# Patient Record
Sex: FEMALE | Race: WHITE | Employment: OTHER | URBAN - METROPOLITAN AREA
[De-identification: names, ages, dates, MRNs, and addresses within clinical notes are randomized per-mention and may not be internally consistent; named-entity substitution may affect disease eponyms.]

---

## 2018-09-16 ENCOUNTER — HOSPITAL ENCOUNTER (OUTPATIENT)
Age: 73
Setting detail: OBSERVATION
Discharge: HOME | End: 2018-09-19
Attending: EMERGENCY MEDICINE | Admitting: GENERAL PRACTICE
Payer: COMMERCIAL

## 2018-09-16 ENCOUNTER — APPOINTMENT (OUTPATIENT)
Dept: GENERAL RADIOLOGY | Age: 73
End: 2018-09-16
Payer: COMMERCIAL

## 2018-09-16 DIAGNOSIS — J18.9 COMMUNITY ACQUIRED PNEUMONIA, UNSPECIFIED LATERALITY: Primary | ICD-10-CM

## 2018-09-16 DIAGNOSIS — R07.9 CHEST PAIN, UNSPECIFIED TYPE: ICD-10-CM

## 2018-09-16 DIAGNOSIS — L03.811 CELLULITIS OF SCALP: ICD-10-CM

## 2018-09-16 LAB
ALBUMIN SERPL-MCNC: 3.6 G/DL (ref 3.5–5.2)
ALP BLD-CCNC: 79 U/L (ref 35–104)
ALT SERPL-CCNC: 14 U/L (ref 0–32)
ANION GAP SERPL CALCULATED.3IONS-SCNC: 12 MMOL/L (ref 7–16)
AST SERPL-CCNC: 25 U/L (ref 0–31)
BASOPHILS ABSOLUTE: 0.1 E9/L (ref 0–0.2)
BASOPHILS RELATIVE PERCENT: 1.1 % (ref 0–2)
BILIRUB SERPL-MCNC: 0.4 MG/DL (ref 0–1.2)
BUN BLDV-MCNC: 10 MG/DL (ref 8–23)
CALCIUM SERPL-MCNC: 8.9 MG/DL (ref 8.6–10.2)
CHLORIDE BLD-SCNC: 105 MMOL/L (ref 98–107)
CO2: 26 MMOL/L (ref 22–29)
CREAT SERPL-MCNC: 0.7 MG/DL (ref 0.5–1)
EKG ATRIAL RATE: 58 BPM
EKG P AXIS: 46 DEGREES
EKG P-R INTERVAL: 146 MS
EKG Q-T INTERVAL: 392 MS
EKG QRS DURATION: 80 MS
EKG QTC CALCULATION (BAZETT): 384 MS
EKG R AXIS: 41 DEGREES
EKG T AXIS: 69 DEGREES
EKG VENTRICULAR RATE: 58 BPM
EOSINOPHILS ABSOLUTE: 0.19 E9/L (ref 0.05–0.5)
EOSINOPHILS RELATIVE PERCENT: 2.2 % (ref 0–6)
GFR AFRICAN AMERICAN: >60
GFR NON-AFRICAN AMERICAN: >60 ML/MIN/1.73
GLUCOSE BLD-MCNC: 97 MG/DL (ref 74–109)
HCT VFR BLD CALC: 45.4 % (ref 34–48)
HEMOGLOBIN: 13.9 G/DL (ref 11.5–15.5)
IMMATURE GRANULOCYTES #: 0.03 E9/L
IMMATURE GRANULOCYTES %: 0.3 % (ref 0–5)
LYMPHOCYTES ABSOLUTE: 1.53 E9/L (ref 1.5–4)
LYMPHOCYTES RELATIVE PERCENT: 17.4 % (ref 20–42)
MCH RBC QN AUTO: 29.8 PG (ref 26–35)
MCHC RBC AUTO-ENTMCNC: 30.6 % (ref 32–34.5)
MCV RBC AUTO: 97.4 FL (ref 80–99.9)
MONOCYTES ABSOLUTE: 0.53 E9/L (ref 0.1–0.95)
MONOCYTES RELATIVE PERCENT: 6 % (ref 2–12)
NEUTROPHILS ABSOLUTE: 6.39 E9/L (ref 1.8–7.3)
NEUTROPHILS RELATIVE PERCENT: 73 % (ref 43–80)
PDW BLD-RTO: 13.1 FL (ref 11.5–15)
PLATELET # BLD: 300 E9/L (ref 130–450)
PMV BLD AUTO: 11.4 FL (ref 7–12)
POTASSIUM SERPL-SCNC: 5.1 MMOL/L (ref 3.5–5)
PRO-BNP: 120 PG/ML (ref 0–125)
RBC # BLD: 4.66 E12/L (ref 3.5–5.5)
SODIUM BLD-SCNC: 143 MMOL/L (ref 132–146)
TOTAL PROTEIN: 6.8 G/DL (ref 6.4–8.3)
TROPONIN: <0.01 NG/ML (ref 0–0.03)
WBC # BLD: 8.8 E9/L (ref 4.5–11.5)

## 2018-09-16 PROCEDURE — 6370000000 HC RX 637 (ALT 250 FOR IP): Performed by: EMERGENCY MEDICINE

## 2018-09-16 PROCEDURE — G0378 HOSPITAL OBSERVATION PER HR: HCPCS

## 2018-09-16 PROCEDURE — 96375 TX/PRO/DX INJ NEW DRUG ADDON: CPT

## 2018-09-16 PROCEDURE — 6360000002 HC RX W HCPCS: Performed by: EMERGENCY MEDICINE

## 2018-09-16 PROCEDURE — 99285 EMERGENCY DEPT VISIT HI MDM: CPT

## 2018-09-16 PROCEDURE — 85025 COMPLETE CBC W/AUTO DIFF WBC: CPT

## 2018-09-16 PROCEDURE — 93005 ELECTROCARDIOGRAM TRACING: CPT | Performed by: EMERGENCY MEDICINE

## 2018-09-16 PROCEDURE — 80053 COMPREHEN METABOLIC PANEL: CPT

## 2018-09-16 PROCEDURE — 2580000003 HC RX 258: Performed by: EMERGENCY MEDICINE

## 2018-09-16 PROCEDURE — 83880 ASSAY OF NATRIURETIC PEPTIDE: CPT

## 2018-09-16 PROCEDURE — 87040 BLOOD CULTURE FOR BACTERIA: CPT

## 2018-09-16 PROCEDURE — 96365 THER/PROPH/DIAG IV INF INIT: CPT

## 2018-09-16 PROCEDURE — 71045 X-RAY EXAM CHEST 1 VIEW: CPT

## 2018-09-16 PROCEDURE — 84484 ASSAY OF TROPONIN QUANT: CPT

## 2018-09-16 PROCEDURE — 36415 COLL VENOUS BLD VENIPUNCTURE: CPT

## 2018-09-16 RX ORDER — DOXYCYCLINE HYCLATE 100 MG/1
100 CAPSULE ORAL ONCE
Status: COMPLETED | OUTPATIENT
Start: 2018-09-16 | End: 2018-09-16

## 2018-09-16 RX ORDER — MORPHINE SULFATE 4 MG/ML
4 INJECTION, SOLUTION INTRAMUSCULAR; INTRAVENOUS ONCE
Status: COMPLETED | OUTPATIENT
Start: 2018-09-16 | End: 2018-09-16

## 2018-09-16 RX ORDER — LISINOPRIL 20 MG/1
20 TABLET ORAL DAILY
Status: DISCONTINUED | OUTPATIENT
Start: 2018-09-17 | End: 2018-09-19 | Stop reason: HOSPADM

## 2018-09-16 RX ORDER — ALBUTEROL SULFATE 90 UG/1
2 AEROSOL, METERED RESPIRATORY (INHALATION) EVERY 4 HOURS PRN
Status: DISCONTINUED | OUTPATIENT
Start: 2018-09-16 | End: 2018-09-19 | Stop reason: HOSPADM

## 2018-09-16 RX ORDER — ASPIRIN 81 MG/1
324 TABLET, CHEWABLE ORAL ONCE
Status: COMPLETED | OUTPATIENT
Start: 2018-09-16 | End: 2018-09-16

## 2018-09-16 RX ORDER — FORMOTEROL FUMARATE 20 UG/2ML
20 SOLUTION RESPIRATORY (INHALATION) 2 TIMES DAILY
Status: DISCONTINUED | OUTPATIENT
Start: 2018-09-16 | End: 2018-09-19 | Stop reason: HOSPADM

## 2018-09-16 RX ORDER — TRAMADOL HYDROCHLORIDE 50 MG/1
50 TABLET ORAL EVERY 8 HOURS PRN
Status: DISCONTINUED | OUTPATIENT
Start: 2018-09-16 | End: 2018-09-19 | Stop reason: HOSPADM

## 2018-09-16 RX ORDER — DOXEPIN HYDROCHLORIDE 50 MG/1
100 CAPSULE ORAL NIGHTLY
Status: DISCONTINUED | OUTPATIENT
Start: 2018-09-16 | End: 2018-09-19 | Stop reason: HOSPADM

## 2018-09-16 RX ORDER — ONDANSETRON 2 MG/ML
4 INJECTION INTRAMUSCULAR; INTRAVENOUS ONCE
Status: COMPLETED | OUTPATIENT
Start: 2018-09-16 | End: 2018-09-16

## 2018-09-16 RX ORDER — METOPROLOL TARTRATE 50 MG/1
50 TABLET, FILM COATED ORAL 2 TIMES DAILY
Status: DISCONTINUED | OUTPATIENT
Start: 2018-09-16 | End: 2018-09-17

## 2018-09-16 RX ORDER — FUROSEMIDE 20 MG/1
20 TABLET ORAL DAILY
Status: DISCONTINUED | OUTPATIENT
Start: 2018-09-17 | End: 2018-09-19 | Stop reason: HOSPADM

## 2018-09-16 RX ORDER — PAROXETINE 10 MG/1
10 TABLET, FILM COATED ORAL EVERY EVENING
Status: DISCONTINUED | OUTPATIENT
Start: 2018-09-16 | End: 2018-09-19 | Stop reason: HOSPADM

## 2018-09-16 RX ORDER — PANTOPRAZOLE SODIUM 40 MG/1
40 TABLET, DELAYED RELEASE ORAL
Status: DISCONTINUED | OUTPATIENT
Start: 2018-09-17 | End: 2018-09-19 | Stop reason: HOSPADM

## 2018-09-16 RX ORDER — BUDESONIDE 0.5 MG/2ML
1000 INHALANT ORAL 2 TIMES DAILY
Status: DISCONTINUED | OUTPATIENT
Start: 2018-09-16 | End: 2018-09-19 | Stop reason: HOSPADM

## 2018-09-16 RX ORDER — CLONAZEPAM 0.5 MG/1
1 TABLET ORAL 2 TIMES DAILY
Status: DISCONTINUED | OUTPATIENT
Start: 2018-09-17 | End: 2018-09-19 | Stop reason: HOSPADM

## 2018-09-16 RX ORDER — SIMVASTATIN 40 MG
40 TABLET ORAL NIGHTLY
Status: DISCONTINUED | OUTPATIENT
Start: 2018-09-16 | End: 2018-09-19 | Stop reason: HOSPADM

## 2018-09-16 RX ORDER — ASPIRIN 81 MG/1
81 TABLET, CHEWABLE ORAL DAILY
Status: DISCONTINUED | OUTPATIENT
Start: 2018-09-17 | End: 2018-09-19 | Stop reason: HOSPADM

## 2018-09-16 RX ORDER — FENTANYL CITRATE 50 UG/ML
50 INJECTION, SOLUTION INTRAMUSCULAR; INTRAVENOUS ONCE
Status: COMPLETED | OUTPATIENT
Start: 2018-09-16 | End: 2018-09-16

## 2018-09-16 RX ADMIN — MORPHINE SULFATE 4 MG: 4 INJECTION INTRAVENOUS at 19:33

## 2018-09-16 RX ADMIN — ONDANSETRON 4 MG: 2 SOLUTION INTRAMUSCULAR; INTRAVENOUS at 19:34

## 2018-09-16 RX ADMIN — ASPIRIN 81 MG 324 MG: 81 TABLET ORAL at 15:17

## 2018-09-16 RX ADMIN — CEFTRIAXONE SODIUM 1 G: 1 INJECTION, POWDER, FOR SOLUTION INTRAMUSCULAR; INTRAVENOUS at 19:39

## 2018-09-16 RX ADMIN — DOXYCYCLINE HYCLATE 100 MG: 100 CAPSULE ORAL at 19:32

## 2018-09-16 RX ADMIN — FENTANYL CITRATE 50 MCG: 50 INJECTION, SOLUTION INTRAMUSCULAR; INTRAVENOUS at 16:14

## 2018-09-16 ASSESSMENT — PAIN DESCRIPTION - PROGRESSION
CLINICAL_PROGRESSION: GRADUALLY IMPROVING
CLINICAL_PROGRESSION: NOT CHANGED
CLINICAL_PROGRESSION: GRADUALLY WORSENING

## 2018-09-16 ASSESSMENT — PAIN SCALES - GENERAL
PAINLEVEL_OUTOF10: 6
PAINLEVEL_OUTOF10: 3
PAINLEVEL_OUTOF10: 3
PAINLEVEL_OUTOF10: 8
PAINLEVEL_OUTOF10: 3
PAINLEVEL_OUTOF10: 7
PAINLEVEL_OUTOF10: 8

## 2018-09-16 ASSESSMENT — ENCOUNTER SYMPTOMS
RHINORRHEA: 0
ABDOMINAL PAIN: 0
DIARRHEA: 0
WHEEZING: 0
STRIDOR: 0
BACK PAIN: 0
NAUSEA: 0
SORE THROAT: 0
COUGH: 1
SHORTNESS OF BREATH: 1
VOMITING: 0

## 2018-09-16 ASSESSMENT — PAIN DESCRIPTION - PAIN TYPE
TYPE: ACUTE PAIN

## 2018-09-16 ASSESSMENT — PAIN DESCRIPTION - LOCATION
LOCATION: CHEST;RIB CAGE
LOCATION: CHEST
LOCATION: CHEST;RIB CAGE;STERNUM

## 2018-09-16 ASSESSMENT — PAIN DESCRIPTION - ORIENTATION
ORIENTATION: MID;LEFT

## 2018-09-16 ASSESSMENT — PAIN DESCRIPTION - DESCRIPTORS
DESCRIPTORS: ACHING;DISCOMFORT
DESCRIPTORS: BURNING;SORE;SHARP

## 2018-09-16 ASSESSMENT — PAIN DESCRIPTION - FREQUENCY
FREQUENCY: INTERMITTENT
FREQUENCY: INTERMITTENT
FREQUENCY: CONTINUOUS

## 2018-09-16 ASSESSMENT — PAIN DESCRIPTION - ONSET: ONSET: ON-GOING

## 2018-09-17 LAB
ANION GAP SERPL CALCULATED.3IONS-SCNC: 14 MMOL/L (ref 7–16)
BUN BLDV-MCNC: 10 MG/DL (ref 8–23)
CALCIUM SERPL-MCNC: 8.3 MG/DL (ref 8.6–10.2)
CHLORIDE BLD-SCNC: 108 MMOL/L (ref 98–107)
CHOLESTEROL, TOTAL: 145 MG/DL (ref 0–199)
CO2: 23 MMOL/L (ref 22–29)
CREAT SERPL-MCNC: 0.8 MG/DL (ref 0.5–1)
FILM ARRAY ADENOVIRUS: NORMAL
FILM ARRAY BORDETELLA PERTUSSIS: NORMAL
FILM ARRAY CHLAMYDOPHILIA PNEUMONIAE: NORMAL
FILM ARRAY CORONAVIRUS 229E: NORMAL
FILM ARRAY CORONAVIRUS HKU1: NORMAL
FILM ARRAY CORONAVIRUS NL63: NORMAL
FILM ARRAY CORONAVIRUS OC43: NORMAL
FILM ARRAY INFLUENZA A VIRUS 09H1: NORMAL
FILM ARRAY INFLUENZA A VIRUS H1: NORMAL
FILM ARRAY INFLUENZA A VIRUS H3: NORMAL
FILM ARRAY INFLUENZA A VIRUS: NORMAL
FILM ARRAY INFLUENZA B: NORMAL
FILM ARRAY METAPNEUMOVIRUS: NORMAL
FILM ARRAY MYCOPLASMA PNEUMONIAE: NORMAL
FILM ARRAY PARAINFLUENZA VIRUS 1: NORMAL
FILM ARRAY PARAINFLUENZA VIRUS 2: NORMAL
FILM ARRAY PARAINFLUENZA VIRUS 3: NORMAL
FILM ARRAY PARAINFLUENZA VIRUS 4: NORMAL
FILM ARRAY RESPIRATORY SYNCITIAL VIRUS: NORMAL
FILM ARRAY RHINOVIRUS/ENTEROVIRUS: NORMAL
GFR AFRICAN AMERICAN: >60
GFR NON-AFRICAN AMERICAN: >60 ML/MIN/1.73
GLUCOSE BLD-MCNC: 112 MG/DL (ref 74–109)
HDLC SERPL-MCNC: 32 MG/DL
LDL CHOLESTEROL CALCULATED: 79 MG/DL (ref 0–99)
POTASSIUM SERPL-SCNC: 3.9 MMOL/L (ref 3.5–5)
PROCALCITONIN: 0.03 NG/ML (ref 0–0.08)
SODIUM BLD-SCNC: 145 MMOL/L (ref 132–146)
TRIGL SERPL-MCNC: 172 MG/DL (ref 0–149)
VLDLC SERPL CALC-MCNC: 34 MG/DL

## 2018-09-17 PROCEDURE — 99214 OFFICE O/P EST MOD 30 MIN: CPT | Performed by: INTERNAL MEDICINE

## 2018-09-17 PROCEDURE — 87581 M.PNEUMON DNA AMP PROBE: CPT

## 2018-09-17 PROCEDURE — 6360000002 HC RX W HCPCS: Performed by: INTERNAL MEDICINE

## 2018-09-17 PROCEDURE — 6360000002 HC RX W HCPCS: Performed by: GENERAL PRACTICE

## 2018-09-17 PROCEDURE — 84145 PROCALCITONIN (PCT): CPT

## 2018-09-17 PROCEDURE — 80048 BASIC METABOLIC PNL TOTAL CA: CPT

## 2018-09-17 PROCEDURE — 87486 CHLMYD PNEUM DNA AMP PROBE: CPT

## 2018-09-17 PROCEDURE — 96376 TX/PRO/DX INJ SAME DRUG ADON: CPT

## 2018-09-17 PROCEDURE — 96372 THER/PROPH/DIAG INJ SC/IM: CPT

## 2018-09-17 PROCEDURE — 36415 COLL VENOUS BLD VENIPUNCTURE: CPT

## 2018-09-17 PROCEDURE — 87633 RESP VIRUS 12-25 TARGETS: CPT

## 2018-09-17 PROCEDURE — 94640 AIRWAY INHALATION TREATMENT: CPT

## 2018-09-17 PROCEDURE — G0378 HOSPITAL OBSERVATION PER HR: HCPCS

## 2018-09-17 PROCEDURE — 80061 LIPID PANEL: CPT

## 2018-09-17 PROCEDURE — 96375 TX/PRO/DX INJ NEW DRUG ADDON: CPT

## 2018-09-17 PROCEDURE — 87798 DETECT AGENT NOS DNA AMP: CPT

## 2018-09-17 PROCEDURE — 6370000000 HC RX 637 (ALT 250 FOR IP): Performed by: GENERAL PRACTICE

## 2018-09-17 RX ORDER — LEVOFLOXACIN 500 MG/1
250 TABLET, FILM COATED ORAL DAILY
Status: DISCONTINUED | OUTPATIENT
Start: 2018-09-17 | End: 2018-09-19 | Stop reason: HOSPADM

## 2018-09-17 RX ORDER — METHYLPREDNISOLONE SODIUM SUCCINATE 40 MG/ML
40 INJECTION, POWDER, LYOPHILIZED, FOR SOLUTION INTRAMUSCULAR; INTRAVENOUS EVERY 8 HOURS
Status: COMPLETED | OUTPATIENT
Start: 2018-09-17 | End: 2018-09-17

## 2018-09-17 RX ADMIN — LEVOFLOXACIN 250 MG: 500 TABLET, FILM COATED ORAL at 13:55

## 2018-09-17 RX ADMIN — SIMVASTATIN 40 MG: 40 TABLET, FILM COATED ORAL at 00:38

## 2018-09-17 RX ADMIN — FORMOTEROL FUMARATE DIHYDRATE 20 MCG: 20 SOLUTION RESPIRATORY (INHALATION) at 09:00

## 2018-09-17 RX ADMIN — ASPIRIN 81 MG 81 MG: 81 TABLET ORAL at 08:28

## 2018-09-17 RX ADMIN — FORMOTEROL FUMARATE DIHYDRATE 20 MCG: 20 SOLUTION RESPIRATORY (INHALATION) at 19:53

## 2018-09-17 RX ADMIN — METOPROLOL TARTRATE 50 MG: 50 TABLET ORAL at 00:38

## 2018-09-17 RX ADMIN — PAROXETINE HYDROCHLORIDE HEMIHYDRATE 10 MG: 10 TABLET, FILM COATED ORAL at 00:38

## 2018-09-17 RX ADMIN — ENOXAPARIN SODIUM 80 MG: 80 INJECTION SUBCUTANEOUS at 08:31

## 2018-09-17 RX ADMIN — PANTOPRAZOLE SODIUM 40 MG: 40 TABLET, DELAYED RELEASE ORAL at 20:33

## 2018-09-17 RX ADMIN — FUROSEMIDE 20 MG: 20 TABLET ORAL at 08:28

## 2018-09-17 RX ADMIN — METOPROLOL TARTRATE 25 MG: 25 TABLET, FILM COATED ORAL at 20:33

## 2018-09-17 RX ADMIN — METHYLPREDNISOLONE SODIUM SUCCINATE 40 MG: 40 INJECTION, POWDER, FOR SOLUTION INTRAMUSCULAR; INTRAVENOUS at 20:33

## 2018-09-17 RX ADMIN — DOXEPIN HYDROCHLORIDE 100 MG: 50 CAPSULE ORAL at 20:33

## 2018-09-17 RX ADMIN — CLONAZEPAM 1 MG: 0.5 TABLET ORAL at 08:28

## 2018-09-17 RX ADMIN — PAROXETINE HYDROCHLORIDE HEMIHYDRATE 10 MG: 10 TABLET, FILM COATED ORAL at 18:26

## 2018-09-17 RX ADMIN — METHYLPREDNISOLONE SODIUM SUCCINATE 40 MG: 40 INJECTION, POWDER, FOR SOLUTION INTRAMUSCULAR; INTRAVENOUS at 13:55

## 2018-09-17 RX ADMIN — DOXEPIN HYDROCHLORIDE 100 MG: 50 CAPSULE ORAL at 00:39

## 2018-09-17 RX ADMIN — TRAMADOL HYDROCHLORIDE 50 MG: 50 TABLET, FILM COATED ORAL at 23:34

## 2018-09-17 RX ADMIN — SIMVASTATIN 40 MG: 40 TABLET, FILM COATED ORAL at 20:33

## 2018-09-17 RX ADMIN — BUDESONIDE 1000 MCG: 0.5 SUSPENSION RESPIRATORY (INHALATION) at 09:03

## 2018-09-17 RX ADMIN — CLONAZEPAM 1 MG: 0.5 TABLET ORAL at 16:36

## 2018-09-17 RX ADMIN — BUDESONIDE 1000 MCG: 0.5 SUSPENSION RESPIRATORY (INHALATION) at 19:52

## 2018-09-17 ASSESSMENT — PAIN DESCRIPTION - LOCATION: LOCATION: CHEST;RIB CAGE;BACK

## 2018-09-17 ASSESSMENT — PAIN DESCRIPTION - DESCRIPTORS: DESCRIPTORS: ACHING;DISCOMFORT;SORE

## 2018-09-17 ASSESSMENT — PAIN DESCRIPTION - PAIN TYPE: TYPE: ACUTE PAIN

## 2018-09-17 ASSESSMENT — PAIN DESCRIPTION - ORIENTATION: ORIENTATION: MID;LEFT

## 2018-09-17 ASSESSMENT — PAIN SCALES - GENERAL
PAINLEVEL_OUTOF10: 0
PAINLEVEL_OUTOF10: 2
PAINLEVEL_OUTOF10: 5

## 2018-09-17 ASSESSMENT — PAIN DESCRIPTION - FREQUENCY: FREQUENCY: INTERMITTENT

## 2018-09-18 ENCOUNTER — APPOINTMENT (OUTPATIENT)
Dept: GENERAL RADIOLOGY | Age: 73
End: 2018-09-18
Payer: COMMERCIAL

## 2018-09-18 PROCEDURE — G0378 HOSPITAL OBSERVATION PER HR: HCPCS

## 2018-09-18 PROCEDURE — 96376 TX/PRO/DX INJ SAME DRUG ADON: CPT

## 2018-09-18 PROCEDURE — 6370000000 HC RX 637 (ALT 250 FOR IP): Performed by: GENERAL PRACTICE

## 2018-09-18 PROCEDURE — 6360000002 HC RX W HCPCS: Performed by: GENERAL PRACTICE

## 2018-09-18 PROCEDURE — 96372 THER/PROPH/DIAG INJ SC/IM: CPT

## 2018-09-18 PROCEDURE — 94640 AIRWAY INHALATION TREATMENT: CPT

## 2018-09-18 PROCEDURE — 96375 TX/PRO/DX INJ NEW DRUG ADDON: CPT

## 2018-09-18 PROCEDURE — 71045 X-RAY EXAM CHEST 1 VIEW: CPT

## 2018-09-18 PROCEDURE — 6360000002 HC RX W HCPCS: Performed by: INTERNAL MEDICINE

## 2018-09-18 RX ORDER — METHYLPREDNISOLONE SODIUM SUCCINATE 40 MG/ML
40 INJECTION, POWDER, LYOPHILIZED, FOR SOLUTION INTRAMUSCULAR; INTRAVENOUS ONCE
Status: COMPLETED | OUTPATIENT
Start: 2018-09-18 | End: 2018-09-18

## 2018-09-18 RX ORDER — MECLIZINE HCL 12.5 MG/1
25 TABLET ORAL ONCE
Status: COMPLETED | OUTPATIENT
Start: 2018-09-18 | End: 2018-09-18

## 2018-09-18 RX ORDER — PREDNISONE 10 MG/1
30 TABLET ORAL DAILY
Status: DISCONTINUED | OUTPATIENT
Start: 2018-09-19 | End: 2018-09-19 | Stop reason: HOSPADM

## 2018-09-18 RX ADMIN — ASPIRIN 81 MG 81 MG: 81 TABLET ORAL at 08:52

## 2018-09-18 RX ADMIN — CLONAZEPAM 1 MG: 0.5 TABLET ORAL at 08:52

## 2018-09-18 RX ADMIN — LISINOPRIL 20 MG: 20 TABLET ORAL at 08:52

## 2018-09-18 RX ADMIN — SIMVASTATIN 40 MG: 40 TABLET, FILM COATED ORAL at 21:47

## 2018-09-18 RX ADMIN — ENOXAPARIN SODIUM 80 MG: 80 INJECTION SUBCUTANEOUS at 08:52

## 2018-09-18 RX ADMIN — DOXEPIN HYDROCHLORIDE 100 MG: 50 CAPSULE ORAL at 21:47

## 2018-09-18 RX ADMIN — METOPROLOL TARTRATE 25 MG: 25 TABLET, FILM COATED ORAL at 21:47

## 2018-09-18 RX ADMIN — PAROXETINE HYDROCHLORIDE HEMIHYDRATE 10 MG: 10 TABLET, FILM COATED ORAL at 19:09

## 2018-09-18 RX ADMIN — BUDESONIDE 1000 MCG: 0.5 SUSPENSION RESPIRATORY (INHALATION) at 08:23

## 2018-09-18 RX ADMIN — FORMOTEROL FUMARATE DIHYDRATE 20 MCG: 20 SOLUTION RESPIRATORY (INHALATION) at 08:23

## 2018-09-18 RX ADMIN — FUROSEMIDE 20 MG: 20 TABLET ORAL at 08:52

## 2018-09-18 RX ADMIN — PANTOPRAZOLE SODIUM 40 MG: 40 TABLET, DELAYED RELEASE ORAL at 06:33

## 2018-09-18 RX ADMIN — MECLIZINE 25 MG: 12.5 TABLET ORAL at 14:16

## 2018-09-18 RX ADMIN — METOPROLOL TARTRATE 25 MG: 25 TABLET, FILM COATED ORAL at 08:51

## 2018-09-18 RX ADMIN — METHYLPREDNISOLONE SODIUM SUCCINATE 40 MG: 40 INJECTION, POWDER, FOR SOLUTION INTRAMUSCULAR; INTRAVENOUS at 19:09

## 2018-09-18 RX ADMIN — FORMOTEROL FUMARATE DIHYDRATE 20 MCG: 20 SOLUTION RESPIRATORY (INHALATION) at 20:04

## 2018-09-18 RX ADMIN — BUDESONIDE 1000 MCG: 0.5 SUSPENSION RESPIRATORY (INHALATION) at 20:05

## 2018-09-18 RX ADMIN — LEVOFLOXACIN 250 MG: 500 TABLET, FILM COATED ORAL at 08:52

## 2018-09-18 ASSESSMENT — PAIN SCALES - GENERAL
PAINLEVEL_OUTOF10: 0
PAINLEVEL_OUTOF10: 0

## 2018-09-19 VITALS
SYSTOLIC BLOOD PRESSURE: 127 MMHG | DIASTOLIC BLOOD PRESSURE: 45 MMHG | WEIGHT: 230.2 LBS | HEART RATE: 64 BPM | TEMPERATURE: 98.2 F | BODY MASS INDEX: 39.3 KG/M2 | HEIGHT: 64 IN | OXYGEN SATURATION: 96 % | RESPIRATION RATE: 16 BRPM

## 2018-09-19 PROCEDURE — 6370000000 HC RX 637 (ALT 250 FOR IP): Performed by: INTERNAL MEDICINE

## 2018-09-19 PROCEDURE — 6360000002 HC RX W HCPCS: Performed by: GENERAL PRACTICE

## 2018-09-19 PROCEDURE — G0378 HOSPITAL OBSERVATION PER HR: HCPCS

## 2018-09-19 PROCEDURE — 96372 THER/PROPH/DIAG INJ SC/IM: CPT

## 2018-09-19 PROCEDURE — 6370000000 HC RX 637 (ALT 250 FOR IP): Performed by: GENERAL PRACTICE

## 2018-09-19 PROCEDURE — 94640 AIRWAY INHALATION TREATMENT: CPT

## 2018-09-19 RX ORDER — PREDNISONE 10 MG/1
TABLET ORAL
Qty: 18 TABLET | Refills: 0 | Status: ON HOLD | OUTPATIENT
Start: 2018-09-19 | End: 2022-05-19 | Stop reason: HOSPADM

## 2018-09-19 RX ORDER — LEVOFLOXACIN 250 MG/1
250 TABLET ORAL DAILY
Qty: 10 TABLET | Refills: 0 | Status: SHIPPED | OUTPATIENT
Start: 2018-09-19 | End: 2018-09-29

## 2018-09-19 RX ORDER — DOXEPIN HYDROCHLORIDE 100 MG/1
100 CAPSULE ORAL NIGHTLY
Qty: 30 CAPSULE | Refills: 3 | Status: SHIPPED | OUTPATIENT
Start: 2018-09-19

## 2018-09-19 RX ORDER — PREDNISONE 10 MG/1
30 TABLET ORAL DAILY
Qty: 30 TABLET | Refills: 0 | Status: SHIPPED | OUTPATIENT
Start: 2018-09-19 | End: 2018-09-19

## 2018-09-19 RX ADMIN — ASPIRIN 81 MG 81 MG: 81 TABLET ORAL at 09:25

## 2018-09-19 RX ADMIN — PANTOPRAZOLE SODIUM 40 MG: 40 TABLET, DELAYED RELEASE ORAL at 06:30

## 2018-09-19 RX ADMIN — BUDESONIDE 1000 MCG: 0.5 SUSPENSION RESPIRATORY (INHALATION) at 12:21

## 2018-09-19 RX ADMIN — FUROSEMIDE 20 MG: 20 TABLET ORAL at 09:25

## 2018-09-19 RX ADMIN — CLONAZEPAM 1 MG: 0.5 TABLET ORAL at 09:24

## 2018-09-19 RX ADMIN — PAROXETINE HYDROCHLORIDE HEMIHYDRATE 10 MG: 10 TABLET, FILM COATED ORAL at 18:19

## 2018-09-19 RX ADMIN — LISINOPRIL 20 MG: 20 TABLET ORAL at 09:25

## 2018-09-19 RX ADMIN — METOPROLOL TARTRATE 25 MG: 25 TABLET, FILM COATED ORAL at 09:24

## 2018-09-19 RX ADMIN — PREDNISONE 30 MG: 10 TABLET ORAL at 09:25

## 2018-09-19 RX ADMIN — LEVOFLOXACIN 250 MG: 500 TABLET, FILM COATED ORAL at 09:25

## 2018-09-19 RX ADMIN — ENOXAPARIN SODIUM 80 MG: 80 INJECTION SUBCUTANEOUS at 09:24

## 2018-09-19 RX ADMIN — CLONAZEPAM 1 MG: 0.5 TABLET ORAL at 18:19

## 2018-09-19 RX ADMIN — FORMOTEROL FUMARATE DIHYDRATE 20 MCG: 20 SOLUTION RESPIRATORY (INHALATION) at 12:20

## 2018-09-19 ASSESSMENT — PAIN SCALES - GENERAL
PAINLEVEL_OUTOF10: 0
PAINLEVEL_OUTOF10: 0

## 2018-09-21 LAB
BLOOD CULTURE, ROUTINE: NORMAL
CULTURE, BLOOD 2: NORMAL

## 2018-09-25 ENCOUNTER — TELEPHONE (OUTPATIENT)
Dept: CARDIOLOGY CLINIC | Age: 73
End: 2018-09-25

## 2022-05-13 ENCOUNTER — APPOINTMENT (OUTPATIENT)
Dept: GENERAL RADIOLOGY | Age: 77
DRG: 137 | End: 2022-05-13
Payer: COMMERCIAL

## 2022-05-13 ENCOUNTER — APPOINTMENT (OUTPATIENT)
Dept: CT IMAGING | Age: 77
DRG: 137 | End: 2022-05-13
Payer: COMMERCIAL

## 2022-05-13 ENCOUNTER — HOSPITAL ENCOUNTER (INPATIENT)
Age: 77
LOS: 6 days | Discharge: HOME HEALTH | DRG: 137 | End: 2022-05-19
Attending: EMERGENCY MEDICINE | Admitting: GENERAL PRACTICE
Payer: COMMERCIAL

## 2022-05-13 DIAGNOSIS — U07.1 COVID-19: Primary | ICD-10-CM

## 2022-05-13 DIAGNOSIS — R26.2 UNABLE TO AMBULATE: ICD-10-CM

## 2022-05-13 DIAGNOSIS — R11.2 NON-INTRACTABLE VOMITING WITH NAUSEA, UNSPECIFIED VOMITING TYPE: ICD-10-CM

## 2022-05-13 PROBLEM — J12.82 PNEUMONIA DUE TO COVID-19 VIRUS: Status: ACTIVE | Noted: 2022-05-13

## 2022-05-13 LAB
ADENOVIRUS BY PCR: NOT DETECTED
ALBUMIN SERPL-MCNC: 3.5 G/DL (ref 3.5–5.2)
ALP BLD-CCNC: 63 U/L (ref 35–104)
ALT SERPL-CCNC: 9 U/L (ref 0–32)
ANION GAP SERPL CALCULATED.3IONS-SCNC: 16 MMOL/L (ref 7–16)
APTT: 32 SEC (ref 24.5–35.1)
AST SERPL-CCNC: 22 U/L (ref 0–31)
ATYPICAL LYMPHOCYTE RELATIVE PERCENT: 3.5 % (ref 0–4)
BASOPHILS ABSOLUTE: 0 E9/L (ref 0–0.2)
BASOPHILS RELATIVE PERCENT: 0 % (ref 0–2)
BILIRUB SERPL-MCNC: 0.9 MG/DL (ref 0–1.2)
BORDETELLA PARAPERTUSSIS BY PCR: NOT DETECTED
BORDETELLA PERTUSSIS BY PCR: NOT DETECTED
BUN BLDV-MCNC: 12 MG/DL (ref 6–23)
C-REACTIVE PROTEIN: 2.9 MG/DL (ref 0–0.4)
CALCIUM SERPL-MCNC: 8.5 MG/DL (ref 8.6–10.2)
CHLAMYDOPHILIA PNEUMONIAE BY PCR: NOT DETECTED
CHLORIDE BLD-SCNC: 96 MMOL/L (ref 98–107)
CO2: 24 MMOL/L (ref 22–29)
CORONAVIRUS 229E BY PCR: NOT DETECTED
CORONAVIRUS HKU1 BY PCR: NOT DETECTED
CORONAVIRUS NL63 BY PCR: NOT DETECTED
CORONAVIRUS OC43 BY PCR: NOT DETECTED
CREAT SERPL-MCNC: 0.8 MG/DL (ref 0.5–1)
D DIMER: 417 NG/ML DDU
EKG ATRIAL RATE: 104 BPM
EKG P AXIS: 47 DEGREES
EKG P-R INTERVAL: 138 MS
EKG Q-T INTERVAL: 408 MS
EKG QRS DURATION: 76 MS
EKG QTC CALCULATION (BAZETT): 536 MS
EKG R AXIS: 31 DEGREES
EKG T AXIS: 42 DEGREES
EKG VENTRICULAR RATE: 104 BPM
EOSINOPHILS ABSOLUTE: 0 E9/L (ref 0.05–0.5)
EOSINOPHILS RELATIVE PERCENT: 0 % (ref 0–6)
GFR AFRICAN AMERICAN: >60
GFR NON-AFRICAN AMERICAN: >60 ML/MIN/1.73
GLUCOSE BLD-MCNC: 108 MG/DL (ref 74–99)
HCT VFR BLD CALC: 40.9 % (ref 34–48)
HEMOGLOBIN: 13.6 G/DL (ref 11.5–15.5)
HUMAN METAPNEUMOVIRUS BY PCR: NOT DETECTED
HUMAN RHINOVIRUS/ENTEROVIRUS BY PCR: NOT DETECTED
INFLUENZA A BY PCR: NOT DETECTED
INFLUENZA B BY PCR: NOT DETECTED
INR BLD: 1.2
LACTIC ACID: 1.1 MMOL/L (ref 0.5–2.2)
LYMPHOCYTES ABSOLUTE: 0.34 E9/L (ref 1.5–4)
LYMPHOCYTES RELATIVE PERCENT: 4.3 % (ref 20–42)
MAGNESIUM: 2 MG/DL (ref 1.6–2.6)
MCH RBC QN AUTO: 30.3 PG (ref 26–35)
MCHC RBC AUTO-ENTMCNC: 33.3 % (ref 32–34.5)
MCV RBC AUTO: 91.1 FL (ref 80–99.9)
METAMYELOCYTES RELATIVE PERCENT: 0.9 % (ref 0–1)
MONOCYTES ABSOLUTE: 0.39 E9/L (ref 0.1–0.95)
MONOCYTES RELATIVE PERCENT: 8.7 % (ref 2–12)
MYCOPLASMA PNEUMONIAE BY PCR: NOT DETECTED
NEUTROPHILS ABSOLUTE: 3.61 E9/L (ref 1.8–7.3)
NEUTROPHILS RELATIVE PERCENT: 82.6 % (ref 43–80)
NUCLEATED RED BLOOD CELLS: 0 /100 WBC
PARAINFLUENZA VIRUS 1 BY PCR: NOT DETECTED
PARAINFLUENZA VIRUS 2 BY PCR: NOT DETECTED
PARAINFLUENZA VIRUS 3 BY PCR: NOT DETECTED
PARAINFLUENZA VIRUS 4 BY PCR: NOT DETECTED
PDW BLD-RTO: 12.4 FL (ref 11.5–15)
PLATELET # BLD: 220 E9/L (ref 130–450)
PMV BLD AUTO: 10.4 FL (ref 7–12)
POTASSIUM REFLEX MAGNESIUM: 3.3 MMOL/L (ref 3.5–5)
PRO-BNP: 435 PG/ML (ref 0–450)
PROTHROMBIN TIME: 13.2 SEC (ref 9.3–12.4)
RBC # BLD: 4.49 E12/L (ref 3.5–5.5)
RBC # BLD: NORMAL 10*6/UL
RESPIRATORY SYNCYTIAL VIRUS BY PCR: NOT DETECTED
SARS-COV-2, PCR: DETECTED
SEDIMENTATION RATE, ERYTHROCYTE: 55 MM/HR (ref 0–20)
SODIUM BLD-SCNC: 136 MMOL/L (ref 132–146)
TOTAL PROTEIN: 6.5 G/DL (ref 6.4–8.3)
TROPONIN, HIGH SENSITIVITY: 23 NG/L (ref 0–9)
WBC # BLD: 4.3 E9/L (ref 4.5–11.5)

## 2022-05-13 PROCEDURE — 71045 X-RAY EXAM CHEST 1 VIEW: CPT

## 2022-05-13 PROCEDURE — 83605 ASSAY OF LACTIC ACID: CPT

## 2022-05-13 PROCEDURE — 6360000002 HC RX W HCPCS: Performed by: GENERAL PRACTICE

## 2022-05-13 PROCEDURE — 85610 PROTHROMBIN TIME: CPT

## 2022-05-13 PROCEDURE — 85651 RBC SED RATE NONAUTOMATED: CPT

## 2022-05-13 PROCEDURE — 96375 TX/PRO/DX INJ NEW DRUG ADDON: CPT

## 2022-05-13 PROCEDURE — 99285 EMERGENCY DEPT VISIT HI MDM: CPT

## 2022-05-13 PROCEDURE — 83880 ASSAY OF NATRIURETIC PEPTIDE: CPT

## 2022-05-13 PROCEDURE — 1200000000 HC SEMI PRIVATE

## 2022-05-13 PROCEDURE — 94664 DEMO&/EVAL PT USE INHALER: CPT

## 2022-05-13 PROCEDURE — 86140 C-REACTIVE PROTEIN: CPT

## 2022-05-13 PROCEDURE — 6360000002 HC RX W HCPCS: Performed by: EMERGENCY MEDICINE

## 2022-05-13 PROCEDURE — 83735 ASSAY OF MAGNESIUM: CPT

## 2022-05-13 PROCEDURE — 93010 ELECTROCARDIOGRAM REPORT: CPT | Performed by: INTERNAL MEDICINE

## 2022-05-13 PROCEDURE — 0202U NFCT DS 22 TRGT SARS-COV-2: CPT

## 2022-05-13 PROCEDURE — 93005 ELECTROCARDIOGRAM TRACING: CPT | Performed by: EMERGENCY MEDICINE

## 2022-05-13 PROCEDURE — 85378 FIBRIN DEGRADE SEMIQUANT: CPT

## 2022-05-13 PROCEDURE — 85025 COMPLETE CBC W/AUTO DIFF WBC: CPT

## 2022-05-13 PROCEDURE — 85730 THROMBOPLASTIN TIME PARTIAL: CPT

## 2022-05-13 PROCEDURE — 2580000003 HC RX 258: Performed by: GENERAL PRACTICE

## 2022-05-13 PROCEDURE — 96374 THER/PROPH/DIAG INJ IV PUSH: CPT

## 2022-05-13 PROCEDURE — 6370000000 HC RX 637 (ALT 250 FOR IP): Performed by: GENERAL PRACTICE

## 2022-05-13 PROCEDURE — 84484 ASSAY OF TROPONIN QUANT: CPT

## 2022-05-13 PROCEDURE — 80053 COMPREHEN METABOLIC PANEL: CPT

## 2022-05-13 PROCEDURE — 2700000000 HC OXYGEN THERAPY PER DAY

## 2022-05-13 PROCEDURE — 6360000004 HC RX CONTRAST MEDICATION: Performed by: RADIOLOGY

## 2022-05-13 PROCEDURE — 71275 CT ANGIOGRAPHY CHEST: CPT

## 2022-05-13 PROCEDURE — 96372 THER/PROPH/DIAG INJ SC/IM: CPT

## 2022-05-13 RX ORDER — OMEPRAZOLE 20 MG/1
40 CAPSULE, DELAYED RELEASE ORAL EVERY EVENING
Status: DISCONTINUED | OUTPATIENT
Start: 2022-05-13 | End: 2022-05-13 | Stop reason: CLARIF

## 2022-05-13 RX ORDER — DOXEPIN HYDROCHLORIDE 50 MG/1
100 CAPSULE ORAL NIGHTLY
Status: DISCONTINUED | OUTPATIENT
Start: 2022-05-13 | End: 2022-05-19 | Stop reason: HOSPADM

## 2022-05-13 RX ORDER — ALBUTEROL SULFATE 90 UG/1
2 AEROSOL, METERED RESPIRATORY (INHALATION) EVERY 4 HOURS PRN
Status: DISCONTINUED | OUTPATIENT
Start: 2022-05-13 | End: 2022-05-19 | Stop reason: HOSPADM

## 2022-05-13 RX ORDER — ATORVASTATIN CALCIUM 20 MG/1
20 TABLET, FILM COATED ORAL NIGHTLY
Status: DISCONTINUED | OUTPATIENT
Start: 2022-05-13 | End: 2022-05-19 | Stop reason: HOSPADM

## 2022-05-13 RX ORDER — ALBUTEROL SULFATE 90 UG/1
4 AEROSOL, METERED RESPIRATORY (INHALATION) ONCE
Status: DISCONTINUED | OUTPATIENT
Start: 2022-05-13 | End: 2022-05-13 | Stop reason: ALTCHOICE

## 2022-05-13 RX ORDER — METOPROLOL TARTRATE 50 MG/1
50 TABLET, FILM COATED ORAL 2 TIMES DAILY
Status: DISCONTINUED | OUTPATIENT
Start: 2022-05-13 | End: 2022-05-19 | Stop reason: HOSPADM

## 2022-05-13 RX ORDER — DEXAMETHASONE SODIUM PHOSPHATE 10 MG/ML
10 INJECTION, SOLUTION INTRAMUSCULAR; INTRAVENOUS DAILY
Status: DISCONTINUED | OUTPATIENT
Start: 2022-05-13 | End: 2022-05-13 | Stop reason: SDUPTHER

## 2022-05-13 RX ORDER — FUROSEMIDE 20 MG/1
20 TABLET ORAL DAILY
Status: DISCONTINUED | OUTPATIENT
Start: 2022-05-13 | End: 2022-05-19 | Stop reason: HOSPADM

## 2022-05-13 RX ORDER — DEXAMETHASONE SODIUM PHOSPHATE 10 MG/ML
10 INJECTION, SOLUTION INTRAMUSCULAR; INTRAVENOUS DAILY
Status: DISCONTINUED | OUTPATIENT
Start: 2022-05-14 | End: 2022-05-15

## 2022-05-13 RX ORDER — DEXAMETHASONE SODIUM PHOSPHATE 10 MG/ML
10 INJECTION INTRAMUSCULAR; INTRAVENOUS ONCE
Status: COMPLETED | OUTPATIENT
Start: 2022-05-13 | End: 2022-05-13

## 2022-05-13 RX ORDER — SIMVASTATIN 40 MG
40 TABLET ORAL NIGHTLY
Status: DISCONTINUED | OUTPATIENT
Start: 2022-05-13 | End: 2022-05-13 | Stop reason: CLARIF

## 2022-05-13 RX ORDER — BUDESONIDE 0.5 MG/2ML
0.5 INHALANT ORAL 2 TIMES DAILY
Status: DISCONTINUED | OUTPATIENT
Start: 2022-05-13 | End: 2022-05-13 | Stop reason: CLARIF

## 2022-05-13 RX ORDER — ASPIRIN 81 MG/1
81 TABLET ORAL DAILY
Status: DISCONTINUED | OUTPATIENT
Start: 2022-05-13 | End: 2022-05-19 | Stop reason: HOSPADM

## 2022-05-13 RX ORDER — ENOXAPARIN SODIUM 100 MG/ML
30 INJECTION SUBCUTANEOUS 2 TIMES DAILY
Status: DISCONTINUED | OUTPATIENT
Start: 2022-05-14 | End: 2022-05-19 | Stop reason: HOSPADM

## 2022-05-13 RX ORDER — ZINC SULFATE 50(220)MG
50 CAPSULE ORAL DAILY
Status: DISCONTINUED | OUTPATIENT
Start: 2022-05-13 | End: 2022-05-19 | Stop reason: HOSPADM

## 2022-05-13 RX ORDER — PANTOPRAZOLE SODIUM 40 MG/1
40 TABLET, DELAYED RELEASE ORAL EVERY EVENING
Status: DISCONTINUED | OUTPATIENT
Start: 2022-05-13 | End: 2022-05-19 | Stop reason: HOSPADM

## 2022-05-13 RX ORDER — ALBUTEROL SULFATE 2.5 MG/3ML
2.5 SOLUTION RESPIRATORY (INHALATION) ONCE
Status: COMPLETED | OUTPATIENT
Start: 2022-05-13 | End: 2022-05-13

## 2022-05-13 RX ORDER — BUDESONIDE AND FORMOTEROL FUMARATE DIHYDRATE 160; 4.5 UG/1; UG/1
2 AEROSOL RESPIRATORY (INHALATION) 2 TIMES DAILY
Status: DISCONTINUED | OUTPATIENT
Start: 2022-05-13 | End: 2022-05-19 | Stop reason: HOSPADM

## 2022-05-13 RX ORDER — ALBUTEROL SULFATE 90 UG/1
2 AEROSOL, METERED RESPIRATORY (INHALATION) EVERY 4 HOURS PRN
Status: DISCONTINUED | OUTPATIENT
Start: 2022-05-13 | End: 2022-05-13 | Stop reason: CLARIF

## 2022-05-13 RX ORDER — ACETAMINOPHEN 325 MG/1
650 TABLET ORAL EVERY 4 HOURS PRN
Status: DISCONTINUED | OUTPATIENT
Start: 2022-05-13 | End: 2022-05-19 | Stop reason: HOSPADM

## 2022-05-13 RX ORDER — LISINOPRIL 20 MG/1
20 TABLET ORAL DAILY
Status: DISCONTINUED | OUTPATIENT
Start: 2022-05-13 | End: 2022-05-19 | Stop reason: HOSPADM

## 2022-05-13 RX ORDER — BUDESONIDE AND FORMOTEROL FUMARATE DIHYDRATE 160; 4.5 UG/1; UG/1
2 AEROSOL RESPIRATORY (INHALATION) 2 TIMES DAILY
Status: DISCONTINUED | OUTPATIENT
Start: 2022-05-13 | End: 2022-05-13 | Stop reason: CLARIF

## 2022-05-13 RX ORDER — ALBUTEROL SULFATE 2.5 MG/3ML
2.5 SOLUTION RESPIRATORY (INHALATION) EVERY 4 HOURS PRN
Status: DISCONTINUED | OUTPATIENT
Start: 2022-05-13 | End: 2022-05-13 | Stop reason: CLARIF

## 2022-05-13 RX ORDER — TRAMADOL HYDROCHLORIDE 50 MG/1
50 TABLET ORAL EVERY 8 HOURS PRN
Status: DISCONTINUED | OUTPATIENT
Start: 2022-05-13 | End: 2022-05-19 | Stop reason: HOSPADM

## 2022-05-13 RX ORDER — ARFORMOTEROL TARTRATE 15 UG/2ML
15 SOLUTION RESPIRATORY (INHALATION) 2 TIMES DAILY
Status: DISCONTINUED | OUTPATIENT
Start: 2022-05-13 | End: 2022-05-13 | Stop reason: CLARIF

## 2022-05-13 RX ORDER — CLONAZEPAM 0.5 MG/1
1 TABLET ORAL 2 TIMES DAILY
Status: DISCONTINUED | OUTPATIENT
Start: 2022-05-13 | End: 2022-05-19 | Stop reason: HOSPADM

## 2022-05-13 RX ORDER — SODIUM CHLORIDE 9 MG/ML
INJECTION, SOLUTION INTRAVENOUS CONTINUOUS
Status: DISCONTINUED | OUTPATIENT
Start: 2022-05-13 | End: 2022-05-16

## 2022-05-13 RX ORDER — MORPHINE SULFATE 2 MG/ML
2 INJECTION, SOLUTION INTRAMUSCULAR; INTRAVENOUS ONCE
Status: COMPLETED | OUTPATIENT
Start: 2022-05-13 | End: 2022-05-13

## 2022-05-13 RX ORDER — SODIUM CHLORIDE 9 MG/ML
1000 INJECTION, SOLUTION INTRAVENOUS CONTINUOUS
Status: DISCONTINUED | OUTPATIENT
Start: 2022-05-13 | End: 2022-05-13

## 2022-05-13 RX ORDER — ENOXAPARIN SODIUM 100 MG/ML
40 INJECTION SUBCUTANEOUS EVERY 24 HOURS
Status: DISCONTINUED | OUTPATIENT
Start: 2022-05-13 | End: 2022-05-13

## 2022-05-13 RX ORDER — BENAZEPRIL HYDROCHLORIDE 10 MG/1
20 TABLET ORAL DAILY
Status: DISCONTINUED | OUTPATIENT
Start: 2022-05-13 | End: 2022-05-13 | Stop reason: CLARIF

## 2022-05-13 RX ORDER — ASCORBIC ACID 500 MG
500 TABLET ORAL DAILY
Status: DISCONTINUED | OUTPATIENT
Start: 2022-05-13 | End: 2022-05-19 | Stop reason: HOSPADM

## 2022-05-13 RX ORDER — CHOLECALCIFEROL (VITAMIN D3) 50 MCG
2000 TABLET ORAL DAILY
Status: DISCONTINUED | OUTPATIENT
Start: 2022-05-13 | End: 2022-05-19 | Stop reason: HOSPADM

## 2022-05-13 RX ORDER — AZITHROMYCIN 250 MG/1
250 TABLET, FILM COATED ORAL DAILY
Status: COMPLETED | OUTPATIENT
Start: 2022-05-13 | End: 2022-05-17

## 2022-05-13 RX ADMIN — LISINOPRIL 20 MG: 20 TABLET ORAL at 18:14

## 2022-05-13 RX ADMIN — ALBUTEROL SULFATE 2.5 MG: 2.5 SOLUTION RESPIRATORY (INHALATION) at 12:31

## 2022-05-13 RX ADMIN — DOXEPIN HYDROCHLORIDE 100 MG: 50 CAPSULE ORAL at 22:51

## 2022-05-13 RX ADMIN — Medication 2000 UNITS: at 18:13

## 2022-05-13 RX ADMIN — PANTOPRAZOLE SODIUM 40 MG: 40 TABLET, DELAYED RELEASE ORAL at 18:15

## 2022-05-13 RX ADMIN — ZINC SULFATE 220 MG (50 MG) CAPSULE 50 MG: CAPSULE at 18:14

## 2022-05-13 RX ADMIN — AZITHROMYCIN 250 MG: 250 TABLET, FILM COATED ORAL at 22:51

## 2022-05-13 RX ADMIN — BUDESONIDE AND FORMOTEROL FUMARATE DIHYDRATE 2 PUFF: 160; 4.5 AEROSOL RESPIRATORY (INHALATION) at 22:51

## 2022-05-13 RX ADMIN — TRIMETHOBENZAMIDE HYDROCHLORIDE 200 MG: 100 INJECTION INTRAMUSCULAR at 22:54

## 2022-05-13 RX ADMIN — PAROXETINE 30 MG: 10 TABLET, FILM COATED ORAL at 22:51

## 2022-05-13 RX ADMIN — SODIUM CHLORIDE: 9 INJECTION, SOLUTION INTRAVENOUS at 18:22

## 2022-05-13 RX ADMIN — CLONAZEPAM 1 MG: 0.5 TABLET ORAL at 18:14

## 2022-05-13 RX ADMIN — FUROSEMIDE 20 MG: 20 TABLET ORAL at 18:15

## 2022-05-13 RX ADMIN — IOPAMIDOL 75 ML: 755 INJECTION, SOLUTION INTRAVENOUS at 13:10

## 2022-05-13 RX ADMIN — MORPHINE SULFATE 2 MG: 2 INJECTION, SOLUTION INTRAMUSCULAR; INTRAVENOUS at 12:36

## 2022-05-13 RX ADMIN — ATORVASTATIN CALCIUM 20 MG: 20 TABLET, FILM COATED ORAL at 22:51

## 2022-05-13 RX ADMIN — TRAMADOL HYDROCHLORIDE 50 MG: 50 TABLET, COATED ORAL at 18:14

## 2022-05-13 RX ADMIN — METOPROLOL TARTRATE 50 MG: 50 TABLET, FILM COATED ORAL at 22:52

## 2022-05-13 RX ADMIN — DEXAMETHASONE SODIUM PHOSPHATE 10 MG: 10 INJECTION INTRAMUSCULAR; INTRAVENOUS at 12:35

## 2022-05-13 RX ADMIN — TRIMETHOBENZAMIDE HYDROCHLORIDE 200 MG: 100 INJECTION INTRAMUSCULAR at 12:35

## 2022-05-13 RX ADMIN — ENOXAPARIN SODIUM 40 MG: 100 INJECTION SUBCUTANEOUS at 18:16

## 2022-05-13 RX ADMIN — ASPIRIN 81 MG: 81 TABLET, COATED ORAL at 18:14

## 2022-05-13 RX ADMIN — OXYCODONE HYDROCHLORIDE AND ACETAMINOPHEN 500 MG: 500 TABLET ORAL at 18:15

## 2022-05-13 ASSESSMENT — PAIN SCALES - GENERAL
PAINLEVEL_OUTOF10: 8
PAINLEVEL_OUTOF10: 4

## 2022-05-13 ASSESSMENT — ENCOUNTER SYMPTOMS
NAUSEA: 1
DIARRHEA: 1
BACK PAIN: 0
APNEA: 0
COUGH: 1
VOMITING: 1
EYE REDNESS: 0

## 2022-05-13 NOTE — ED NOTES
Unable to assess pulse ox on room air as patient is on 4L O2 at home. Patient states lately she has not been able to walk well and has become increasingly week the last few weeks.      Jessica Nunez RN  05/13/22 1440

## 2022-05-13 NOTE — ED NOTES
Patient states she has had suicidal thoughts but no plans of actually hurting herself. Denies plans to hurt herself, denies thoughts of harming others. States her son passed away about two weeks ago which has made her increasingly sad.      Robi Javier RN  05/13/22 [id number]

## 2022-05-13 NOTE — ED NOTES
Patient unable to ambulate with RN. States she has been becoming increasingly weak and at home and has been unable to ambulate around house. Patient states she has been bed bound for last few weeks. Provider notified.      Lucero Causey RN  05/13/22 5994

## 2022-05-13 NOTE — ED PROVIDER NOTES
Jim Carlos is a 77 y.o. female presenting to the ED for covid 19, beginning 2 weeks ago. The complaint has been persistent, moderate in severity, and worsened by nothing. 78 yo f who tested positive for covid on 22. Pt has history of copd and aortic stenosis. Pt notes she has been ahving nausea vomiting, generalized weakness, pain in stomach and back, incrase cough and sinus drainage pt has had worsening sob. Pt was at a family members  where other families members tested positive for covid. Pt hasnt taken anything for any of her sx. She has had loss of appetite and has barely eaten for 7 days per daughter,. Pt f/w dr Kevin Baker, and previously saw dr Makayla Dallas and dr pendleton but hasnt seen either in years. Pt is on 3.5lo2nc at home all the time. Pt quit smoking 3 yeas ago. Pt is not vaccinated for covid    Review of Systems:   Review of Systems   Constitutional: Negative for diaphoresis and unexpected weight change. HENT: Negative for congestion and ear pain. Eyes: Negative for redness and visual disturbance. Respiratory: Positive for cough. Negative for apnea. Gastrointestinal: Positive for diarrhea, nausea and vomiting. Endocrine: Negative for polydipsia and polyuria. Genitourinary: Negative for difficulty urinating and dysuria. Musculoskeletal: Negative for back pain and neck pain. Allergic/Immunologic: Negative for food allergies and immunocompromised state. Neurological: Negative for dizziness and headaches.    Psychiatric/Behavioral: Negative for agitation and decreased concentration.                 --------------------------------------------- PAST HISTORY ---------------------------------------------  Past Medical History:  has a past medical history of Anxiety and depression, Arthritis, Asthma, Borderline diabetes, CAD (coronary artery disease), CHF (congestive heart failure) ([facility] [address] Chronic back pain, [facility] Diverticulosis, GERD (gastroesophageal reflux disease), Hyperlipidemia, Hypertension, Left rotator cuff tear, Pneumonia, PONV (postoperative nausea and vomiting), Prolonged emergence from general anesthesia, and TIA (transient ischemic attack). Past Surgical History:  has a past surgical history that includes Dental surgery; Hemorrhoid surgery; Appendectomy; Diagnostic Cardiac Cath Lab Procedure (07/26/10); Diagnostic Cardiac Cath Lab Procedure (10/04/10); Diagnostic Cardiac Cath Lab Procedure (11/22/11); Coronary angioplasty (10/04/10); Aortic valve replacement (11/28/11); Chest surgery (11/28/11); Cardiac catheterization (Right, 1-8-2013); ECHO Transesophageal (1/27/2014); Cardiac catheterization (2/10/15); Hysterectomy (1980); Abdomen surgery (1969); eye surgery; Colonoscopy; Endoscopy, colon, diagnostic; brain surgery (2000); and Cardiac valve replacement (6/17/15). Social History:  reports that she quit smoking about 5 years ago. Her smoking use included cigarettes. She started smoking about 59 years ago. She has a 75.00 pack-year smoking history. She has never used smokeless tobacco. She reports that she does not drink alcohol and does not use drugs. Family History: family history includes Asthma in her father; Cancer in her brother, brother, brother, sister, and sister; Emphysema in her brother, brother, and father; Heart Disease in her brother, brother, father, and mother; Stroke in her brother and mother. The patients home medications have been reviewed.     Allergies: Codeine and Pain patch [menthol]    -------------------------------------------------- RESULTS -------------------------------------------------  All laboratory and radiology results have been personally reviewed by myself   LABS:  Results for orders placed or performed during the hospital encounter of 05/13/22   CBC with Auto Differential   Result Value Ref Range    WBC 4.3 (L) 4.5 - 11.5 E9/L    RBC 4.49 3.50 - 5.50 E12/L Hemoglobin 13.6 11.5 - 15.5 g/dL    Hematocrit 40.9 34.0 - 48.0 %    MCV 91.1 80.0 - 99.9 fL    MCH 30.3 26.0 - 35.0 pg    MCHC 33.3 32.0 - 34.5 %    RDW 12.4 11.5 - 15.0 fL    Platelets 899 949 - 171 E9/L    MPV 10.4 7.0 - 12.0 fL    Neutrophils % 82.6 (H) 43.0 - 80.0 %    Lymphocytes % 4.3 (L) 20.0 - 42.0 %    Monocytes % 8.7 2.0 - 12.0 %    Eosinophils % 0.0 0.0 - 6.0 %    Basophils % 0.0 0.0 - 2.0 %    Neutrophils Absolute 3.61 1.80 - 7.30 E9/L    Lymphocytes Absolute 0.34 (L) 1.50 - 4.00 E9/L    Monocytes Absolute 0.39 0.10 - 0.95 E9/L    Eosinophils Absolute 0.00 (L) 0.05 - 0.50 E9/L    Basophils Absolute 0.00 0.00 - 0.20 E9/L    Atypical Lymphocytes Relative 3.5 0.0 - 4.0 %    Metamyelocytes Relative 0.9 0.0 - 1.0 %    nRBC 0.0 /100 WBC    RBC Morphology Normal    Comprehensive Metabolic Panel w/ Reflex to MG   Result Value Ref Range    Sodium 136 132 - 146 mmol/L    Potassium reflex Magnesium 3.3 (L) 3.5 - 5.0 mmol/L    Chloride 96 (L) 98 - 107 mmol/L    CO2 24 22 - 29 mmol/L    Anion Gap 16 7 - 16 mmol/L    Glucose 108 (H) 74 - 99 mg/dL    BUN 12 6 - 23 mg/dL    CREATININE 0.8 0.5 - 1.0 mg/dL    GFR Non-African American >60 >=60 mL/min/1.73    GFR African American >60     Calcium 8.5 (L) 8.6 - 10.2 mg/dL    Total Protein 6.5 6.4 - 8.3 g/dL    Albumin 3.5 3.5 - 5.2 g/dL    Total Bilirubin 0.9 0.0 - 1.2 mg/dL    Alkaline Phosphatase 63 35 - 104 U/L    ALT 9 0 - 32 U/L    AST 22 0 - 31 U/L   Troponin   Result Value Ref Range    Troponin, High Sensitivity 23 (H) 0 - 9 ng/L   Brain Natriuretic Peptide   Result Value Ref Range    Pro- 0 - 450 pg/mL   Protime-INR   Result Value Ref Range    Protime 13.2 (H) 9.3 - 12.4 sec    INR 1.2    APTT   Result Value Ref Range    aPTT 32.0 24.5 - 35.1 sec   D-Dimer, Quantitative   Result Value Ref Range    D-Dimer, Quant 417 ng/mL DDU   Sedimentation Rate   Result Value Ref Range    Sed Rate 55 (H) 0 - 20 mm/Hr   Lactic Acid   Result Value Ref Range    Lactic Acid 1.1 0.5 - 2.2 mmol/L   Magnesium   Result Value Ref Range    Magnesium 2.0 1.6 - 2.6 mg/dL   EKG 12 Lead   Result Value Ref Range    Ventricular Rate 104 BPM    Atrial Rate 104 BPM    P-R Interval 138 ms    QRS Duration 76 ms    Q-T Interval 408 ms    QTc Calculation (Bazett) 536 ms    P Axis 47 degrees    R Axis 31 degrees    T Axis 42 degrees       RADIOLOGY:  Interpreted by Radiologist.  CTA PULMONARY W CONTRAST   Final Result   Limited CTA chest due to motion artifact. No large occlusive pulmonary   emboli are identified on this examination. Bilateral predominantly peripheral and lower lobe ground-glass infiltrates   consistent with COVID pneumonitis. Cardiomegaly and atherosclerosis with evidence of previous sternotomy   procedure. Small hiatal hernia. XR CHEST PORTABLE   Final Result   1. Patchy right lower lobe airspace disease   2. Emphysematous changes             ------------------------- NURSING NOTES AND VITALS REVIEWED ---------------------------   The nursing notes within the ED encounter and vital signs as below have been reviewed. /86   Pulse 99   Temp 98.4 °F (36.9 °C) (Oral)   Resp 16   Ht 5' 4\" (1.626 m)   Wt 215 lb (97.5 kg)   SpO2 97%   BMI 36.90 kg/m²   Oxygen Saturation Interpretation: Abnormal - but at baseline      ---------------------------------------------------PHYSICAL EXAM--------------------------------------    Physical Exam  Vitals reviewed. Constitutional:       General: She is not in acute distress. Appearance: Normal appearance. She is ill-appearing. She is not toxic-appearing. HENT:      Head: Normocephalic and atraumatic. Right Ear: External ear normal.      Left Ear: External ear normal.      Nose: Nose normal. No congestion. Mouth/Throat:      Mouth: Mucous membranes are moist.      Pharynx: Oropharynx is clear. No posterior oropharyngeal erythema. Eyes:      Extraocular Movements: Extraocular movements intact. Pupils: Pupils are equal, round, and reactive to light. Cardiovascular:      Rate and Rhythm: Normal rate and regular rhythm. Pulses: Normal pulses. Heart sounds: No murmur heard. Pulmonary:      Effort: Pulmonary effort is normal. No respiratory distress. Breath sounds: Wheezing present. No rhonchi. Chest:      Chest wall: No tenderness. Abdominal:      General: Bowel sounds are normal.      Tenderness: There is no abdominal tenderness. There is no right CVA tenderness, left CVA tenderness or guarding. Musculoskeletal:         General: No swelling or deformity. Cervical back: Normal range of motion and neck supple. No muscular tenderness. Right lower leg: Edema present. Skin:     General: Skin is warm and dry. Capillary Refill: Capillary refill takes less than 2 seconds. Findings: No erythema. Neurological:      General: No focal deficit present. Mental Status: She is alert and oriented to person, place, and time. Sensory: No sensory deficit. Motor: No weakness. Psychiatric:         Mood and Affect: Mood normal.                 ------------------------------ ED COURSE/MEDICAL DECISION MAKING----------------------  Medications   0.9 % sodium chloride infusion (has no administration in time range)   albuterol (PROVENTIL) nebulizer solution 2.5 mg (2.5 mg Nebulization Given 5/13/22 1231)   dexamethasone (DECADRON) injection 10 mg (10 mg IntraVENous Given 5/13/22 1235)   morphine (PF) injection 2 mg (2 mg IntraVENous Given 5/13/22 1236)   trimethobenzamide (TIGAN) injection 200 mg (200 mg IntraMUSCular Given 5/13/22 1235)   iopamidol (ISOVUE-370) 76 % injection 75 mL (75 mLs IntraVENous Given 5/13/22 1310)     EKG: This EKG is signed and interpreted by me.     Time:5/13/22 1150  Rate: 104  Rhythm: Sinus  Interpretation: prolong qtc  Comparison: changes compared to previous EKG      ED COURSE:       Medical Decision Making:    Found to have COVID-pneumonia. She is stable on her home oxygen therapy. She is not a monoclonal antibody candidate she is not a Paxil bed candidate. Symptoms have been for about 10 days. She was vomiting multiple times at home. Appears dehydrated. Is weak cannot ambulate. Case reviewed with her family doctor who admit her. She may need to go to rehab likely will need PT OT evaluations. Family doctor is requesting inpatient pulmonary evaluation. Patient was given dexamethasone. Counseling: The emergency provider has spoken with the patient and discussed todays results, in addition to providing specific details for the plan of care and counseling regarding the diagnosis and prognosis. Questions are answered at this time and they are agreeable with the plan.      --------------------------------- IMPRESSION AND DISPOSITION ---------------------------------    IMPRESSION  1. COVID-19    2. Non-intractable vomiting with nausea, unspecified vomiting type    3. Unable to ambulate        DISPOSITION  Disposition: Admit to telemetry  Patient condition is fair      NOTE: This report was transcribed using voice recognition software.  Every effort was made to ensure accuracy; however, inadvertent computerized transcription errors may be present       Ambar Adams DO  05/13/22 1546

## 2022-05-13 NOTE — PROGRESS NOTES
Admission database completed to best of this RN's ability. Pharmacy tech to review medication list. Care plan and education initiated. Pt from home with daughter-in-law. Denies any assistive devices with ambulation; has a bedside commode and shower chair at home. Wears 3-4L n/c continuously via concentrator. Per daughter, her concentrator has not been working but Akhil Locke come out and fix it. Pt has been using son's concentrator that passed away recently. Denies any [place] [id number] services prior to admission.

## 2022-05-14 LAB
ALBUMIN SERPL-MCNC: 3.3 G/DL (ref 3.5–5.2)
ALP BLD-CCNC: 58 U/L (ref 35–104)
ALT SERPL-CCNC: 11 U/L (ref 0–32)
ANION GAP SERPL CALCULATED.3IONS-SCNC: 14 MMOL/L (ref 7–16)
AST SERPL-CCNC: 19 U/L (ref 0–31)
BILIRUB SERPL-MCNC: 0.7 MG/DL (ref 0–1.2)
BUN BLDV-MCNC: 14 MG/DL (ref 6–23)
C-REACTIVE PROTEIN: 2.9 MG/DL (ref 0–0.4)
CALCIUM SERPL-MCNC: 8.5 MG/DL (ref 8.6–10.2)
CHLORIDE BLD-SCNC: 98 MMOL/L (ref 98–107)
CO2: 27 MMOL/L (ref 22–29)
CREAT SERPL-MCNC: 0.8 MG/DL (ref 0.5–1)
GFR AFRICAN AMERICAN: >60
GFR NON-AFRICAN AMERICAN: >60 ML/MIN/1.73
GLUCOSE BLD-MCNC: 181 MG/DL (ref 74–99)
HCT VFR BLD CALC: 38.9 % (ref 34–48)
HEMOGLOBIN: 12.7 G/DL (ref 11.5–15.5)
MCH RBC QN AUTO: 30 PG (ref 26–35)
MCHC RBC AUTO-ENTMCNC: 32.6 % (ref 32–34.5)
MCV RBC AUTO: 92 FL (ref 80–99.9)
PDW BLD-RTO: 12.2 FL (ref 11.5–15)
PLATELET # BLD: 237 E9/L (ref 130–450)
PMV BLD AUTO: 10.4 FL (ref 7–12)
POTASSIUM SERPL-SCNC: 3.5 MMOL/L (ref 3.5–5)
PROCALCITONIN: 0.08 NG/ML (ref 0–0.08)
RBC # BLD: 4.23 E12/L (ref 3.5–5.5)
SEDIMENTATION RATE, ERYTHROCYTE: 40 MM/HR (ref 0–20)
SODIUM BLD-SCNC: 139 MMOL/L (ref 132–146)
TOTAL PROTEIN: 6 G/DL (ref 6.4–8.3)
WBC # BLD: 1.7 E9/L (ref 4.5–11.5)

## 2022-05-14 PROCEDURE — 85651 RBC SED RATE NONAUTOMATED: CPT

## 2022-05-14 PROCEDURE — 6360000002 HC RX W HCPCS: Performed by: GENERAL PRACTICE

## 2022-05-14 PROCEDURE — 1200000000 HC SEMI PRIVATE

## 2022-05-14 PROCEDURE — 84145 PROCALCITONIN (PCT): CPT

## 2022-05-14 PROCEDURE — 86140 C-REACTIVE PROTEIN: CPT

## 2022-05-14 PROCEDURE — 80053 COMPREHEN METABOLIC PANEL: CPT

## 2022-05-14 PROCEDURE — 6370000000 HC RX 637 (ALT 250 FOR IP): Performed by: GENERAL PRACTICE

## 2022-05-14 PROCEDURE — 85027 COMPLETE CBC AUTOMATED: CPT

## 2022-05-14 PROCEDURE — 36415 COLL VENOUS BLD VENIPUNCTURE: CPT

## 2022-05-14 PROCEDURE — 2700000000 HC OXYGEN THERAPY PER DAY

## 2022-05-14 RX ADMIN — CLONAZEPAM 1 MG: 0.5 TABLET ORAL at 17:48

## 2022-05-14 RX ADMIN — CLONAZEPAM 1 MG: 0.5 TABLET ORAL at 09:37

## 2022-05-14 RX ADMIN — BUDESONIDE AND FORMOTEROL FUMARATE DIHYDRATE 2 PUFF: 160; 4.5 AEROSOL RESPIRATORY (INHALATION) at 09:39

## 2022-05-14 RX ADMIN — METOPROLOL TARTRATE 50 MG: 50 TABLET, FILM COATED ORAL at 09:37

## 2022-05-14 RX ADMIN — TRAMADOL HYDROCHLORIDE 50 MG: 50 TABLET, COATED ORAL at 22:55

## 2022-05-14 RX ADMIN — TRAMADOL HYDROCHLORIDE 50 MG: 50 TABLET, COATED ORAL at 12:25

## 2022-05-14 RX ADMIN — PAROXETINE 30 MG: 10 TABLET, FILM COATED ORAL at 09:38

## 2022-05-14 RX ADMIN — ATORVASTATIN CALCIUM 20 MG: 20 TABLET, FILM COATED ORAL at 22:51

## 2022-05-14 RX ADMIN — METOPROLOL TARTRATE 50 MG: 50 TABLET, FILM COATED ORAL at 22:51

## 2022-05-14 RX ADMIN — PANTOPRAZOLE SODIUM 40 MG: 40 TABLET, DELAYED RELEASE ORAL at 17:48

## 2022-05-14 RX ADMIN — DEXAMETHASONE SODIUM PHOSPHATE 10 MG: 10 INJECTION INTRAMUSCULAR; INTRAVENOUS at 09:39

## 2022-05-14 RX ADMIN — ASPIRIN 81 MG: 81 TABLET, COATED ORAL at 09:37

## 2022-05-14 RX ADMIN — ENOXAPARIN SODIUM 30 MG: 100 INJECTION SUBCUTANEOUS at 09:38

## 2022-05-14 RX ADMIN — AZITHROMYCIN 250 MG: 250 TABLET, FILM COATED ORAL at 09:38

## 2022-05-14 RX ADMIN — OXYCODONE HYDROCHLORIDE AND ACETAMINOPHEN 500 MG: 500 TABLET ORAL at 09:38

## 2022-05-14 RX ADMIN — DOXEPIN HYDROCHLORIDE 100 MG: 50 CAPSULE ORAL at 22:50

## 2022-05-14 RX ADMIN — LISINOPRIL 20 MG: 20 TABLET ORAL at 09:37

## 2022-05-14 RX ADMIN — ZINC SULFATE 220 MG (50 MG) CAPSULE 50 MG: CAPSULE at 09:37

## 2022-05-14 RX ADMIN — BUDESONIDE AND FORMOTEROL FUMARATE DIHYDRATE 2 PUFF: 160; 4.5 AEROSOL RESPIRATORY (INHALATION) at 22:52

## 2022-05-14 RX ADMIN — ENOXAPARIN SODIUM 30 MG: 100 INJECTION SUBCUTANEOUS at 22:51

## 2022-05-14 RX ADMIN — Medication 2000 UNITS: at 09:38

## 2022-05-14 RX ADMIN — FUROSEMIDE 20 MG: 20 TABLET ORAL at 09:37

## 2022-05-14 ASSESSMENT — PAIN DESCRIPTION - ORIENTATION
ORIENTATION: RIGHT
ORIENTATION: LOWER;UPPER;RIGHT;LEFT

## 2022-05-14 ASSESSMENT — PAIN DESCRIPTION - DESCRIPTORS
DESCRIPTORS: DISCOMFORT
DESCRIPTORS: SORE;SHARP

## 2022-05-14 ASSESSMENT — PAIN - FUNCTIONAL ASSESSMENT: PAIN_FUNCTIONAL_ASSESSMENT: PREVENTS OR INTERFERES SOME ACTIVE ACTIVITIES AND ADLS

## 2022-05-14 ASSESSMENT — PAIN SCALES - GENERAL
PAINLEVEL_OUTOF10: 8
PAINLEVEL_OUTOF10: 8

## 2022-05-14 ASSESSMENT — PAIN DESCRIPTION - LOCATION
LOCATION: BACK;LEG
LOCATION: ARM

## 2022-05-14 NOTE — CONSULTS
[facility]  [address], [address]  [facility], [address] [place] [address]    Pulmonary Consultation      Reason for Consult:  sob    Requesting Physician:  Jennyfer Gotti DO    CHIEF COMPLAINT:  sob    History Obtained From:  patient    HISTORY OF PRESENT ILLNESS:                The patient is a 77 y.o. female with significant past medical history of COPD who presents with increased sob for the past week, worsening with cough. Mentions usually with sob, on about 3-4 li NC as out-pt, hasn't been to pulmonologist for past 3 yrs but claims has been using lung medications regularly. She is unvaccinated.     Past Medical History:        Diagnosis Date    Anxiety and depression     Arthritis     Asthma     Borderline diabetes     CAD (coronary artery disease)     CHF (congestive heart failure) (HCC)     Chronic back pain     COPD (chronic obstructive pulmonary disease) (HCC)     Diverticulosis     GERD (gastroesophageal reflux disease)     Hyperlipidemia     Hypertension     Left rotator cuff tear     Pneumonia 2012    PONV (postoperative nausea and vomiting)     Prolonged emergence from general anesthesia     TIA (transient ischemic attack) 9/2012       Past Surgical History:        Procedure Laterality Date    ABDOMEN SURGERY  1969    hemmorhage after vag delivery    AORTIC VALVE REPLACEMENT  11/28/11    REPLACEMENT AV W/21 MM MEDTRONIC MOSAIC TISSUE PROSTHESIS (DR. FENG)    APPENDECTOMY      BRAIN SURGERY  2000    tri geminal nerve [facility]    CARDIAC CATHETERIZATION Right 1-8-2013    Dr. Ariel Pollock  2/10/15    Dr Christy Kenny  6/17/15    AVR    CHEST SURGERY  11/28/11    POSTOP RE-EXPLORATION FOR BLEEDING FROM STERNAL WIRE    COLONOSCOPY      CORONARY ANGIOPLASTY  10/04/10    SUCCESSFUL GABRIEL OF PROXIMAL LAD, SUCCESSFUL GABRIEL OF RCA (PROXIMAL, MID AND DISTAL)    DENTAL SURGERY      full mouth teeth extracted    DIAGNOSTIC CARDIAC CATH LAB PROCEDURE  07/26/10    STENOTIC CAD, MILD AORTIC STENOSI & EFOF 70%.     DIAGNOSTIC CARDIAC CATH LAB PROCEDURE  10/04/10    DIAGNOSTIC CARDIAC CATH LAB PROCEDURE  11/22/11    PREVIOUSLY PLACED STENTS IN LAD/RCA PATENT    ECHOCARDIOGRAM TRANSESOPHAGEAL  1/27/2014         ENDOSCOPY, COLON, DIAGNOSTIC      EYE SURGERY      lan lense implants    HEMORRHOID SURGERY      HYSTERECTOMY  1980       Current Medications:    Current Facility-Administered Medications: aspirin EC tablet 81 mg, 81 mg, Oral, Daily  clonazePAM (KLONOPIN) tablet 1 mg, 1 mg, Oral, BID  doxepin (SINEQUAN) capsule 100 mg, 100 mg, Oral, Nightly  furosemide (LASIX) tablet 20 mg, 20 mg, Oral, Daily  metoprolol tartrate (LOPRESSOR) tablet 50 mg, 50 mg, Oral, BID  PARoxetine (PAXIL) tablet 30 mg, 30 mg, Oral, Daily  traMADol (ULTRAM) tablet 50 mg, 50 mg, Oral, Q8H PRN  azithromycin (ZITHROMAX) tablet 250 mg, 250 mg, Oral, Daily  0.9 % sodium chloride infusion, , IntraVENous, Continuous  ascorbic acid (VITAMIN C) tablet 500 mg, 500 mg, Oral, Daily  zinc sulfate (ZINCATE) capsule 50 mg, 50 mg, Oral, Daily  vitamin D (CHOLECALCIFEROL) tablet 2,000 Units, 2,000 Units, Oral, Daily  acetaminophen (TYLENOL) tablet 650 mg, 650 mg, Oral, Q4H PRN  atorvastatin (LIPITOR) tablet 20 mg, 20 mg, Oral, Nightly  dexamethasone (PF) (DECADRON) injection 10 mg, 10 mg, IntraVENous, Daily  pantoprazole (PROTONIX) tablet 40 mg, 40 mg, Oral, QPM  lisinopril (PRINIVIL;ZESTRIL) tablet 20 mg, 20 mg, Oral, Daily  albuterol sulfate  (90 Base) MCG/ACT inhaler 2 puff, 2 puff, Inhalation, Q4H PRN  budesonide-formoterol (SYMBICORT) 160-4.5 MCG/ACT inhaler 2 puff, 2 puff, Inhalation, BID  trimethobenzamide (TIGAN) injection 200 mg, 200 mg, IntraMUSCular, Q6H PRN  enoxaparin Sodium (LOVENOX) injection 30 mg, 30 mg, SubCUTAneous, BID    Allergies:  Codeine and Pain patch [menthol]    Social History:    TOBACCO:   reports that she quit smoking about 3 years ago. Her smoking use included cigarettes. She started smoking about 58 years ago. She has a 82.50 pack-year smoking history. She has never used smokeless tobacco.    Family History:       Problem Relation Age of Onset    Stroke Mother     Heart Disease Mother     Heart Disease Father     Asthma Father     Emphysema Father     Cancer Sister     Cancer Brother     Cancer Sister     Cancer Brother     Cancer Brother     Heart Disease Brother     Stroke Brother     Heart Disease Brother     Emphysema Brother     Emphysema Brother        REVIEW OF SYSTEMS:    RESPIRATORY:  Sob and cough  Remainder of complete ROS is negative. PHYSICAL EXAM:      Vitals:    /63   Pulse 62   Temp 97.8 °F (36.6 °C) (Oral)   Resp 16   Ht 5' 4\" (1.626 m)   Wt 208 lb 12.8 oz (94.7 kg)   SpO2 94%   BMI 35.84 kg/m²     CONSTITUTIONAL:  obese  EYES:  Lids and lashes normal, pupils equal, round and reactive to light, extra ocular muscles intact, sclera clear, conjunctiva normal  ENT:  Normocephalic, without obvious abnormality, atraumatic, sinuses nontender on palpation, external ears without lesions, oral pharynx with moist mucus membranes, tonsils without erythema or exudates, gums normal and good dentition. NECK:  Supple, symmetrical, trachea midline, no adenopathy, thyroid symmetric, not enlarged and no tenderness, skin normal  LUNGS:  Bilateral wheezing/ronchi with cough  CARDIOVASCULAR:  Normal apical impulse, regular rate and rhythm, normal S1 and S2, no S3 or S4, and no murmur noted  ABDOMEN:  No scars, normal bowel sounds, soft, non-distended, non-tender, no masses palpated, no hepatosplenomegally  MUSCULOSKELETAL:  There is no redness, warmth, or swelling of the joints. NEUROLOGIC:  Awake, alert, oriented to name, place and time. Cranial nerves II-XII are grossly intact.     DATA:    CBC: Recent Labs     05/13/22  1122 05/14/22  0159   WBC 4.3* 1.7*   HGB 13.6 12.7   HCT 40.9 38.9   MCV 91.1 92.0    237       BMP:  Recent Labs     05/13/22  1122 05/14/22  0159    139   K 3.3* 3.5   CL 96* 98   CO2 24 27   BUN 12 14   CREATININE 0.8 0.8    ALB:3,BILIDIR:3,BILITOT:3,ALKPHOS:3)@    PT/INR:   Recent Labs     05/13/22  1122   PROTIME 13.2*   INR 1.2       ABG:   No results for input(s): PH, PO2, PCO2, HCO3, BE, O2SAT, METHB, O2HB, COHB, O2CON, HHB, THB in the last 72 hours. Radiology Review:  CTA chest reviewed with (-) PE and small areas of GG opacities bilateral lung    IMPRESSION/RECOMMENDATIONS:      COVID  Hypoxia  COPD    1. Cont with steroids, will switch to bid dosing  2. Cont with mdi, observe respiratory function  3. Cont with oxygen, taper as tolerated. At baseline with oxygen use, will see if will require baricitinib if worsens  4. Observe inflammatory markers  5. Incentive spirometer use and prone positioning when able to      Time at the bedside, reviewing labs and radiographs, reviewing notes and consultations, discussing with staff and family was more than 55 minutes. Thanks for letting us see this patient in consultation. Please contact us with any questions. Office [phone] or after hours through [employer], x [phone].

## 2022-05-14 NOTE — PROGRESS NOTES
Spoke to dr Maria Luz Schultz on pt request for stronger pain medication for her arm that is sore from a IM injection she got in the ed.

## 2022-05-14 NOTE — H&P
[address] [address]                       PREOPERATIVE HISTORY AND PHYSICAL    PATIENT NAME: Flo Pennington                     :        1945  MED REC NO:   [id number]                            ROOM:       0533  ACCOUNT NO:   [de-identified]                           ADMIT DATE: 2022  PROVIDER:     Damon Kauffman DO    HISTORY OF PRESENT ILLNESS:  This is a 71-year-old white female,  moderately ill, presented to the emergency department after she  knowingly tested positive for COVID two days prior to admission. She  has a cough, weakness, shortness of breath, nausea, and vomiting. Also  has a longstanding history of COPD and coronary artery disease. She is  on chronic oxygen therapy at home. She states that she feels very weak  and sick, chilled, no appetite, and nausea and vomiting. She was  subsequently admitted after a CT scan showed bilateral ground-glass  infiltrates in the lungs. She was oxygenating okay at 95% on 4 liters. PAST MEDICAL HISTORY:  Significant for anxiety, depression, generalized  arthritis, degenerative disk disease of lumbar spine, hyperlipidemia,  coronary artery disease, congestive heart failure, chronic low back  pain, COPD, diverticulosis, GERD, hypertension, rotator cuff,  tendinitis, and pneumonia. PAST SURGICAL HISTORY:  Significant for hemorrhoids, appendectomy, heart  cath, aortic valve replacement, coronary artery bypass graft surgery,  multiple echocardiograms, hysterectomy, eye surgery, colonoscopy, and  endoscopies. SOCIAL HISTORY:  The patient is a previous smoker. Has not smoked for  five years. FAMILY HISTORY:  Significant in that father had asthma, cancer in a  brother, and three brothers and two sisters with emphysema. ALLERGIES:  She has allergies to CODEINE.     RECENT AND CURRENT MEDICATIONS:  Included Sinequan at bedtime,  metoprolol 50 b.i.d., atorvastatin 20 daily, Symbicort 164.5, aspirin,  Klonopin, Lasix, Paxil, vitamin C, vitamin D, Protonix, and lisinopril. DIAGNOSTIC STUDIES:  Chest x-ray showed patchy right lobe airspace  disease and emphysema. CTA of the chest again bilateral COVID  pneumonia. REVIEW OF SYSTEMS:  Negative for vertigo, cephalgia, ringing in the  ears, hearing loss, difficulty swallowing, hoarseness in her voice, or  bloody noses. She has a history of coronary disease, valve replacement,  and bypass graft surgery. No chest pain, palpitations, orthopnea, or  paroxysmal nocturnal dyspnea or edema. She has cough, wheeze, and  shortness of breath on O2 at home. No hemoptysis. She has nausea. No  vomiting. Positive for diarrhea. No constipation. No blood in the  stool. No urgency, frequency, dysuria, or hematuria. The endocrine  system is positive for hyperglycemia and negative for thyroid disease. Musculoskeletal system positive for degenerative joint and degenerative  disk disease. Skin is _____ of the forehead, otherwise negative. Psychiatric system positive for anxiety and depression. Neurologic  system negative for CVA, seizures, tremor, tics, or TIAs. PHYSICAL EXAMINATION:  GENERAL:  Exam shows this to be a 56-year-old white female, in moderate  distress with the above complaints. HEENT:  Head, eyes, ears, nose, and throat examination shows the head to  be normocephalic and atraumatic. Pupils are equal and reactive to light  and accommodation. Extraocular eye muscles are intact. Tympanic  membranes are clear. Ear canals are patent. Oral mucosa pink and  moist.  NECK:  Veins are flat and nondistended. No carotid bruits. CHEST:  Symmetrical.  HEART:  Had a regular rate and rhythm without murmur, gallops, or  friction rub. LUNGS:  Showed diminished breath sounds. Scattered rhonchi and rales  bilaterally. ABDOMEN:  Soft, nontender, and nondistended.   Bowel sounds are present  in all four quadrants. EXTERNAL GENITALIA:  Intact. MUSCULOSKELETAL:  Peripheral pulses intact. Legs without edema. BACK:  Spine shows physiologic curve. ASSESSMENT AND PLAN:  Initial impression at this time is COVID  pneumonia. The patient will be brought in and started on some steroids,  aerosols, zinc, vitamin D, vitamin C, and pulmonary consult. Further  orders will be written for her as her clinical course dictates. At the  time of admission, her condition is fair. Her prognosis is guarded.         Nellie Vegas DO    D: 05/14/2022 12:40:15       T: 05/14/2022 12:42:34     [email or web address]  Job#: [id number]     Doc#: [id number]    CC:

## 2022-05-15 PROCEDURE — 6360000002 HC RX W HCPCS: Performed by: GENERAL PRACTICE

## 2022-05-15 PROCEDURE — 1200000000 HC SEMI PRIVATE

## 2022-05-15 PROCEDURE — 2580000003 HC RX 258: Performed by: GENERAL PRACTICE

## 2022-05-15 PROCEDURE — 6370000000 HC RX 637 (ALT 250 FOR IP): Performed by: INTERNAL MEDICINE

## 2022-05-15 PROCEDURE — 2700000000 HC OXYGEN THERAPY PER DAY

## 2022-05-15 PROCEDURE — 6370000000 HC RX 637 (ALT 250 FOR IP): Performed by: GENERAL PRACTICE

## 2022-05-15 PROCEDURE — 6360000002 HC RX W HCPCS: Performed by: INTERNAL MEDICINE

## 2022-05-15 PROCEDURE — XW0DXM6 INTRODUCTION OF BARICITINIB INTO MOUTH AND PHARYNX, EXTERNAL APPROACH, NEW TECHNOLOGY GROUP 6: ICD-10-PCS | Performed by: GENERAL PRACTICE

## 2022-05-15 RX ORDER — DEXAMETHASONE SODIUM PHOSPHATE 10 MG/ML
6 INJECTION, SOLUTION INTRAMUSCULAR; INTRAVENOUS EVERY 12 HOURS
Status: DISCONTINUED | OUTPATIENT
Start: 2022-05-15 | End: 2022-05-17 | Stop reason: SDUPTHER

## 2022-05-15 RX ADMIN — TRIMETHOBENZAMIDE HYDROCHLORIDE 200 MG: 100 INJECTION INTRAMUSCULAR at 10:11

## 2022-05-15 RX ADMIN — ACETAMINOPHEN 650 MG: 325 TABLET ORAL at 09:12

## 2022-05-15 RX ADMIN — METOPROLOL TARTRATE 50 MG: 50 TABLET, FILM COATED ORAL at 09:11

## 2022-05-15 RX ADMIN — Medication 2000 UNITS: at 09:12

## 2022-05-15 RX ADMIN — SODIUM CHLORIDE: 9 INJECTION, SOLUTION INTRAVENOUS at 16:43

## 2022-05-15 RX ADMIN — DEXAMETHASONE SODIUM PHOSPHATE 10 MG: 10 INJECTION INTRAMUSCULAR; INTRAVENOUS at 09:12

## 2022-05-15 RX ADMIN — PANTOPRAZOLE SODIUM 40 MG: 40 TABLET, DELAYED RELEASE ORAL at 16:43

## 2022-05-15 RX ADMIN — FUROSEMIDE 20 MG: 20 TABLET ORAL at 09:12

## 2022-05-15 RX ADMIN — ASPIRIN 81 MG: 81 TABLET, COATED ORAL at 09:12

## 2022-05-15 RX ADMIN — OXYCODONE HYDROCHLORIDE AND ACETAMINOPHEN 500 MG: 500 TABLET ORAL at 09:12

## 2022-05-15 RX ADMIN — BUDESONIDE AND FORMOTEROL FUMARATE DIHYDRATE 2 PUFF: 160; 4.5 AEROSOL RESPIRATORY (INHALATION) at 23:33

## 2022-05-15 RX ADMIN — ENOXAPARIN SODIUM 30 MG: 100 INJECTION SUBCUTANEOUS at 23:31

## 2022-05-15 RX ADMIN — CLONAZEPAM 1 MG: 0.5 TABLET ORAL at 09:11

## 2022-05-15 RX ADMIN — CLONAZEPAM 1 MG: 0.5 TABLET ORAL at 16:43

## 2022-05-15 RX ADMIN — DEXAMETHASONE SODIUM PHOSPHATE 6 MG: 10 INJECTION, SOLUTION INTRAMUSCULAR; INTRAVENOUS at 23:32

## 2022-05-15 RX ADMIN — METOPROLOL TARTRATE 50 MG: 50 TABLET, FILM COATED ORAL at 23:32

## 2022-05-15 RX ADMIN — ALBUTEROL SULFATE 2 PUFF: 90 AEROSOL, METERED RESPIRATORY (INHALATION) at 13:14

## 2022-05-15 RX ADMIN — TRAMADOL HYDROCHLORIDE 50 MG: 50 TABLET, COATED ORAL at 09:12

## 2022-05-15 RX ADMIN — BARICITINIB 4 MG: 2 TABLET, FILM COATED ORAL at 16:43

## 2022-05-15 RX ADMIN — AZITHROMYCIN 250 MG: 250 TABLET, FILM COATED ORAL at 09:13

## 2022-05-15 RX ADMIN — PAROXETINE 30 MG: 10 TABLET, FILM COATED ORAL at 09:12

## 2022-05-15 RX ADMIN — LISINOPRIL 20 MG: 20 TABLET ORAL at 09:11

## 2022-05-15 RX ADMIN — ATORVASTATIN CALCIUM 20 MG: 20 TABLET, FILM COATED ORAL at 23:32

## 2022-05-15 RX ADMIN — TRAMADOL HYDROCHLORIDE 50 MG: 50 TABLET, COATED ORAL at 23:32

## 2022-05-15 RX ADMIN — ZINC SULFATE 220 MG (50 MG) CAPSULE 50 MG: CAPSULE at 09:11

## 2022-05-15 RX ADMIN — ENOXAPARIN SODIUM 30 MG: 100 INJECTION SUBCUTANEOUS at 09:12

## 2022-05-15 RX ADMIN — DOXEPIN HYDROCHLORIDE 100 MG: 50 CAPSULE ORAL at 23:34

## 2022-05-15 ASSESSMENT — PAIN DESCRIPTION - LOCATION: LOCATION: BACK;LEG

## 2022-05-15 ASSESSMENT — PAIN SCALES - GENERAL
PAINLEVEL_OUTOF10: 8
PAINLEVEL_OUTOF10: 8

## 2022-05-15 NOTE — PROGRESS NOTES
[facility]  [address] [facility], [address], [address] [facility]      Pulmonary Progress Note      SUBJECTIVE:  Claims stable/slightly worse with breathing, on 4 li NC, claims wheezing and coughing today slightly more than yesterday.     OBJECTIVE    Medications    Continuous Infusions:   sodium chloride 50 mL/hr at 22 8332       Scheduled Meds:   aspirin  81 mg Oral Daily    clonazePAM  1 mg Oral BID    doxepin  100 mg Oral Nightly    furosemide  20 mg Oral Daily    metoprolol tartrate  50 mg Oral BID    PARoxetine  30 mg Oral Daily    azithromycin  250 mg Oral Daily    ascorbic acid  500 mg Oral Daily    zinc sulfate  50 mg Oral Daily    vitamin D  2,000 Units Oral Daily    atorvastatin  20 mg Oral Nightly    dexamethasone  10 mg IntraVENous Daily    pantoprazole  40 mg Oral QPM    lisinopril  20 mg Oral Daily    budesonide-formoterol  2 puff Inhalation BID    enoxaparin  30 mg SubCUTAneous BID       PRN Meds:traMADol, acetaminophen, albuterol sulfate HFA, trimethobenzamide    Physical    VITALS:  BP (!) 158/69   Pulse 74   Temp 98 °F (36.7 °C) (Oral)   Resp 16   Ht 5' 4\" (1.626 m)   Wt 213 lb (96.6 kg)   SpO2 (!) 89%   BMI 36.56 kg/m²     24HR INTAKE/OUTPUT:    No intake or output data in the 24 hours ending 05/15/22 1400    24HR PULSE OXIMETRY RANGE:    SpO2  Av.3 %  Min: 89 %  Max: 94 %    General appearance: alert, appears stated age and cooperative, obese  Lungs: rhonchi bilaterally and wheezes bilaterally worse with cough  Heart: regular rate and rhythm, S1, S2 normal, no murmur, click, rub or gallop  Abdomen: soft, non-tender; bowel sounds normal; no masses,  no organomegaly  Extremities: extremities normal, atraumatic, no cyanosis or edema  Neurologic: Mental status: Alert, oriented, thought content appropriate    Data    CBC:   Recent Labs     22  1122 22  0159   WBC 4.3* 1.7*   HGB 13.6 12.7   HCT 40.9 38.9   MCV 91.1 92.0    237       BMP:  Recent Labs     05/13/22  1122 05/14/22  0159    139   K 3.3* 3.5   CL 96* 98   CO2 24 27   BUN 12 14   CREATININE 0.8 0.8    ALB:3,BILIDIR:3,BILITOT:3,ALKPHOS:3)@    PT/INR:   Recent Labs     05/13/22  1122   PROTIME 13.2*   INR 1.2       ABG:   No results for input(s): PH, PO2, PCO2, HCO3, BE, O2SAT, METHB, O2HB, COHB, O2CON, HHB, THB in the last 72 hours. Radiology/Other tests reviewed: none    Assessment:     Principal Problem:    Pneumonia due to COVID-19 virus  Resolved Problems:    * No resolved hospital problems. *      Plan:       1. CXR tomorrow  2. baricitinib daily and see if helps, follow inflammatory markers  3. Cont with MDI, observe cough/respiratory function  4. Encourage incentive spirometer use and prone positioning when able to  5. Cont with steroids, taper as tolerated      Time at the bedside, reviewing labs and radiographs, reviewing notes and consultations, discussing with staff and family was more than 35 minutes. Thanks for letting us see this patient in consultation. Please contact us with any questions. Office [phone] or after hours through [employer], x [phone].

## 2022-05-16 ENCOUNTER — APPOINTMENT (OUTPATIENT)
Dept: GENERAL RADIOLOGY | Age: 77
DRG: 137 | End: 2022-05-16
Payer: COMMERCIAL

## 2022-05-16 LAB
C-REACTIVE PROTEIN: 2.4 MG/DL (ref 0–0.4)
D DIMER: 432 NG/ML DDU

## 2022-05-16 PROCEDURE — 71045 X-RAY EXAM CHEST 1 VIEW: CPT

## 2022-05-16 PROCEDURE — 6370000000 HC RX 637 (ALT 250 FOR IP): Performed by: GENERAL PRACTICE

## 2022-05-16 PROCEDURE — 86140 C-REACTIVE PROTEIN: CPT

## 2022-05-16 PROCEDURE — 2700000000 HC OXYGEN THERAPY PER DAY

## 2022-05-16 PROCEDURE — 85378 FIBRIN DEGRADE SEMIQUANT: CPT

## 2022-05-16 PROCEDURE — 36415 COLL VENOUS BLD VENIPUNCTURE: CPT

## 2022-05-16 PROCEDURE — 6370000000 HC RX 637 (ALT 250 FOR IP): Performed by: INTERNAL MEDICINE

## 2022-05-16 PROCEDURE — 6360000002 HC RX W HCPCS: Performed by: GENERAL PRACTICE

## 2022-05-16 PROCEDURE — 2580000003 HC RX 258: Performed by: GENERAL PRACTICE

## 2022-05-16 PROCEDURE — 1200000000 HC SEMI PRIVATE

## 2022-05-16 PROCEDURE — 6360000002 HC RX W HCPCS: Performed by: INTERNAL MEDICINE

## 2022-05-16 RX ORDER — HYDRALAZINE HYDROCHLORIDE 20 MG/ML
10 INJECTION INTRAMUSCULAR; INTRAVENOUS ONCE
Status: COMPLETED | OUTPATIENT
Start: 2022-05-16 | End: 2022-05-16

## 2022-05-16 RX ADMIN — DEXAMETHASONE SODIUM PHOSPHATE 6 MG: 10 INJECTION, SOLUTION INTRAMUSCULAR; INTRAVENOUS at 08:27

## 2022-05-16 RX ADMIN — BUDESONIDE AND FORMOTEROL FUMARATE DIHYDRATE 2 PUFF: 160; 4.5 AEROSOL RESPIRATORY (INHALATION) at 08:26

## 2022-05-16 RX ADMIN — HYDRALAZINE HYDROCHLORIDE 10 MG: 20 INJECTION INTRAMUSCULAR; INTRAVENOUS at 23:19

## 2022-05-16 RX ADMIN — DOXEPIN HYDROCHLORIDE 100 MG: 50 CAPSULE ORAL at 22:41

## 2022-05-16 RX ADMIN — AZITHROMYCIN 250 MG: 250 TABLET, FILM COATED ORAL at 08:29

## 2022-05-16 RX ADMIN — Medication 2000 UNITS: at 08:29

## 2022-05-16 RX ADMIN — CLONAZEPAM 1 MG: 0.5 TABLET ORAL at 16:28

## 2022-05-16 RX ADMIN — ZINC SULFATE 220 MG (50 MG) CAPSULE 50 MG: CAPSULE at 08:29

## 2022-05-16 RX ADMIN — SODIUM CHLORIDE: 9 INJECTION, SOLUTION INTRAVENOUS at 13:04

## 2022-05-16 RX ADMIN — PANTOPRAZOLE SODIUM 40 MG: 40 TABLET, DELAYED RELEASE ORAL at 16:28

## 2022-05-16 RX ADMIN — DEXAMETHASONE SODIUM PHOSPHATE 6 MG: 10 INJECTION, SOLUTION INTRAMUSCULAR; INTRAVENOUS at 22:41

## 2022-05-16 RX ADMIN — PAROXETINE 30 MG: 10 TABLET, FILM COATED ORAL at 08:29

## 2022-05-16 RX ADMIN — ENOXAPARIN SODIUM 30 MG: 100 INJECTION SUBCUTANEOUS at 08:30

## 2022-05-16 RX ADMIN — CLONAZEPAM 1 MG: 0.5 TABLET ORAL at 08:29

## 2022-05-16 RX ADMIN — BUDESONIDE AND FORMOTEROL FUMARATE DIHYDRATE 2 PUFF: 160; 4.5 AEROSOL RESPIRATORY (INHALATION) at 22:43

## 2022-05-16 RX ADMIN — ENOXAPARIN SODIUM 30 MG: 100 INJECTION SUBCUTANEOUS at 22:41

## 2022-05-16 RX ADMIN — FUROSEMIDE 20 MG: 20 TABLET ORAL at 08:29

## 2022-05-16 RX ADMIN — ATORVASTATIN CALCIUM 20 MG: 20 TABLET, FILM COATED ORAL at 22:41

## 2022-05-16 RX ADMIN — LISINOPRIL 20 MG: 20 TABLET ORAL at 08:29

## 2022-05-16 RX ADMIN — OXYCODONE HYDROCHLORIDE AND ACETAMINOPHEN 500 MG: 500 TABLET ORAL at 08:29

## 2022-05-16 RX ADMIN — METOPROLOL TARTRATE 50 MG: 50 TABLET, FILM COATED ORAL at 08:29

## 2022-05-16 RX ADMIN — BARICITINIB 4 MG: 2 TABLET, FILM COATED ORAL at 08:29

## 2022-05-16 RX ADMIN — ASPIRIN 81 MG: 81 TABLET, COATED ORAL at 08:29

## 2022-05-16 ASSESSMENT — PAIN SCALES - GENERAL
PAINLEVEL_OUTOF10: 0
PAINLEVEL_OUTOF10: 0

## 2022-05-16 NOTE — PROGRESS NOTES
Pulmonary Progress Note    Admit Date: 2022  Hospital day                               PCP: Alicia Tapia DO    Chief Complaint (s):  Patient Active Problem List   Diagnosis    Aortic stenosis    [facility]ch[facility] obstructive [facility] disease) [facility]    Hypertension    H/O hyperlipidemia    Coronary artery disease involving native coronary artery of native heart without angina pectoris    Chest pain    Pneumonia due to COVID-19 virus       Subjective:  · Resting comfortably this p.m. Chest radiograph is reviewed. Despite worsening, oxygenation remains the same. Vitals:  VITALS:  BP (!) 156/72   Pulse 60   Temp 98.2 °F (36.8 °C) (Oral)   Resp 18   Ht 5' 4\" (1.626 m)   Wt 209 lb (94.8 kg)   SpO2 93%   BMI 35.87 kg/m²     24HR INTAKE/OUTPUT:      Intake/Output Summary (Last 24 hours) at 2022 1539  Last data filed at 2022 1334  Gross per 24 hour   Intake --   Output 200 ml   Net -200 ml       24HR PULSE OXIMETRY RANGE:    SpO2  Av.3 %  Min: 92 %  Max: 93 %    Medications:  IV:      Scheduled Meds:   dexamethasone  6 mg IntraVENous Q12H    baricitinib  4 mg Oral Daily    aspirin  81 mg Oral Daily    clonazePAM  1 mg Oral BID    doxepin  100 mg Oral Nightly    furosemide  20 mg Oral Daily    metoprolol tartrate  50 mg Oral BID    PARoxetine  30 mg Oral Daily    azithromycin  250 mg Oral Daily    ascorbic acid  500 mg Oral Daily    zinc sulfate  50 mg Oral Daily    vitamin D  2,000 Units Oral Daily    atorvastatin  20 mg Oral Nightly    pantoprazole  40 mg Oral QPM    lisinopril  20 mg Oral Daily    budesonide-formoterol  2 puff Inhalation BID    enoxaparin  30 mg SubCUTAneous BID       Diet:   ADULT DIET; Regular; Low Sodium (2 gm)     EXAM:  General: No distress. Asleep. Eyes: PERRL. No sclera icterus. No conjunctival injection. ENT: No discharge. Pharynx clear. Neck: Trachea midline. Normal thyroid. Resp: No accessory muscle use.   Bilateral rales.  No wheezing. No rhonchi. CV: Regular rate. Regular rhythm. No murmur or rub. Abd: Non-tender. Non-distended. No masses. No organomegaly. Normal bowel sounds. Skin: Warm and dry. No nodule on exposed extremities. No rash on exposed extremities. Ext: No cyanosis, clubbing, edema  Lymph: No cervical LAD. No supraclavicular LAD. M/S: No cyanosis. No joint deformity. No clubbing. Neuro: Resting comfortably. Positive pupils/gag/corneals. Normal pain response. Results:  CBC:   Recent Labs     05/14/22 0159   WBC 1.7*   HGB 12.7   HCT 38.9   MCV 92.0        BMP:   Recent Labs     05/14/22 0159      K 3.5   CL 98   CO2 27   BUN 14   CREATININE 0.8     LIVER PROFILE:   Recent Labs     05/14/22 0159   AST 19   ALT 11   BILITOT 0.7   ALKPHOS 58     PT/INR: No results for input(s): PROTIME, INR in the last 72 hours. APTT: No results for input(s): APTT in the last 72 hours. Pathology:  1. N/A      Microbiology:  1. None    Recent ABG:   No results for input(s): PH, PO2, PCO2, HCO3, BE, O2SAT, METHB, O2HB, COHB, O2CON, HHB, THB in the last 72 hours. Recent Films:  XR CHEST PORTABLE   Final Result   Worsening bilateral infiltrates which may be related to multifocal pneumonia. Asymmetric edema less likely. Continued follow-up recommended. CTA PULMONARY W CONTRAST   Final Result   Limited CTA chest due to motion artifact. No large occlusive pulmonary   emboli are identified on this examination. Bilateral predominantly peripheral and lower lobe ground-glass infiltrates   consistent with COVID pneumonitis. Cardiomegaly and atherosclerosis with evidence of previous sternotomy   procedure. Small hiatal hernia. XR CHEST PORTABLE   Final Result   1. Patchy right lower lobe airspace disease   2. Emphysematous changes             Assessment:  1. Worsening chest radiograph: Despite radiology's assertions, looks more like pulmonary edema.   There is worsening consolidation of the right lower lobe however    Plan:  1. Continue steroids and baricitinib and supplemental oxygen. Time at the bedside, reviewing labs and radiographs, reviewing updated notes and consultations, discussing with staff and family was more than 35 minutes. Please note that voice recognition technology was used in the preparation of this note and make therefore it may contain inadvertent transcription errors. If the patient is a COVID 19 isolation patient, the above physical exam reflects that of the examining physician for the day. Mario Steiner MD,  MYAIMA., F.C.C.P.     [facility] [address] [facility], [address] [place] [address], [address], [place], [place]

## 2022-05-16 NOTE — CARE COORDINATION
Spoke with pt by phone; states currently lives with daughter in law(son  2 weeks ago renal failure). Uses no assisitve devices; requests [address]. Ordered through [facility] ( no DME preference). Await PT/OT evals. will need to notify Lauri Swift at [facility] when discharged if home is the plan. Has home 02 3-4 liters via [employer] ( verified with Hollie Barrera). Will need portability at discharge. also agreeable to Calvin Gonzáles; (no preference) referral to [facility]. Will need orders. After pt/ot evaled. PCP dr igor morel.+covid; iv decadron/ Diego/po Zithromax. Plan at discharge TBD. Denies any SENTHIL history. Fatimah Ruiz.

## 2022-05-16 NOTE — ACP (ADVANCE CARE PLANNING)
Advance Care Planning     Advance Care Planning Activator (Inpatient)  Conversation Note      Date of ACP Conversation: 5/16/2022     Conversation Conducted with :patient    ACP Activator: Betty Vasques RN        Health Care Decision Maker:     Current Designated Health Care Decision Maker:     Primary Decision Maker: Keith Segura [facility]      Care Preferences    Ventilation: \"If you were in your present state of health and suddenly became very ill and were unable to breathe on your own, what would your preference be about the use of a ventilator (breathing machine) if it were available to you? \"      Would the patient desire the use of ventilator (breathing machine)?: yes    \"If your health worsens and it becomes clear that your chance of recovery is unlikely, what would your preference be about the use of a ventilator (breathing machine) if it were available to you? \"     Would the patient desire the use of ventilator (breathing machine)?: Yes      Resuscitation  \"CPR works best to restart the heart when there is a sudden event, like a heart attack, in someone who is otherwise healthy. Unfortunately, CPR does not typically restart the heart for people who have serious health conditions or who are very sick. \"    \"In the event your heart stopped as a result of an underlying serious health condition, would you want attempts to be made to restart your heart (answer \"yes\" for attempt to resuscitate) or would you prefer a natural death (answer \"no\" for do not attempt to resuscitate)? \" yes       [] Yes   [] No   Educated Patient / Rebbecca Branch regarding differences between Advance Directives and portable DNR orders.     Length of ACP Conversation in minutes:      Conversation Outcomes:  [] ACP discussion completed  [x] Existing advance directive reviewed with patient; no changes to patient's previously recorded wishes  [] New Advance Directive completed  [] Portable Do Not Rescitate prepared for Provider review and signature  [] POLST/POST/MOLST/MOST prepared for Provider review and signature      Follow-up plan:    [] Schedule follow-up conversation to continue planning  [x] Referred individual to Provider for additional questions/concerns   [] Advised patient/agent/surrogate to review completed ACP document and update if needed with changes in condition, patient preferences or care setting    [] This note routed to one or more involved healthcare providers    {

## 2022-05-16 NOTE — PROGRESS NOTES
Patient seen doing fair. Now on bid  Steroid  And barcitinib . Tolerating meds. 02 saturations okay on 02 supplementation. Labs stable. cxr a little worse with bilateral infiltrates. continue with crrenet treatment.  bp a little labile , will monitor

## 2022-05-16 NOTE — PROGRESS NOTES
IV fluids unable to infuse continuously at ordered rate (50 mL/hr) through current/only site (#22, distal LFA); Staff RN attempted to place new site, unsuccessful. Clinical notified; will attempt when available.

## 2022-05-17 PROCEDURE — 2700000000 HC OXYGEN THERAPY PER DAY

## 2022-05-17 PROCEDURE — 97535 SELF CARE MNGMENT TRAINING: CPT

## 2022-05-17 PROCEDURE — 6360000002 HC RX W HCPCS: Performed by: GENERAL PRACTICE

## 2022-05-17 PROCEDURE — 97530 THERAPEUTIC ACTIVITIES: CPT

## 2022-05-17 PROCEDURE — 6370000000 HC RX 637 (ALT 250 FOR IP): Performed by: INTERNAL MEDICINE

## 2022-05-17 PROCEDURE — 1200000000 HC SEMI PRIVATE

## 2022-05-17 PROCEDURE — 6370000000 HC RX 637 (ALT 250 FOR IP): Performed by: GENERAL PRACTICE

## 2022-05-17 PROCEDURE — 6360000002 HC RX W HCPCS: Performed by: INTERNAL MEDICINE

## 2022-05-17 PROCEDURE — 97161 PT EVAL LOW COMPLEX 20 MIN: CPT

## 2022-05-17 PROCEDURE — 97165 OT EVAL LOW COMPLEX 30 MIN: CPT

## 2022-05-17 RX ORDER — FUROSEMIDE 10 MG/ML
20 INJECTION INTRAMUSCULAR; INTRAVENOUS ONCE
Status: COMPLETED | OUTPATIENT
Start: 2022-05-17 | End: 2022-05-17

## 2022-05-17 RX ADMIN — Medication 2000 UNITS: at 09:30

## 2022-05-17 RX ADMIN — CLONAZEPAM 1 MG: 0.5 TABLET ORAL at 16:36

## 2022-05-17 RX ADMIN — AZITHROMYCIN 250 MG: 250 TABLET, FILM COATED ORAL at 09:30

## 2022-05-17 RX ADMIN — FUROSEMIDE 20 MG: 10 INJECTION, SOLUTION INTRAVENOUS at 09:39

## 2022-05-17 RX ADMIN — ASPIRIN 81 MG: 81 TABLET, COATED ORAL at 09:30

## 2022-05-17 RX ADMIN — DEXAMETHASONE 6 MG: 4 TABLET ORAL at 22:21

## 2022-05-17 RX ADMIN — DOXEPIN HYDROCHLORIDE 100 MG: 50 CAPSULE ORAL at 22:25

## 2022-05-17 RX ADMIN — OXYCODONE HYDROCHLORIDE AND ACETAMINOPHEN 500 MG: 500 TABLET ORAL at 09:29

## 2022-05-17 RX ADMIN — LISINOPRIL 20 MG: 20 TABLET ORAL at 09:30

## 2022-05-17 RX ADMIN — BUDESONIDE AND FORMOTEROL FUMARATE DIHYDRATE 2 PUFF: 160; 4.5 AEROSOL RESPIRATORY (INHALATION) at 09:30

## 2022-05-17 RX ADMIN — DEXAMETHASONE 6 MG: 4 TABLET ORAL at 09:29

## 2022-05-17 RX ADMIN — PAROXETINE 30 MG: 10 TABLET, FILM COATED ORAL at 09:29

## 2022-05-17 RX ADMIN — BARICITINIB 4 MG: 2 TABLET, FILM COATED ORAL at 09:30

## 2022-05-17 RX ADMIN — CLONAZEPAM 1 MG: 0.5 TABLET ORAL at 09:29

## 2022-05-17 RX ADMIN — METOPROLOL TARTRATE 50 MG: 50 TABLET, FILM COATED ORAL at 09:30

## 2022-05-17 RX ADMIN — ENOXAPARIN SODIUM 30 MG: 100 INJECTION SUBCUTANEOUS at 09:29

## 2022-05-17 RX ADMIN — ZINC SULFATE 220 MG (50 MG) CAPSULE 50 MG: CAPSULE at 09:30

## 2022-05-17 RX ADMIN — TRAMADOL HYDROCHLORIDE 50 MG: 50 TABLET, COATED ORAL at 09:29

## 2022-05-17 RX ADMIN — ATORVASTATIN CALCIUM 20 MG: 20 TABLET, FILM COATED ORAL at 22:21

## 2022-05-17 RX ADMIN — PANTOPRAZOLE SODIUM 40 MG: 40 TABLET, DELAYED RELEASE ORAL at 16:37

## 2022-05-17 RX ADMIN — BUDESONIDE AND FORMOTEROL FUMARATE DIHYDRATE 2 PUFF: 160; 4.5 AEROSOL RESPIRATORY (INHALATION) at 22:22

## 2022-05-17 RX ADMIN — ENOXAPARIN SODIUM 30 MG: 100 INJECTION SUBCUTANEOUS at 22:21

## 2022-05-17 RX ADMIN — FUROSEMIDE 20 MG: 20 TABLET ORAL at 09:30

## 2022-05-17 ASSESSMENT — PAIN DESCRIPTION - ORIENTATION: ORIENTATION: LOWER;UPPER

## 2022-05-17 ASSESSMENT — PAIN SCALES - GENERAL
PAINLEVEL_OUTOF10: 0
PAINLEVEL_OUTOF10: 7

## 2022-05-17 ASSESSMENT — PAIN DESCRIPTION - DESCRIPTORS: DESCRIPTORS: ACHING

## 2022-05-17 ASSESSMENT — PAIN DESCRIPTION - LOCATION: LOCATION: BACK

## 2022-05-17 NOTE — CARE COORDINATION
Spoke with daughter miguelina re; d/c planning; discussed [facility] score of 17/24; also liited facilities accepting covid pts. miguelina states she called [facility] and they advised that they are taking active covid pts. Left VM message for Xi to verify; also made referral to [facility] for CHB; await call back. Left VM message for [employer] to advise them of 02 concentrator issue at pt's residence; await call back. [facility]  Following. [employer] [facility] If she discharges home. Daughter miguelina aware of above. Await calls back and will further discuss when more information obtained. Ashely Campos states she has been using [employer] for portability for pt ( it was her father's who passed away 2 weeks ago) advises her to clear its use for pt through PCP as pt does not currently follow a pulmonologist. Will follow. Crystal Sutton. Spoke to Jamie Baird at [facility]; they are NOT accepting covid pts at this time. Discussed d/c plan with daughter miguelina; she now wishes to take pt home at discharge with mallorie Simpson 78. Sandee at [facility], who accepted pt; advised to cancel. miguelina states  issue with concentrator needs to be resolved before pt comes home. Angella Engle Spoke to Akhil Locke at [facility], who will have company call Ashely Campos, meet with her at pt's house to resolve issue. [facility] will also provide portable 02  For pt. Spoke to miguelina, advised her of above and informed her she will need to bring portable 02 tank to hospital when pt is cleared for discharge. She understands and agrees. will folllow. Crystal Sutton. Spoke with Akhil Locke at [facility]; arrangements have been made to equipment change at pt's residence. New 02 orders entered. Crystal Sutton.

## 2022-05-17 NOTE — PROGRESS NOTES
Notification of IV to PO conversion: This patient's order for dexamethasone IV has been changed to PO as approved by the Pharmacy & Therapeutics and Medical Executive Committees. If the patient should become strict NPO while on this therapy, contact the prescriber for further orders.   Neal Collins RPh 5/17/2022 8:34 AM

## 2022-05-17 NOTE — PROGRESS NOTES
Physical Therapy  Facility/Department: [address] MED SURG/TELE  Physical Therapy Initial Assessment    Name: Marlyn Vazquez  :   MRN: [id number]  Date of Service: 2022      Attending Provider:  Kimberly Yo DO    Evaluating PT:  Jairon Elliott P.T. Room #:  1529/8462-B  Diagnosis:  Unable to ambulate [R26.2]  Non-intractable vomiting with nausea, unspecified vomiting type [R11.2]  Pneumonia due to COVID-19 virus [U07.1, J12.82]  COVID-19 [U07.1]  Precautions:  Falls, bed/chair alarm, O2 sat drops with activity, Droplet +  Equipment Needs:  Wheeled walker    SUBJECTIVE:    Pt lives with her daughter-in-law (son passed away couple weeks ago) in a 2 story home with 6+6 stairs and 1 rail to enter. Her bed and bath are on the main floor. Pt ambulated with no AD PTA. OBJECTIVE:   Initial Evaluation  Date: 22 Treatment Short Term/ Long Term   Goals   Was pt agreeable to Eval/treatment? yes     Does pt have pain? 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 BLE strength is grossly 4-/5 to 4/5. Sensation:  Pt denies numbness and tingling to extremities  Edema:  None noted  Balance: sitting is supervision and standing with ww is SBA  Endurance: fair-    Vitals: Pt was on 4L O2 throughout PT session:    At rest O2 sat was 92% and HR 65 bpm  Sitting EOB initially pt had light headedness O2 sat was 87% and HR was 75 bpm, after 1 min light headedness subsided and O2 sat was 89% and HR 68 bpm.   After amb increased light headedness again and O2 sat was 85% and HR 79 bpm, after 2 min light headedness subsided and O2 sat was 90% and HR was 72 bpm.       Patient education  Pt educated on breathing technique with NC    Patient response to education:   Pt verbalized understanding Pt demonstrated skill Pt requires further education in this area   yes Inconsistent, required repeated VCs yes     ASSESSMENT:    Conditions Requiring Skilled Therapeutic Intervention:    [x]Decreased strength     []Decreased ROM  [x]Decreased functional mobility  [x]Decreased balance   [x]Decreased endurance   []Decreased posture  []Decreased sensation  []Decreased coordination   []Decreased vision  []Decreased safety awareness   []Increased pain     Comments:  Pt was found in bed on 4L O2 throughout PT session. She has decreased strength and endurance limiting her functional mobility and [place]. With light activity she c/o light headedness and O2 sat dropped. With rest and time O2 sat increased and light headedness subsided. Pt's amb distance was limited due to fatigue and she walked with very slow gait speed with ww. Pt is a risk for falling at this time. Treatment:  Patient practiced and was instructed in the following treatment:     Bed mobility, transfers, and gait with ww to improve functional strength and endurance.  Deep breathing technique in through the nose and out through the mouth to improve her respiratory function and O2 sat levels. Pt was left sitting up in chair with breakfast tray in front of her and call light left by patient. Chair/bed alarm: chair alarm was activated. Pt's/ family goals   1. To breathe better and go home. Patient and or family understand(s) diagnosis, prognosis, and plan of care.     PHYSICAL THERAPY PLAN OF CARE:    PT POC is established based on physician order and patient diagnosis     Referring provider/PT Order:  PT eval and treat  Diagnosis:  Unable to ambulate [R26.2]  Non-intractable vomiting with nausea, unspecified vomiting type [R11.2]  Pneumonia due to COVID-19 virus [U07.1, J12.82]  COVID-19 [U07.1]  Specific instructions for next treatment:  Increase amb distance as pt is able. Current Treatment Recommendations:     [x] Strengthening to improve independence with functional mobility   [] ROM to improve ROM and decrease spasm and pain which will help promote independence with functional mobility   [x] Balance Training to improve static/dynamic balance and to reduce fall risk  [x] Endurance Training to improve activity tolerance during functional mobility   [x] Transfer Training to improve safety and independence with all functional transfers   [x] Gait Training to improve gait mechanics, endurance and assess need for appropriate assistive device  [x] Stair Training in preparation for safe discharge home and/or into the community   [] Positioning to prevent skin breakdown and contractures  [] Safety and Education Training   [x] Patient/Caregiver Education   [] HEP  [] Other     PT long term treatment goals are located in above grid    Frequency of treatments: 2-5x/week x 1-2 weeks. Time in  07:35  Time out  08:00    Total Treatment Time  10 minutes     Evaluation Time includes thorough review of current medical information, gathering information on past medical history/social history and prior level of function, completion of standardized testing/informal observation of tasks, assessment of data and education on plan of care and goals. CPT codes:  [x] Low Complexity PT evaluation 94335  [] Moderate Complexity PT evaluation 64672  [] High Complexity PT evaluation 76089  [] PT Re-evaluation 47656  [] Gait training 70775 ** minutes  [] Manual therapy 44539 ** minutes  [x] Therapeutic activities 16294 10 minutes  [] Therapeutic exercises 93617 ** minutes  [] Neuromuscular reeducation 23810 ** minutes     Mumtaz Yuen., P.T.   License Number: [id number]

## 2022-05-17 NOTE — PROGRESS NOTES
Pulmonary Progress Note    Admit Date: 2022  Hospital day                               PCP: Alma Kincaid DO    Chief Complaint (s):  Patient Active Problem List   Diagnosis    Aortic stenosis    [facility]) [facility]    Hypertension    H/O hyperlipidemia    Coronary artery disease involving native coronary artery of native heart without angina pectoris    Chest pain    Pneumonia due to COVID-19 virus       Subjective:  · Sleeping soundly in the right lateral decubitus position this p.m. Lasix given, agree that this likely represents volume overload. Echocardiogram is pending. Steroids are no doubt aggravating volume retention. FiO2 remains about the same and saturation is quite good. Vitals:  VITALS:  BP (!) 168/74   Pulse 54   Temp 97.3 °F (36.3 °C) (Oral)   Resp 16   Ht 5' 4\" (1.626 m)   Wt 209 lb (94.8 kg)   SpO2 94%   BMI 35.87 kg/m²     24HR INTAKE/OUTPUT:      Intake/Output Summary (Last 24 hours) at 2022 1804  Last data filed at 2022 7983  Gross per 24 hour   Intake --   Output 600 ml   Net -600 ml       24HR PULSE OXIMETRY RANGE:    SpO2  Av.5 %  Min: 93 %  Max: 94 %    Medications:  IV:      Scheduled Meds:   dexamethasone  6 mg Oral 2 times per day    baricitinib  4 mg Oral Daily    aspirin  81 mg Oral Daily    clonazePAM  1 mg Oral BID    doxepin  100 mg Oral Nightly    furosemide  20 mg Oral Daily    metoprolol tartrate  50 mg Oral BID    PARoxetine  30 mg Oral Daily    ascorbic acid  500 mg Oral Daily    zinc sulfate  50 mg Oral Daily    vitamin D  2,000 Units Oral Daily    atorvastatin  20 mg Oral Nightly    pantoprazole  40 mg Oral QPM    lisinopril  20 mg Oral Daily    budesonide-formoterol  2 puff Inhalation BID    enoxaparin  30 mg SubCUTAneous BID       Diet:   ADULT DIET; Regular; Low Sodium (2 gm)     EXAM:  General: No distress. Asleep. Eyes: PERRL. No sclera icterus.  No conjunctival Emphysematous changes             Assessment:  1. Worsening chest radiograph: Despite radiology's assertions, looks more like pulmonary edema. There is worsening consolidation of the right lower lobe however    Plan:  1. Continue steroids and baricitinib and supplemental oxygen. 2. Wait to see if diuretics have an effect. Time at the bedside, reviewing labs and radiographs, reviewing updated notes and consultations, discussing with staff and family was more than 35 minutes. Please note that voice recognition technology was used in the preparation of this note and make therefore it may contain inadvertent transcription errors. If the patient is a COVID 19 isolation patient, the above physical exam reflects that of the examining physician for the day. Jennifer Aguirre MD,  M.D., F.C.C.P.     [facility], [address] [place] [address], [address] [place] [address]

## 2022-05-17 NOTE — PROGRESS NOTES
Patient seen again feels better. Agree with pulmonology  That  cxr resembles failure pattern. give a trial of lasix today. Hopefully echo will be read  Soon to help differentiate. bp a little labile continue to monitor. vss no fever.  Tolerating meds continue with current treatment

## 2022-05-17 NOTE — PROGRESS NOTES
Occupational Therapy  OCCUPATIONAL THERAPY INITIAL EVALUATION  [facility] [address]    Date: 2022     Patient Name: Audria Lundborg  MRN: [id number]  : 1945  Room: [address] [place] [address]    Evaluating OT: Claudetta Downing. Ronald Juarez, OTR/FERDINAND Broderick [id number]    Referring Provider: Tor Salcedo DO  Specific Provider Orders/Date: \"OT eval and treat\" - 2022    Diagnosis: Unable to ambulate [R26.2], Non-intractable vomiting with nausea, unspecified vomiting type [R11.2], Pneumonia due to COVID-19 virus [U07.1, J12.82], COVID-19 [U07.1]      Pertinent Medical History: COPD, CHF, HTN, TIA, CAD, anxiety and depression, arthritis, chronic back pain, hyperlipidemia, GERD    Precautions: fall risk, droplet plus isolation (COVID-19), O2 via nasal cannula, bed/chair alarms    Assessment of Current Deficits:    [x] Functional mobility   [x]ADLs  [x] Strength               [x]Cognition   [x] Functional transfers   [x] IADLs         [x] Safety Awareness   [x]Endurance   [] Fine Coordination              [x] Balance      [] Vision/perception   [x]Sensation    []Gross Motor Coordination  [] ROM  [] Delirium                   [] Motor Control     OT PLAN OF CARE   OT POC is based on physician orders, patient diagnosis, and results of clinical assessment.   Frequency/Duration 2-5 days/week for 2 weeks PRN   Specific OT Treatment Interventions to Include:   * Instruction/training on adapted ADL techniques and AE recommendations to increase functional independence within precautions       * Training on energy conservation strategies, correct breathing pattern and techniques to improve independence/tolerance for self-care routine  * Functional transfer/mobility training/DME recommendations for increased independence, safety, and fall prevention  * Patient/Family education to increase follow through with safety techniques and functional independence  * Recommendation of environmental modifications for increased safety with functional transfers/mobility and ADLs  * Therapeutic exercise to improve motor endurance, ROM, and functional strength for ADLs/functional transfers  * Therapeutic activities to facilitate/challenge dynamic balance, stand tolerance for increased safety and independence with ADLs  * Neuro-muscular re-education: facilitation of righting/equilibrium reactions, midline orientation, scapular stability/mobility, normalization of muscle tone, and facilitation of volitional active controled movement  * Positioning to improve skin integrity, interaction with environment and functional independence    Recommended Adaptive Equipment: TBD     Home Living: Patient lives with family in a one-floor setup (5 steps to enter home + 8 steps up from entryway to access the main living level). Bathroom Setup: tub shower (tub seat available, no grab bars) and standard-height toilet  Equipment Owned: [facility], home O2    Prior Level of Function (PLOF): Patient reported that she was mostly independent with ADLs; family members assist with tub shower transfers and LB ADLs, as needed. Family assists with most IADLs; patient indicated she had been independent with light meal prep tasks prior. Patient was independent with functional mobility (without device) prior to this hospitalization. Patient reported experiencing increased difficulty with ADLs and functional transfers/mobility immediately prior to this hospitalization. Driving: No    Pain Level: Patient reported experiencing pain in her back, knees, and LEs, but did not rate her pain. Cognition: Patient alert and oriented grossly. WFL command follow demonstrated. Slowed processing demonstrated. Fair insight to current abilities/limitations demonstrated. Patient is a questionable historian; no family/caregiver present to verify information gathered.   Memory: Fair  Sequencing: Fair  Problem Solving: Fair  Judgement/Safety: Fair    Functional Assessment:  AM-PAC Daily Activity Raw Score: 15/24   Initial Eval Status  Date: 5/17/2022 Treatment Status  Date:  Short Term Goals = Long Term Goals   Feeding SBA  Setup   Grooming Min A  Supervision  (seated/standing at sinkside)   UB Dressing Min A  Setup   LB Dressing Max A to adjust socks. Min A - with use of AE, as needed/appropriate   Bathing Max A  Min A - with use of AE/DME, as needed/appropriate   Toileting Mod A  Supervision   Bed Mobility  Supine-to-Sit: Min A  Supervision in order to maximize patient's independence with ADLs, re-positioning, and other functional tasks. Functional Transfers Sit-to-Stand: Min A   from EOB  Cues needed to maximize safety with functional transfers. Supervision   Functional Mobility Min A  (with walker) for few steps from EOB to [facility] and from [facility] to bedside chair. Increased time and assistance needed with management of walker. Supervision with functional mobility (with device, as needed/appropriate) in order to maximize independence with ADLs/IADLs and other functional tasks. Balance Sitting: Good  (at EOB and on BSC)  Standing: Fair-  (with walker)  Fair+ dynamic standing balance during completion of ADLs/IADLs and other functional tasks. Activity Tolerance Limited  Patient reported experiencing intermittent lightheadedness with OOB activities. Patient will demonstrate Good understanding and consistent implementation of energy conservation techniques and work simplification techniques into ADL/IADL routines. Visual/  Perceptual WFL grossly  N/A   B UE Strength 3+/5  Patient will demonstrate 4/5 B UE strength in order to maximize independence with ADLs, bed mobility, and functional transfers. Additional Long-Term Goal: Patient will increase functional independence to PLOF in order to allow patient to live in least restrictive environment.       Strength: ROM: Additional Information:    R UE  3+/5 WFL    L UE 3+/5  WFL      Hearing: information, gathering information on past medical history/social history and prior level of function, completion of standardized testing/informal observation of tasks, assessment of data, and education on plan of care and goals. Desire Thomas, OTR/L  License Number: [id number]

## 2022-05-17 NOTE — PROGRESS NOTES
Spoke to Dr. Nayely Edouard via phone, updated pt bp is running high 833'F systolic and hr is low. New orders received.

## 2022-05-18 ENCOUNTER — APPOINTMENT (OUTPATIENT)
Dept: GENERAL RADIOLOGY | Age: 77
DRG: 137 | End: 2022-05-18
Payer: COMMERCIAL

## 2022-05-18 PROCEDURE — 6360000002 HC RX W HCPCS: Performed by: INTERNAL MEDICINE

## 2022-05-18 PROCEDURE — 1200000000 HC SEMI PRIVATE

## 2022-05-18 PROCEDURE — 71045 X-RAY EXAM CHEST 1 VIEW: CPT

## 2022-05-18 PROCEDURE — 6360000002 HC RX W HCPCS: Performed by: GENERAL PRACTICE

## 2022-05-18 PROCEDURE — 2700000000 HC OXYGEN THERAPY PER DAY

## 2022-05-18 PROCEDURE — 6370000000 HC RX 637 (ALT 250 FOR IP): Performed by: INTERNAL MEDICINE

## 2022-05-18 PROCEDURE — 6370000000 HC RX 637 (ALT 250 FOR IP): Performed by: GENERAL PRACTICE

## 2022-05-18 RX ADMIN — OXYCODONE HYDROCHLORIDE AND ACETAMINOPHEN 500 MG: 500 TABLET ORAL at 08:31

## 2022-05-18 RX ADMIN — ZINC SULFATE 220 MG (50 MG) CAPSULE 50 MG: CAPSULE at 08:31

## 2022-05-18 RX ADMIN — FUROSEMIDE 20 MG: 20 TABLET ORAL at 08:32

## 2022-05-18 RX ADMIN — ENOXAPARIN SODIUM 30 MG: 100 INJECTION SUBCUTANEOUS at 21:17

## 2022-05-18 RX ADMIN — ACETAMINOPHEN 650 MG: 325 TABLET ORAL at 03:13

## 2022-05-18 RX ADMIN — TRAMADOL HYDROCHLORIDE 50 MG: 50 TABLET, COATED ORAL at 12:08

## 2022-05-18 RX ADMIN — CLONAZEPAM 1 MG: 0.5 TABLET ORAL at 08:32

## 2022-05-18 RX ADMIN — BARICITINIB 4 MG: 2 TABLET, FILM COATED ORAL at 08:31

## 2022-05-18 RX ADMIN — Medication 2000 UNITS: at 08:31

## 2022-05-18 RX ADMIN — PANTOPRAZOLE SODIUM 40 MG: 40 TABLET, DELAYED RELEASE ORAL at 17:08

## 2022-05-18 RX ADMIN — METOPROLOL TARTRATE 50 MG: 50 TABLET, FILM COATED ORAL at 22:32

## 2022-05-18 RX ADMIN — ATORVASTATIN CALCIUM 20 MG: 20 TABLET, FILM COATED ORAL at 21:17

## 2022-05-18 RX ADMIN — DEXAMETHASONE 6 MG: 4 TABLET ORAL at 08:31

## 2022-05-18 RX ADMIN — CLONAZEPAM 1 MG: 0.5 TABLET ORAL at 17:08

## 2022-05-18 RX ADMIN — LISINOPRIL 20 MG: 20 TABLET ORAL at 08:32

## 2022-05-18 RX ADMIN — METOPROLOL TARTRATE 50 MG: 50 TABLET, FILM COATED ORAL at 08:32

## 2022-05-18 RX ADMIN — DEXAMETHASONE 6 MG: 4 TABLET ORAL at 21:17

## 2022-05-18 RX ADMIN — ENOXAPARIN SODIUM 30 MG: 100 INJECTION SUBCUTANEOUS at 08:32

## 2022-05-18 RX ADMIN — ALBUTEROL SULFATE 2 PUFF: 90 AEROSOL, METERED RESPIRATORY (INHALATION) at 17:14

## 2022-05-18 RX ADMIN — PAROXETINE 30 MG: 10 TABLET, FILM COATED ORAL at 08:32

## 2022-05-18 RX ADMIN — BUDESONIDE AND FORMOTEROL FUMARATE DIHYDRATE 2 PUFF: 160; 4.5 AEROSOL RESPIRATORY (INHALATION) at 21:17

## 2022-05-18 RX ADMIN — ASPIRIN 81 MG: 81 TABLET, COATED ORAL at 08:31

## 2022-05-18 RX ADMIN — DOXEPIN HYDROCHLORIDE 100 MG: 50 CAPSULE ORAL at 21:17

## 2022-05-18 RX ADMIN — BUDESONIDE AND FORMOTEROL FUMARATE DIHYDRATE 2 PUFF: 160; 4.5 AEROSOL RESPIRATORY (INHALATION) at 08:33

## 2022-05-18 ASSESSMENT — PAIN SCALES - GENERAL
PAINLEVEL_OUTOF10: 8
PAINLEVEL_OUTOF10: 7
PAINLEVEL_OUTOF10: 0
PAINLEVEL_OUTOF10: 8
PAINLEVEL_OUTOF10: 7
PAINLEVEL_OUTOF10: 0

## 2022-05-18 ASSESSMENT — PAIN DESCRIPTION - LOCATION
LOCATION: BACK
LOCATION: BACK

## 2022-05-18 ASSESSMENT — PAIN DESCRIPTION - DESCRIPTORS
DESCRIPTORS: ACHING
DESCRIPTORS: ACHING

## 2022-05-18 ASSESSMENT — PAIN - FUNCTIONAL ASSESSMENT: PAIN_FUNCTIONAL_ASSESSMENT: PREVENTS OR INTERFERES SOME ACTIVE ACTIVITIES AND ADLS

## 2022-05-18 ASSESSMENT — PAIN DESCRIPTION - ORIENTATION: ORIENTATION: LOWER;UPPER

## 2022-05-18 NOTE — PLAN OF CARE
Problem: ABCDS Injury Assessment  Goal: Absence of physical injury  Outcome: Progressing  Flowsheets (Taken 5/18/2022 0054)  Absence of Physical Injury: Implement safety measures based on patient assessment

## 2022-05-18 NOTE — PROGRESS NOTES
Pulmonary Progress Note    Admit Date: 2022  Hospital day                               PCP: Marianela Murillo DO    Chief Complaint (s):  Patient Active Problem List   Diagnosis    Aortic stenosis    COPD (chronic obstructive pulmonary disease) ([facility]    Hypertension    H/O hyperlipidemia    Coronary artery disease involving native coronary artery of native heart without angina pectoris    Chest pain    Pneumonia due to COVID-19 virus       Subjective:  · Sitting up in a chair today, breathing pretty well. Hoping to go home soon. Vitals:  VITALS:  /77   Pulse 56   Temp 98.7 °F (37.1 °C) (Oral)   Resp 16   Ht 5' 4\" (1.626 m)   Wt 209 lb (94.8 kg)   SpO2 94%   BMI 35.87 kg/m²     24HR INTAKE/OUTPUT:    No intake or output data in the 24 hours ending 22 1427    24HR PULSE OXIMETRY RANGE:    SpO2  Av %  Min: 94 %  Max: 94 %    Medications:  IV:      Scheduled Meds:   dexamethasone  6 mg Oral 2 times per day    baricitinib  4 mg Oral Daily    aspirin  81 mg Oral Daily    clonazePAM  1 mg Oral BID    doxepin  100 mg Oral Nightly    furosemide  20 mg Oral Daily    metoprolol tartrate  50 mg Oral BID    PARoxetine  30 mg Oral Daily    ascorbic acid  500 mg Oral Daily    zinc sulfate  50 mg Oral Daily    vitamin D  2,000 Units Oral Daily    atorvastatin  20 mg Oral Nightly    pantoprazole  40 mg Oral QPM    lisinopril  20 mg Oral Daily    budesonide-formoterol  2 puff Inhalation BID    enoxaparin  30 mg SubCUTAneous BID       Diet:   ADULT DIET; Regular; Low Sodium (2 gm)     EXAM:  General: No distress. Awake and alert. Eyes: PERRL. No sclera icterus. No conjunctival injection. ENT: No discharge. Pharynx clear. Neck: Trachea midline. Normal thyroid. Resp: No accessory muscle use. Bilateral rales. No wheezing. No rhonchi. CV: Regular rate. Regular rhythm. No murmur or rub. Abd: Non-tender. Non-distended. No masses. No organomegaly.  Normal bowel sounds. Skin: Warm and dry. No nodule on exposed extremities. No rash on exposed extremities. Ext: No cyanosis, clubbing, edema  Lymph: No cervical LAD. No supraclavicular LAD. M/S: No cyanosis. No joint deformity. No clubbing. Neuro: Resting comfortably. Positive pupils/gag/corneals. Normal pain response. Results:  CBC:   No results for input(s): WBC, HGB, HCT, MCV, PLT in the last 72 hours. BMP:   No results for input(s): NA, K, CL, CO2, PHOS, BUN, CREATININE, CA in the last 72 hours. LIVER PROFILE:   No results for input(s): AST, ALT, LIPASE, BILIDIR, BILITOT, ALKPHOS in the last 72 hours. Invalid input(s): AMYLASE,  ALB  PT/INR: No results for input(s): PROTIME, INR in the last 72 hours. APTT: No results for input(s): APTT in the last 72 hours. Pathology:  1. N/A      Microbiology:  1. None    Recent ABG:   No results for input(s): PH, PO2, PCO2, HCO3, BE, O2SAT, METHB, O2HB, COHB, O2CON, HHB, THB in the last 72 hours. Recent Films:  XR CHEST PORTABLE   Final Result   Worsening bilateral infiltrates which may be related to multifocal pneumonia. Asymmetric edema less likely. Continued follow-up recommended. CTA PULMONARY W CONTRAST   Final Result   Limited CTA chest due to motion artifact. No large occlusive pulmonary   emboli are identified on this examination. Bilateral predominantly peripheral and lower lobe ground-glass infiltrates   consistent with COVID pneumonitis. Cardiomegaly and atherosclerosis with evidence of previous sternotomy   procedure. Small hiatal hernia. XR CHEST PORTABLE   Final Result   1. Patchy right lower lobe airspace disease   2. Emphysematous changes             Assessment:  1. Worsening chest radiograph: Despite radiology's assertions, looks more like pulmonary edema. There is worsening consolidation of the right lower lobe however    Plan:  1.  Continue steroids and baricitinib and supplemental oxygen. 2. Repeat chest radiograph    Time at the bedside, reviewing labs and radiographs, reviewing updated notes and consultations, discussing with staff and family was more than 35 minutes. Please note that voice recognition technology was used in the preparation of this note and make therefore it may contain inadvertent transcription errors. If the patient is a COVID 19 isolation patient, the above physical exam reflects that of the examining physician for the day. Tianna Recinos MD,  M.D., F.C.C.P.     [facility], [address] [place] [address], [address] [place] [address], [facility]

## 2022-05-18 NOTE — PLAN OF CARE
Problem: Discharge Planning  Goal: Discharge to home or other facility with appropriate resources  Outcome: Progressing     Problem: Pain  Goal: Verbalizes/displays adequate comfort level or baseline comfort level  Outcome: Progressing  Flowsheets (Taken 5/18/2022 0300 by Roma Rosas RN)  Verbalizes/displays adequate comfort level or baseline comfort level: Encourage patient to monitor pain and request assistance

## 2022-05-18 NOTE — CARE COORDINATION
Discussed plan of care with ; plan is for home with daughter [place]; [facility] following. [place] to resolve home 02 issues today with daughter. Plan for now is tentative d/c for am. Daughter [place] updated. plan is for pt to return to her residence in [place]. Miguelangel josé is planning to stay with pt at discharge. Requests BSC for pt. Ordered through [facility]. Miguelangel josé is aware discharge plan is for d/c in am. [facility]. mian at [facility] DME notified. Jason Quinteros.

## 2022-05-18 NOTE — PROGRESS NOTES
Patient seen feeks fair, stil on 4.5 liters of 02. Still coughing . Tolerating meds .  Continue antivirals and steroids

## 2022-05-19 VITALS
HEART RATE: 61 BPM | OXYGEN SATURATION: 94 % | RESPIRATION RATE: 18 BRPM | DIASTOLIC BLOOD PRESSURE: 67 MMHG | WEIGHT: 210 LBS | SYSTOLIC BLOOD PRESSURE: 140 MMHG | BODY MASS INDEX: 35.85 KG/M2 | HEIGHT: 64 IN | TEMPERATURE: 97.6 F

## 2022-05-19 PROCEDURE — 6360000002 HC RX W HCPCS: Performed by: INTERNAL MEDICINE

## 2022-05-19 PROCEDURE — 6360000002 HC RX W HCPCS: Performed by: GENERAL PRACTICE

## 2022-05-19 PROCEDURE — 6370000000 HC RX 637 (ALT 250 FOR IP): Performed by: INTERNAL MEDICINE

## 2022-05-19 PROCEDURE — 97530 THERAPEUTIC ACTIVITIES: CPT

## 2022-05-19 PROCEDURE — 97535 SELF CARE MNGMENT TRAINING: CPT

## 2022-05-19 PROCEDURE — 2700000000 HC OXYGEN THERAPY PER DAY

## 2022-05-19 PROCEDURE — 6370000000 HC RX 637 (ALT 250 FOR IP): Performed by: GENERAL PRACTICE

## 2022-05-19 RX ORDER — FUROSEMIDE 10 MG/ML
20 INJECTION INTRAMUSCULAR; INTRAVENOUS ONCE
Status: COMPLETED | OUTPATIENT
Start: 2022-05-19 | End: 2022-05-19

## 2022-05-19 RX ORDER — ASCORBIC ACID 500 MG
500 TABLET ORAL DAILY
Qty: 30 TABLET | Refills: 3 | Status: SHIPPED | OUTPATIENT
Start: 2022-05-20

## 2022-05-19 RX ORDER — ZINC SULFATE 50(220)MG
50 CAPSULE ORAL DAILY
Qty: 30 CAPSULE | Refills: 3 | COMMUNITY
Start: 2022-05-20

## 2022-05-19 RX ORDER — DEXAMETHASONE 6 MG/1
6 TABLET ORAL EVERY 12 HOURS SCHEDULED
Qty: 20 TABLET | Refills: 0 | Status: SHIPPED | OUTPATIENT
Start: 2022-05-19 | End: 2022-05-29

## 2022-05-19 RX ADMIN — ASPIRIN 81 MG: 81 TABLET, COATED ORAL at 08:36

## 2022-05-19 RX ADMIN — DEXAMETHASONE 6 MG: 4 TABLET ORAL at 08:37

## 2022-05-19 RX ADMIN — BARICITINIB 4 MG: 2 TABLET, FILM COATED ORAL at 08:38

## 2022-05-19 RX ADMIN — METOPROLOL TARTRATE 50 MG: 50 TABLET, FILM COATED ORAL at 08:36

## 2022-05-19 RX ADMIN — ACETAMINOPHEN 650 MG: 325 TABLET ORAL at 14:47

## 2022-05-19 RX ADMIN — BUDESONIDE AND FORMOTEROL FUMARATE DIHYDRATE 2 PUFF: 160; 4.5 AEROSOL RESPIRATORY (INHALATION) at 08:39

## 2022-05-19 RX ADMIN — CLONAZEPAM 1 MG: 0.5 TABLET ORAL at 08:36

## 2022-05-19 RX ADMIN — FUROSEMIDE 20 MG: 10 INJECTION, SOLUTION INTRAVENOUS at 14:48

## 2022-05-19 RX ADMIN — ZINC SULFATE 220 MG (50 MG) CAPSULE 50 MG: CAPSULE at 08:38

## 2022-05-19 RX ADMIN — OXYCODONE HYDROCHLORIDE AND ACETAMINOPHEN 500 MG: 500 TABLET ORAL at 08:37

## 2022-05-19 RX ADMIN — LISINOPRIL 20 MG: 20 TABLET ORAL at 08:36

## 2022-05-19 RX ADMIN — Medication 2000 UNITS: at 08:40

## 2022-05-19 RX ADMIN — ENOXAPARIN SODIUM 30 MG: 100 INJECTION SUBCUTANEOUS at 08:41

## 2022-05-19 RX ADMIN — PAROXETINE 30 MG: 10 TABLET, FILM COATED ORAL at 08:38

## 2022-05-19 RX ADMIN — TRAMADOL HYDROCHLORIDE 50 MG: 50 TABLET, COATED ORAL at 08:37

## 2022-05-19 RX ADMIN — FUROSEMIDE 20 MG: 20 TABLET ORAL at 08:37

## 2022-05-19 NOTE — PROGRESS NOTES
Per Louie Dietz CM, [facility] DME will deliver wheeled walker and bed side commode to patients residence tomorrow 5/20.

## 2022-05-19 NOTE — PROGRESS NOTES
Occupational Therapy  OT BEDSIDE TREATMENT NOTE      Date:2022  Patient Name: Lyndal Hodgkins  MRN: [id number]  : 1945  Room: [address] [place] [address]     Per OT Eval:  Desire De Oliveira, OTR/FERDINAND [id number]     Referring Provider: Inocente Vu DO  Specific Provider Orders/Date: \"OT eval and treat\" - 2022     Diagnosis: Unable to ambulate [R26.2], Non-intractable vomiting with nausea, unspecified vomiting type [R11.2], Pneumonia due to COVID-19 virus [U07.1, J12.82], COVID-19 [U07.1]       Pertinent Medical History: COPD, CHF, HTN, TIA, CAD, anxiety and depression, arthritis, chronic back pain, hyperlipidemia, GERD     Precautions: fall risk, droplet plus isolation (COVID-19), O2 via nasal cannula, bed/chair alarms     Assessment of Current Deficits:    [x]? Functional mobility             [x]?ADLs           [x]? Strength                  [x]? Cognition   [x]? Functional transfers           [x]? IADLs         [x]? Safety Awareness   [x]? Endurance   []? Fine Coordination              [x]? Balance      []? Vision/perception   [x]? Sensation     []? Gross Motor Coordination  []? ROM           []? Delirium                   []? Motor Control      OT PLAN OF CARE   OT POC is based on physician orders, patient diagnosis, and results of clinical assessment.   Frequency/Duration 2-5 days/week for 2 weeks PRN   Specific OT Treatment Interventions to Include:   * Instruction/training on adapted ADL techniques and AE recommendations to increase functional independence within precautions       * Training on energy conservation strategies, correct breathing pattern and techniques to improve independence/tolerance for self-care routine  * Functional transfer/mobility training/DME recommendations for increased independence, safety, and fall prevention  * Patient/Family education to increase follow through with safety techniques and functional independence  * Recommendation of environmental modifications for increased safety with functional transfers/mobility and ADLs  * Therapeutic exercise to improve motor endurance, ROM, and functional strength for ADLs/functional transfers  * Therapeutic activities to facilitate/challenge dynamic balance, stand tolerance for increased safety and independence with ADLs  * Neuro-muscular re-education: facilitation of righting/equilibrium reactions, midline orientation, scapular stability/mobility, normalization of muscle tone, and facilitation of volitional active controled movement  * Positioning to improve skin integrity, interaction with environment and functional independence     Recommended Adaptive Equipment: TBD      Home Living: Patient lives with family in a one-floor setup (5 steps to enter home + 8 steps up from entryway to access the main living level). Bathroom Setup: tub shower (tub seat available, no grab bars) and standard-height toilet  Equipment Owned: [facility], home O2     Prior Level of Function (PLOF): Patient reported that she was mostly independent with ADLs; family members assist with tub shower transfers and LB ADLs, as needed. Family assists with most IADLs; patient indicated she had been independent with light meal prep tasks prior. Patient was independent with functional mobility (without device) prior to this hospitalization. Patient reported experiencing increased difficulty with ADLs and functional transfers/mobility immediately prior to this hospitalization. Driving: No     Pain Level: Pt reported back pain but did not rate  Cognition: Pt alert, conversing and following commands.  Some forgetfulness noted.     Functional Assessment:                  AM-PAC Daily Activity Raw Score: 16/24    Initial Eval Status  Date: 5/17/2022 Treatment Status  Date: 5/19/22 Short Term Goals = Long Term Goals   Feeding SBA   Setup   Grooming Min A  SBA  To complete hand hygiene standing at sink  Occasional BUE support on sink Supervision  (seated/standing at sinkside)   UB Dressing Min A   Setup LB Dressing Max A to adjust socks.  Max A to don socks  Min A - with use of AE, as needed/appropriate   Bathing Max A   Min A - with use of AE/DME, as needed/appropriate   Toileting Mod A  Min A  For clothing management  SBA  For thorough pericare Supervision   Bed Mobility  Supine-to-Sit: Min A  Sit to supine: SBA Supervision in order to maximize patient's independence with ADLs, re-positioning, and other functional tasks. Functional Transfers Sit-to-Stand: Min A   from EOB  Cues needed to maximize safety with functional transfers. [address] [place] [address] with wheeled walker  Sit<>Stand from commode  Stand to sit to EOB Supervision   Functional Mobility Min A  (with walker) for few steps from EOB to [employer] and from [employer] to bedside chair. Increased time and assistance needed with management of walker. [address] [place] [address] with wheeled walker  From bathroom and short distance in room Supervision with functional mobility (with device, as needed/appropriate) in order to maximize independence with ADLs/IADLs and other functional tasks. Balance Sitting: Good  (at EOB and on BSC)  Standing: Fair-  (with walker) Sitting: Sup (EOB and commode)  Standing: SBA with wheeled walker  Fair+ dynamic standing balance during completion of ADLs/IADLs and other functional tasks. Activity Tolerance Limited  Patient reported experiencing intermittent lightheadedness with OOB activities.  Fair with light activity. Pt on 4.5L O2 and SpO2 remained in 90s during session. Patient will demonstrate Good understanding and consistent implementation of energy conservation techniques and work simplification techniques into ADL/IADL routines.    Visual/  Perceptual WFL grossly   N/A   B UE Strength 3+/5   Patient will demonstrate 4/5 B UE strength in order to maximize independence with ADLs, bed mobility, and functional transfers.      Additional Long-Term Goal: Patient will increase functional independence to PLOF in order to allow patient to live in least restrictive environment. Comments: RN approved patient's participation in activities. Upon arrival, patient walking to bathroom, stating she urgently had to have a bowel movement. At end of session, patient lying in bed with call light and phone within reach, alarm set, and all lines and tubes intact. Treatment: OT treatment provided this date included:    Instruction/training on safety and adapted techniques for completion of ADLs. Pt Max A to don socks while seated. Pt with education on technique but decreased functional reach noted. Pt Min A to manage underwear up and down B hips for toileting. SBA for thorough pericare following bowel movement seated on commode. L lateral lean noted. Pt SBA to stand at sink and wash hands. Increased time needed and BUE support on sink. Decreased standing tolerance and balance noted.   Instruction/training on safe functional mobility/transfer techniques. Pt CGA with wheeled walker to stand from commode with cues for use of grab bar and hand placement and safe technique. Pt CGA to walk from bathroom to bed with cues for safe wheeled walker management and navigation to maximize safety and independence with ADLs and functional tasks. Pt with cues for safe technique, hand placement and wheeled walker use while sitting to bed. Pt SBA to return to supine with increased time and cues for technique. Further skilled OT treatment indicated to increase patient's safety and independence with completion of ADL/IADL tasks in order to maximize patient's functional independence and quality of life. · Patient has made progress towards set goals. · Continue OT plan of care.     Time In: 1520  Time Out: 1545  Total Treatment Time: 25 minutes      Minutes Units   Therapeutic Ex 94125     Therapeutic Activities 91450 76 1   ADL/Self Care 16200 15 1   Orthotic Management [id number]     Neuro Re-Ed [id number]     Non-Billable Time  ---       Alm Ganser, OTR/FERDINAND  License Number: [id number]

## 2022-05-19 NOTE — PROGRESS NOTES
1511-Spoke to lety Pace for d/c    1515-Paged Dr Hameed Credit regarding d/c    1610-Paged Dr Hameed Credit again regarding d/c

## 2022-05-19 NOTE — CARE COORDINATION
Currently on 4lnc. pulm input noted; for repeat CXR today. Plan is home at d/c; daughter Bridger Model to stay with pt. [facility] to follow; paul on chart. [employer] for home 02 (arranged) daughter provided portable 02; to bring tank for transport at discharge. WW/BSC ordered, to be delivered to room. Will follow pulmonary plan. additional diuretic today. Claudine Guillen.

## 2022-05-19 NOTE — PROGRESS NOTES
Pulmonary Progress Note    Admit Date: 2022  Hospital day                               PCP: Sharon Mancilla DO    Chief Complaint (s):  Patient Active Problem List   Diagnosis    Aortic stenosis    COPD [facility]chronic obstructive pulmonary disease) [facility] [address] [facility]    Hypertension    H/O hyperlipidemia    Coronary artery disease involving native coronary artery of native heart without angina pectoris    Chest pain    Pneumonia due to COVID-19 virus       Subjective:  · Unavailable for exam this a.m., the patient is in the shower. Vitals:  VITALS:  BP (!) 140/67   Pulse 61   Temp 97.6 °F (36.4 °C) (Oral)   Resp 18   Ht 5' 4\" (1.626 m)   Wt 210 lb (95.3 kg)   SpO2 94%   BMI 36.05 kg/m²     24HR INTAKE/OUTPUT:    No intake or output data in the 24 hours ending 22 1346    24HR PULSE OXIMETRY RANGE:    SpO2  Av %  Min: 92 %  Max: 96 %    Medications:  IV:      Scheduled Meds:   dexamethasone  6 mg Oral 2 times per day    baricitinib  4 mg Oral Daily    aspirin  81 mg Oral Daily    clonazePAM  1 mg Oral BID    doxepin  100 mg Oral Nightly    furosemide  20 mg Oral Daily    metoprolol tartrate  50 mg Oral BID    PARoxetine  30 mg Oral Daily    ascorbic acid  500 mg Oral Daily    zinc sulfate  50 mg Oral Daily    vitamin D  2,000 Units Oral Daily    atorvastatin  20 mg Oral Nightly    pantoprazole  40 mg Oral QPM    lisinopril  20 mg Oral Daily    budesonide-formoterol  2 puff Inhalation BID    enoxaparin  30 mg SubCUTAneous BID       Diet:   ADULT DIET; Regular; Low Sodium (2 gm)     EXAM:  General: No distress. Awake and alert. Eyes: PERRL. No sclera icterus. No conjunctival injection. ENT: No discharge. Pharynx clear. Neck: Trachea midline. Normal thyroid. Resp: No accessory muscle use. Bilateral rales. No wheezing. No rhonchi. CV: Regular rate. Regular rhythm. No murmur or rub. Abd: Non-tender. Non-distended. No masses. No organomegaly. Normal bowel sounds. Skin: Warm and dry. No nodule on exposed extremities. No rash on exposed extremities. Ext: No cyanosis, clubbing, edema  Lymph: No cervical LAD. No supraclavicular LAD. M/S: No cyanosis. No joint deformity. No clubbing. Neuro: Resting comfortably. Positive pupils/gag/corneals. Normal pain response. Results:  CBC:   No results for input(s): WBC, HGB, HCT, MCV, PLT in the last 72 hours. BMP:   No results for input(s): NA, K, CL, CO2, PHOS, BUN, CREATININE, CA in the last 72 hours. LIVER PROFILE:   No results for input(s): AST, ALT, LIPASE, BILIDIR, BILITOT, ALKPHOS in the last 72 hours. Invalid input(s): AMYLASE,  ALB  PT/INR: No results for input(s): PROTIME, INR in the last 72 hours. APTT: No results for input(s): APTT in the last 72 hours. Pathology:  1. N/A      Microbiology:  1. None    Recent ABG:   No results for input(s): PH, PO2, PCO2, HCO3, BE, O2SAT, METHB, O2HB, COHB, O2CON, HHB, THB in the last 72 hours. Recent Films:  XR CHEST PORTABLE   Final Result   Improved aeration of both lungs compared to 05/16/2022. XR CHEST PORTABLE   Final Result   Worsening bilateral infiltrates which may be related to multifocal pneumonia. Asymmetric edema less likely. Continued follow-up recommended. CTA PULMONARY W CONTRAST   Final Result   Limited CTA chest due to motion artifact. No large occlusive pulmonary   emboli are identified on this examination. Bilateral predominantly peripheral and lower lobe ground-glass infiltrates   consistent with COVID pneumonitis. Cardiomegaly and atherosclerosis with evidence of previous sternotomy   procedure. Small hiatal hernia. XR CHEST PORTABLE   Final Result   1. Patchy right lower lobe airspace disease   2. Emphysematous changes               Assessment:  1. Modestly improved chest radiograph: Despite radiology's assertions, looks more like pulmonary edema.   There is worsening consolidation of the right lower lobe however    Plan:  1. Continue steroids and baricitinib and supplemental oxygen. 2. Additional diuretic    Time at the bedside, reviewing labs and radiographs, reviewing updated notes and consultations, discussing with staff and family was more than 35 minutes. Please note that voice recognition technology was used in the preparation of this note and make therefore it may contain inadvertent transcription errors. If the patient is a COVID 19 isolation patient, the above physical exam reflects that of the examining physician for the day. Seema Robertson MD,  M.D., F.C.C.P.     [facility], [address] [place] [address], [address] [place] [address]

## 2022-05-19 NOTE — CARE COORDINATION
[facility] notified of probable discharge today. Spoke with daughter miguelina; 02 has been coordinated re; portability and home concentrator. Ritesh Angel.

## 2022-05-20 ENCOUNTER — CARE COORDINATION (OUTPATIENT)
Dept: CASE MANAGEMENT | Age: 77
End: 2022-05-20

## 2022-05-20 NOTE — CARE COORDINATION
Jaime 45 Transitions Initial Follow Up Call    Call within 2 business days of discharge: Yes    Patient: [place] Patient : [id number]   MRN: [id number]  Reason for Admission: PNA d/t COVID  Discharge Date: 22 RARS: Readmission Risk Score: 7.9 ( )      Last Discharge [facility]       Complaint Diagnosis Description Type Department Provider    22 Positive For Covid-19 COVID-19 . .. ED to Hosp-Admission (Discharged) (ADMITTED) MIKAELA 5SB Jory , DO; Jonathonpatricia Felix Dombr. .. Transitions of Care Initial Call    Was this an external facility discharge? No Discharge Facility: [facility]    Challenges to be reviewed by the provider   Additional needs identified to be addressed with provider: No  none             Method of communication with provider : none    Advance Care Planning:   Does patient have an Advance Directive: reviewed and current. Care Transition Nurse contacted the family by telephone to perform post hospital discharge assessment. Verified name and  with family as identifiers. Provided introduction to self, and explanation of the CTN role. CTN reviewed discharge instructions, medical action plan and red flags with family who verbalized understanding. Family given an opportunity to ask questions and does not have any further questions or concerns at this time. Were discharge instructions available to patient? Yes. Reviewed appropriate site of care based on symptoms and resources available to patient including: PCP  Urgent care clinics  [facility]  When to call 911  Condition related references. The family agrees to contact the PCP office for questions related to their healthcare. Medication reconciliation was performed with family, who verbalizes understanding of administration of home medications. Advised obtaining a 90-day supply of all daily and as-needed medications. Was patient discharged with a pulse oximeter? no    CTN provided contact information.  Plan for follow-up call in 5-7 days based on severity of symptoms and risk factors. Plan for next call: symptom management-Has cough, shortness of breath and nausea improved?  follow up appointment-Did patient get scheduled for HFU appt with Dr. Jaden Woodruff (PCP)? medication management-Did patient obtain Zince Sulfate that was ordered on discharge? Non-face-to-face services provided:  Scheduled appointment with PCP-CTN confirmed [facility] patient DTR Elver Costello) that she will call and schedule HFU appt   Obtained and reviewed discharge summary and/or continuity of care documents  Communication with home health agencies or other community services the patient is currently using-Spoke with Svitlana at [facility] who advises patient is scheduled for [facility] tomorrow 5/21/22 and has SN/PT/OT services ordered. Education of patient/family/caregiver/guardian to support self-management-Discussed COPD/COVID zone tool and knowing when to seek medical attention. Assessment and support for treatment adherence and medication management-Advised of importance for patient to take Decadron as directed until completely finished. Care Transitions 24 Hour Call    Schedule Follow Up Appointment with PCP: Declined  Do you have a copy of your discharge instructions?: Yes  Do you have all of your prescriptions and are they filled?: No  Have you scheduled your follow up appointment?: No  Do you have support at home?: Child  Do you feel like you have everything you need to keep you well at home?: No  Care Transitions Interventions       Spoke with patient DTR Elver Costello) on HIPAA regarding hospital discharge/COVID follow up. Yousuf Alarcon states patient continues to have shortness of breath which is improving. Confirmed with Yousuf Alarcon that patient obtained home oxygen and is wearing 4 lpm continuous. Denies patient being a smoker. States patient has a an ongoing cough that is dry. Denies patient having any chest pain or chest discomfort.  Noted CT of chest obtained on admission showed the following below:     Limited CTA chest due to motion artifact.  No large occlusive pulmonary   emboli are identified on this examination.       Bilateral predominantly peripheral and lower lobe ground-glass infiltrates   consistent with COVID pneumonitis.       Cardiomegaly and atherosclerosis with evidence of previous sternotomy   procedure.       Small hiatal hernia. [facility] patient has nausea with no vomiting which is not new and takes Zofran at home. Lewis states she is unable to review complete list with this CTN as she does not have her list with her but advises patient obtained Vit C and Decadron ordered on discharge and will be picking up OTC Zinc Sulfate. Advised of importance for patient to take Decadron as directed until completely finished. Lewis advised patient meds are managed by her and DTR-in-law Reema Ross) whom patient is staying with. Lewis states [facility] is expected to deliver [facility] today between 10 am and 2 pm.     Discussed COPD/COVID zone tool and knowing when to seek medical attention. CTN spoke with Svitlana at [facility] who advises [facility] is scheduled for tomorrow 5/21/22 and has SN/PT/OT services ordered. Lewis states she will call and schedule HFU appt with Dr. Juan Luis Mi (PCP) and declines needing any assistance. CTN will hand off to  to follow up on HFU appt. Denies any other complaints or concerns at this time. CTN will continue to follow. Follow Up  No future appointments.     IMTIAZ Lawson

## 2022-05-20 NOTE — CARE COORDINATION
Request from Marilyn Mendez, SONG., please follow up to see if patient has hospital f/u appt      Patient's daughter scheduled appt for 5/25 /2 1:30pm    Jossie Lopez, [address] [place] [address] Coordination Transition

## 2022-06-01 ENCOUNTER — CARE COORDINATION (OUTPATIENT)
Dept: CASE MANAGEMENT | Age: 77
End: 2022-06-01

## 2022-06-01 NOTE — CARE COORDINATION
DTR Randal Rosenbaum) states PCP lowered dose of LOpressor to 25 mg twice daily from 50 mg three times daily d/t \"low heart rate\"  Do you have any questions related to your medications?: No  Do you currently have any active services?: Yes  Are you currently active with any services?: Home Health  Do you have any needs or concerns that I can assist you with?: No  Identified Barriers: Lack of Education  Care Transitions Interventions  No Identified Needs  Other Interventions:         Spoke with patient DTR Randal Ilsa) today 6/1/22 for final hospital discharge/COVID-19+ follow up call. [facility] patient's bath aide noted patient leaning towards left side yesterday while in shower as well while on toilet. [facility] nurse was called who suggested 9-1-1 be called. [facility] came but patient refused to go to hospital and admits symptoms have since resolved and denies any issues today. Denies patient having any shortness of breath, chest pain, chest discomfort, abdominal pain, nausea, vomiting, diarrhea, chills or fever. Confirmed with [facility] patient has finished with Decadron but continues on Vitamin C and Kathey Roll states patient continues wearing home oxygen at 4 lpm continuous. States oxygen saturation while on phone with this CTN is 98%. Confirmed with [facility] patient completed HFU appt with Dr. Katt Lerma (PCP) on 5/25/22. [facility] PCP lowered dose on Lopressor to 35 mg twice daily from 50 mg three times daily d/t \"low heart rate\". [facility] states patient HR while on phone with this CTN is 63. CTN reiterated PNA/COVID zone tools and knowing when to seek medical attention. Denies any needs or concerns at this time. CTN signing off for Care Transition. Follow Up  No future appointments.     IMTIAZ Briseno

## 2022-06-13 ENCOUNTER — TELEPHONE (OUTPATIENT)
Dept: ADMINISTRATIVE | Age: 77
End: 2022-06-13

## 2022-06-13 NOTE — TELEPHONE ENCOUNTER
Patient Appointment Form:      PCP: Dr Pravin Strange  Referring: Pravin Strange    Has the Patient:    Seen a Cardiologist? yes    date:4/27/2017  Physician:Dr Wyatt Gibson  location:[place]    Had a heart catheterization? no    Had heart surgery? no    Had a stress test or nuclear stress test? no    Had an echocardiogram? yes   date: 6/8/2017   fac[facility]    Had a vascular ultrasound? no    Had a 24/48 heart monitor or extended cardiac event monitor? no    Had recent blood work in the last 6 months? no    Had a pacemaker/ICD/ILR implant? no    Seen an Electrophysiologist? no        Will send records via: in [facility]  Date & time of appointment:  6/23/2022 12:00 Dr Wyatt Gibson

## 2022-06-23 ENCOUNTER — OFFICE VISIT (OUTPATIENT)
Dept: CARDIOLOGY CLINIC | Age: 77
End: 2022-06-23
Payer: COMMERCIAL

## 2022-06-23 VITALS
HEIGHT: 61 IN | WEIGHT: 211 LBS | RESPIRATION RATE: 14 BRPM | DIASTOLIC BLOOD PRESSURE: 66 MMHG | SYSTOLIC BLOOD PRESSURE: 162 MMHG | BODY MASS INDEX: 39.84 KG/M2 | HEART RATE: 68 BPM

## 2022-06-23 DIAGNOSIS — I10 HYPERTENSION, UNSPECIFIED TYPE: Chronic | ICD-10-CM

## 2022-06-23 DIAGNOSIS — I35.0 NONRHEUMATIC AORTIC VALVE STENOSIS: Primary | ICD-10-CM

## 2022-06-23 DIAGNOSIS — I25.10 CORONARY ARTERY DISEASE INVOLVING NATIVE CORONARY ARTERY OF NATIVE HEART WITHOUT ANGINA PECTORIS: ICD-10-CM

## 2022-06-23 PROBLEM — R07.9 CHEST PAIN: Status: RESOLVED | Noted: 2018-09-16 | Resolved: 2022-06-23

## 2022-06-23 PROBLEM — J12.82 PNEUMONIA DUE TO COVID-19 VIRUS: Status: RESOLVED | Noted: 2022-05-13 | Resolved: 2022-06-23

## 2022-06-23 PROBLEM — U07.1 PNEUMONIA DUE TO COVID-19 VIRUS: Status: RESOLVED | Noted: 2022-05-13 | Resolved: 2022-06-23

## 2022-06-23 PROCEDURE — G8417 CALC BMI ABV UP PARAM F/U: HCPCS | Performed by: INTERNAL MEDICINE

## 2022-06-23 PROCEDURE — 99204 OFFICE O/P NEW MOD 45 MIN: CPT | Performed by: INTERNAL MEDICINE

## 2022-06-23 PROCEDURE — 1090F PRES/ABSN URINE INCON ASSESS: CPT | Performed by: INTERNAL MEDICINE

## 2022-06-23 PROCEDURE — 93000 ELECTROCARDIOGRAM COMPLETE: CPT | Performed by: INTERNAL MEDICINE

## 2022-06-23 PROCEDURE — 1123F ACP DISCUSS/DSCN MKR DOCD: CPT | Performed by: INTERNAL MEDICINE

## 2022-06-23 PROCEDURE — G8427 DOCREV CUR MEDS BY ELIG CLIN: HCPCS | Performed by: INTERNAL MEDICINE

## 2022-06-23 PROCEDURE — 1036F TOBACCO NON-USER: CPT | Performed by: INTERNAL MEDICINE

## 2022-06-23 PROCEDURE — G8400 PT W/DXA NO RESULTS DOC: HCPCS | Performed by: INTERNAL MEDICINE

## 2022-07-12 NOTE — DISCHARGE SUMMARY
[address] [address]                               DISCHARGE SUMMARY    PATIENT NAME: Nader Camacho                     :        1945  MED REC NO:   [id number]                            ROOM:       [id number]  ACCOUNT NO:   [de-identified]                           ADMIT DATE: 2022  PROVIDER:     Juanito Page DO                  [facility] DATE: 2022    FINAL DIAGNOSES:  1.  COVID-19 pneumonia. 2.  Inability to ambulate secondary to generalized weakness. 3.  COPD. 4.  Coronary artery disease. 5.  Chronic myofascial pain syndrome. 6.  Chronic anxiety. 7.  Depression. 8.  Hypertension. HISTORY AND HOSPITAL COURSE:  The patient is a 78-year-old white female  presented to emergency room after knowingly tested positive for COVID. Two days prior to admission, she was coughing, wheezing, short of  breath, nausea, vomiting, weak, unable to get out of bed, and could not  ambulate, on chronic oxygen therapy at home. She was nauseous. She was  vomiting. She had no appetite. She came in and a CT of the chest did  show bilateral ground-glass infiltrates and opacities. She required 4  liters of oxygen to saturate to 95%. She was subsequently admitted. She was started on IV steroids and baricitinib. Pulmonary had seen her. They put her on some aerosols, also vitamin D, zinc, and vitamin C. No  evidence of pulmonary emboli was noted. Inflammatory markers were  followed and seemed to be okay. She did make progress and was able to  be weaned off of the IV steroids and finished up with antivirals and  subsequently well enough to be sent home on Decadron 6 mg p.o. b.i.d.  along with all of her other longstanding medications including tramadol,  aspirin, clonazepam, Sinequan, Zocor, metoprolol, Lotensin, Lasix, and  Zocor.   Upon discharge, we will follow her up in the office and make any  further recommendations as necessary. She may need follow-up CT chest  or followup x-ray at least.  At the time of discharge, her condition was  improved and her long-term prognosis is improved.         Clementina Santiago DO    D: 07/11/2022 16:23:49       T: 07/11/2022 16:26:17     [email or web address]  Job#: [id number]     Doc#: [id number]    CC:

## 2023-04-09 ENCOUNTER — APPOINTMENT (OUTPATIENT)
Dept: CT IMAGING | Age: 78
DRG: 046 | End: 2023-04-09
Payer: COMMERCIAL

## 2023-04-09 ENCOUNTER — HOSPITAL ENCOUNTER (INPATIENT)
Age: 78
LOS: 8 days | Discharge: NURSING, REHAB OR LONG-TERM CARE | DRG: 046 | End: 2023-04-17
Attending: EMERGENCY MEDICINE | Admitting: INTERNAL MEDICINE
Payer: COMMERCIAL

## 2023-04-09 ENCOUNTER — APPOINTMENT (OUTPATIENT)
Dept: GENERAL RADIOLOGY | Age: 78
DRG: 046 | End: 2023-04-09
Payer: COMMERCIAL

## 2023-04-09 DIAGNOSIS — I63.9 CEREBROVASCULAR ACCIDENT (CVA), UNSPECIFIED MECHANISM (HCC): Primary | ICD-10-CM

## 2023-04-09 DIAGNOSIS — R29.810 FACIAL DROOP: ICD-10-CM

## 2023-04-09 DIAGNOSIS — I63.9 ACUTE CVA (CEREBROVASCULAR ACCIDENT) (HCC): ICD-10-CM

## 2023-04-09 DIAGNOSIS — R47.1 DYSARTHRIA: ICD-10-CM

## 2023-04-09 LAB
ALBUMIN SERPL-MCNC: 3.7 G/DL (ref 3.5–5.2)
ALP SERPL-CCNC: 85 U/L (ref 35–104)
ALT SERPL-CCNC: 6 U/L (ref 0–32)
AMORPH SED URNS QL MICRO: ABNORMAL
AMPHET UR QL SCN: NOT DETECTED
ANION GAP SERPL CALCULATED.3IONS-SCNC: 8 MMOL/L (ref 7–16)
APAP SERPL-MCNC: <5 MCG/ML (ref 10–30)
APTT BLD: 46.8 SEC (ref 24.5–35.1)
AST SERPL-CCNC: 12 U/L (ref 0–31)
BACTERIA URNS QL MICRO: ABNORMAL /HPF
BARBITURATES UR QL SCN: NOT DETECTED
BASOPHILS # BLD: 0.07 E9/L (ref 0–0.2)
BASOPHILS NFR BLD: 0.9 % (ref 0–2)
BENZODIAZ UR QL SCN: NOT DETECTED
BILIRUB DIRECT SERPL-MCNC: <0.2 MG/DL (ref 0–0.3)
BILIRUB INDIRECT SERPL-MCNC: ABNORMAL MG/DL (ref 0–1)
BILIRUB SERPL-MCNC: 0.4 MG/DL (ref 0–1.2)
BILIRUB UR QL STRIP: NEGATIVE
BNP BLD-MCNC: 94 PG/ML (ref 0–450)
BUN SERPL-MCNC: 18 MG/DL (ref 6–23)
CALCIUM SERPL-MCNC: 9.1 MG/DL (ref 8.6–10.2)
CANNABINOIDS UR QL SCN: NOT DETECTED
CHLORIDE SERPL-SCNC: 100 MMOL/L (ref 98–107)
CLARITY UR: CLEAR
CO2 SERPL-SCNC: 29 MMOL/L (ref 22–29)
COCAINE UR QL SCN: NOT DETECTED
COLOR UR: YELLOW
CREAT SERPL-MCNC: 1.3 MG/DL (ref 0.5–1)
DRUG SCREEN COMMENT UR-IMP: NORMAL
EOSINOPHIL # BLD: 0.13 E9/L (ref 0.05–0.5)
EOSINOPHIL NFR BLD: 1.6 % (ref 0–6)
ERYTHROCYTE [DISTWIDTH] IN BLOOD BY AUTOMATED COUNT: 11.9 FL (ref 11.5–15)
ETHANOLAMINE SERPL-MCNC: <10 MG/DL (ref 0–0.08)
FENTANYL SCREEN, URINE: NOT DETECTED
GLUCOSE SERPL-MCNC: 166 MG/DL (ref 74–99)
GLUCOSE UR STRIP-MCNC: NEGATIVE MG/DL
HCT VFR BLD AUTO: 42.1 % (ref 34–48)
HGB BLD-MCNC: 13.1 G/DL (ref 11.5–15.5)
HGB UR QL STRIP: NEGATIVE
IMM GRANULOCYTES # BLD: 0.03 E9/L
IMM GRANULOCYTES NFR BLD: 0.4 % (ref 0–5)
INR BLD: 1.2
KETONES UR STRIP-MCNC: NEGATIVE MG/DL
LEUKOCYTE ESTERASE UR QL STRIP: ABNORMAL
LYMPHOCYTES # BLD: 1.42 E9/L (ref 1.5–4)
LYMPHOCYTES NFR BLD: 17.4 % (ref 20–42)
MCH RBC QN AUTO: 30.3 PG (ref 26–35)
MCHC RBC AUTO-ENTMCNC: 31.1 % (ref 32–34.5)
MCV RBC AUTO: 97.2 FL (ref 80–99.9)
METHADONE UR QL SCN: NOT DETECTED
MONOCYTES # BLD: 0.51 E9/L (ref 0.1–0.95)
MONOCYTES NFR BLD: 6.3 % (ref 2–12)
NEUTROPHILS # BLD: 5.99 E9/L (ref 1.8–7.3)
NEUTS SEG NFR BLD: 73.4 % (ref 43–80)
NITRITE UR QL STRIP: NEGATIVE
OPIATES UR QL SCN: NOT DETECTED
OXYCODONE URINE: NOT DETECTED
PCP UR QL SCN: NOT DETECTED
PH UR STRIP: 5.5 [PH] (ref 5–9)
PLATELET # BLD AUTO: 325 E9/L (ref 130–450)
PMV BLD AUTO: 10.2 FL (ref 7–12)
POTASSIUM SERPL-SCNC: 3.8 MMOL/L (ref 3.5–5)
PROT SERPL-MCNC: 6 G/DL (ref 6.4–8.3)
PROT UR STRIP-MCNC: NEGATIVE MG/DL
PROTHROMBIN TIME: 12.7 SEC (ref 9.3–12.4)
RBC # BLD AUTO: 4.33 E12/L (ref 3.5–5.5)
RBC #/AREA URNS HPF: ABNORMAL /HPF (ref 0–2)
SALICYLATES SERPL-MCNC: <0.3 MG/DL (ref 0–30)
SODIUM SERPL-SCNC: 137 MMOL/L (ref 132–146)
SP GR UR STRIP: 1.01 (ref 1–1.03)
TRICYCLIC ANTIDEPRESSANTS SCREEN SERUM: NEGATIVE NG/ML
TROPONIN, HIGH SENSITIVITY: 16 NG/L (ref 0–9)
TROPONIN, HIGH SENSITIVITY: 17 NG/L (ref 0–9)
UROBILINOGEN UR STRIP-ACNC: 0.2 E.U./DL
WBC # BLD: 8.2 E9/L (ref 4.5–11.5)
WBC #/AREA URNS HPF: ABNORMAL /HPF (ref 0–5)

## 2023-04-09 PROCEDURE — 85730 THROMBOPLASTIN TIME PARTIAL: CPT

## 2023-04-09 PROCEDURE — 85025 COMPLETE CBC W/AUTO DIFF WBC: CPT

## 2023-04-09 PROCEDURE — 82077 ASSAY SPEC XCP UR&BREATH IA: CPT

## 2023-04-09 PROCEDURE — 96374 THER/PROPH/DIAG INJ IV PUSH: CPT

## 2023-04-09 PROCEDURE — 93005 ELECTROCARDIOGRAM TRACING: CPT | Performed by: STUDENT IN AN ORGANIZED HEALTH CARE EDUCATION/TRAINING PROGRAM

## 2023-04-09 PROCEDURE — 94640 AIRWAY INHALATION TREATMENT: CPT

## 2023-04-09 PROCEDURE — 2060000000 HC ICU INTERMEDIATE R&B

## 2023-04-09 PROCEDURE — 80143 DRUG ASSAY ACETAMINOPHEN: CPT

## 2023-04-09 PROCEDURE — 6360000002 HC RX W HCPCS: Performed by: STUDENT IN AN ORGANIZED HEALTH CARE EDUCATION/TRAINING PROGRAM

## 2023-04-09 PROCEDURE — 80179 DRUG ASSAY SALICYLATE: CPT

## 2023-04-09 PROCEDURE — 83880 ASSAY OF NATRIURETIC PEPTIDE: CPT

## 2023-04-09 PROCEDURE — 80076 HEPATIC FUNCTION PANEL: CPT

## 2023-04-09 PROCEDURE — 81001 URINALYSIS AUTO W/SCOPE: CPT

## 2023-04-09 PROCEDURE — 85610 PROTHROMBIN TIME: CPT

## 2023-04-09 PROCEDURE — 70450 CT HEAD/BRAIN W/O DYE: CPT

## 2023-04-09 PROCEDURE — 80307 DRUG TEST PRSMV CHEM ANLYZR: CPT

## 2023-04-09 PROCEDURE — 71045 X-RAY EXAM CHEST 1 VIEW: CPT

## 2023-04-09 PROCEDURE — 80048 BASIC METABOLIC PNL TOTAL CA: CPT

## 2023-04-09 PROCEDURE — 99285 EMERGENCY DEPT VISIT HI MDM: CPT

## 2023-04-09 PROCEDURE — 6360000002 HC RX W HCPCS: Performed by: INTERNAL MEDICINE

## 2023-04-09 PROCEDURE — 36415 COLL VENOUS BLD VENIPUNCTURE: CPT

## 2023-04-09 PROCEDURE — 6370000000 HC RX 637 (ALT 250 FOR IP): Performed by: INTERNAL MEDICINE

## 2023-04-09 PROCEDURE — 84484 ASSAY OF TROPONIN QUANT: CPT

## 2023-04-09 RX ORDER — ASPIRIN 81 MG/1
81 TABLET ORAL DAILY
Status: DISCONTINUED | OUTPATIENT
Start: 2023-04-09 | End: 2023-04-17 | Stop reason: HOSPADM

## 2023-04-09 RX ORDER — ACETAMINOPHEN 325 MG/1
650 TABLET ORAL ONCE
Status: DISCONTINUED | OUTPATIENT
Start: 2023-04-09 | End: 2023-04-17 | Stop reason: HOSPADM

## 2023-04-09 RX ORDER — BUDESONIDE 0.5 MG/2ML
1000 INHALANT ORAL 2 TIMES DAILY
Status: DISCONTINUED | OUTPATIENT
Start: 2023-04-09 | End: 2023-04-17 | Stop reason: HOSPADM

## 2023-04-09 RX ORDER — METOPROLOL TARTRATE 50 MG/1
50 TABLET, FILM COATED ORAL 3 TIMES DAILY
Status: DISCONTINUED | OUTPATIENT
Start: 2023-04-09 | End: 2023-04-11

## 2023-04-09 RX ORDER — PAROXETINE 10 MG/1
30 TABLET, FILM COATED ORAL DAILY
Status: DISCONTINUED | OUTPATIENT
Start: 2023-04-09 | End: 2023-04-17 | Stop reason: HOSPADM

## 2023-04-09 RX ORDER — PANTOPRAZOLE SODIUM 40 MG/1
40 TABLET, DELAYED RELEASE ORAL
Status: DISCONTINUED | OUTPATIENT
Start: 2023-04-10 | End: 2023-04-17 | Stop reason: HOSPADM

## 2023-04-09 RX ORDER — LANOLIN ALCOHOL/MO/W.PET/CERES
3 CREAM (GRAM) TOPICAL NIGHTLY PRN
Status: DISCONTINUED | OUTPATIENT
Start: 2023-04-09 | End: 2023-04-17 | Stop reason: HOSPADM

## 2023-04-09 RX ORDER — FUROSEMIDE 20 MG/1
20 TABLET ORAL DAILY
Status: DISCONTINUED | OUTPATIENT
Start: 2023-04-10 | End: 2023-04-17 | Stop reason: HOSPADM

## 2023-04-09 RX ORDER — CLONAZEPAM 0.5 MG/1
1 TABLET ORAL 2 TIMES DAILY
Status: DISCONTINUED | OUTPATIENT
Start: 2023-04-09 | End: 2023-04-17 | Stop reason: HOSPADM

## 2023-04-09 RX ORDER — LISINOPRIL 10 MG/1
20 TABLET ORAL DAILY
Status: DISCONTINUED | OUTPATIENT
Start: 2023-04-09 | End: 2023-04-17 | Stop reason: HOSPADM

## 2023-04-09 RX ORDER — FENTANYL CITRATE 50 UG/ML
50 INJECTION, SOLUTION INTRAMUSCULAR; INTRAVENOUS ONCE
Status: COMPLETED | OUTPATIENT
Start: 2023-04-09 | End: 2023-04-09

## 2023-04-09 RX ORDER — DOXEPIN HYDROCHLORIDE 50 MG/1
100 CAPSULE ORAL NIGHTLY
Status: DISCONTINUED | OUTPATIENT
Start: 2023-04-09 | End: 2023-04-17 | Stop reason: HOSPADM

## 2023-04-09 RX ORDER — ATORVASTATIN CALCIUM 40 MG/1
40 TABLET, FILM COATED ORAL DAILY
Status: DISCONTINUED | OUTPATIENT
Start: 2023-04-09 | End: 2023-04-17 | Stop reason: HOSPADM

## 2023-04-09 RX ORDER — TRAMADOL HYDROCHLORIDE 50 MG/1
50 TABLET ORAL EVERY 8 HOURS PRN
Status: DISCONTINUED | OUTPATIENT
Start: 2023-04-09 | End: 2023-04-16

## 2023-04-09 RX ORDER — ALBUTEROL SULFATE 2.5 MG/3ML
2.5 SOLUTION RESPIRATORY (INHALATION) EVERY 4 HOURS PRN
Status: DISCONTINUED | OUTPATIENT
Start: 2023-04-09 | End: 2023-04-17 | Stop reason: HOSPADM

## 2023-04-09 RX ADMIN — FENTANYL CITRATE 50 MCG: 50 INJECTION INTRAMUSCULAR; INTRAVENOUS at 17:52

## 2023-04-09 RX ADMIN — ATORVASTATIN CALCIUM 40 MG: 40 TABLET, FILM COATED ORAL at 22:40

## 2023-04-09 RX ADMIN — TRAMADOL HYDROCHLORIDE 50 MG: 50 TABLET, COATED ORAL at 22:41

## 2023-04-09 RX ADMIN — METOPROLOL TARTRATE 50 MG: 50 TABLET ORAL at 22:40

## 2023-04-09 RX ADMIN — LISINOPRIL 20 MG: 10 TABLET ORAL at 22:40

## 2023-04-09 RX ADMIN — BUDESONIDE 1000 MCG: 0.5 INHALANT RESPIRATORY (INHALATION) at 21:32

## 2023-04-09 RX ADMIN — ASPIRIN 81 MG: 81 TABLET, COATED ORAL at 22:41

## 2023-04-09 RX ADMIN — CLONAZEPAM 1 MG: 0.5 TABLET ORAL at 22:40

## 2023-04-09 ASSESSMENT — PAIN - FUNCTIONAL ASSESSMENT: PAIN_FUNCTIONAL_ASSESSMENT: NONE - DENIES PAIN

## 2023-04-10 ENCOUNTER — APPOINTMENT (OUTPATIENT)
Dept: MRI IMAGING | Age: 78
DRG: 046 | End: 2023-04-10
Payer: COMMERCIAL

## 2023-04-10 ENCOUNTER — APPOINTMENT (OUTPATIENT)
Dept: ULTRASOUND IMAGING | Age: 78
DRG: 046 | End: 2023-04-10
Payer: COMMERCIAL

## 2023-04-10 LAB
CHOLESTEROL, TOTAL: 170 MG/DL (ref 0–199)
EKG ATRIAL RATE: 55 BPM
EKG P AXIS: 43 DEGREES
EKG P-R INTERVAL: 174 MS
EKG Q-T INTERVAL: 572 MS
EKG QRS DURATION: 74 MS
EKG QTC CALCULATION (BAZETT): 547 MS
EKG R AXIS: 29 DEGREES
EKG T AXIS: 55 DEGREES
EKG VENTRICULAR RATE: 55 BPM
HBA1C MFR BLD: 5.5 % (ref 4–5.6)
HDLC SERPL-MCNC: 32 MG/DL
LDLC SERPL CALC-MCNC: 101 MG/DL (ref 0–99)
TRIGL SERPL-MCNC: 187 MG/DL (ref 0–149)
VLDLC SERPL CALC-MCNC: 37 MG/DL

## 2023-04-10 PROCEDURE — 6370000000 HC RX 637 (ALT 250 FOR IP): Performed by: PSYCHIATRY & NEUROLOGY

## 2023-04-10 PROCEDURE — 99222 1ST HOSP IP/OBS MODERATE 55: CPT | Performed by: PSYCHIATRY & NEUROLOGY

## 2023-04-10 PROCEDURE — 93880 EXTRACRANIAL BILAT STUDY: CPT

## 2023-04-10 PROCEDURE — 70544 MR ANGIOGRAPHY HEAD W/O DYE: CPT

## 2023-04-10 PROCEDURE — 70551 MRI BRAIN STEM W/O DYE: CPT

## 2023-04-10 PROCEDURE — 97530 THERAPEUTIC ACTIVITIES: CPT

## 2023-04-10 PROCEDURE — 97161 PT EVAL LOW COMPLEX 20 MIN: CPT

## 2023-04-10 PROCEDURE — 93010 ELECTROCARDIOGRAM REPORT: CPT | Performed by: INTERNAL MEDICINE

## 2023-04-10 PROCEDURE — 2700000000 HC OXYGEN THERAPY PER DAY

## 2023-04-10 PROCEDURE — 6370000000 HC RX 637 (ALT 250 FOR IP): Performed by: INTERNAL MEDICINE

## 2023-04-10 PROCEDURE — 2060000000 HC ICU INTERMEDIATE R&B

## 2023-04-10 PROCEDURE — 80061 LIPID PANEL: CPT

## 2023-04-10 PROCEDURE — 36415 COLL VENOUS BLD VENIPUNCTURE: CPT

## 2023-04-10 PROCEDURE — 94640 AIRWAY INHALATION TREATMENT: CPT

## 2023-04-10 PROCEDURE — 97165 OT EVAL LOW COMPLEX 30 MIN: CPT

## 2023-04-10 PROCEDURE — 83036 HEMOGLOBIN GLYCOSYLATED A1C: CPT

## 2023-04-10 RX ORDER — CLOPIDOGREL BISULFATE 75 MG/1
75 TABLET ORAL DAILY
Status: DISCONTINUED | OUTPATIENT
Start: 2023-04-10 | End: 2023-04-17 | Stop reason: HOSPADM

## 2023-04-10 RX ADMIN — FUROSEMIDE 20 MG: 20 TABLET ORAL at 08:30

## 2023-04-10 RX ADMIN — CLONAZEPAM 1 MG: 0.5 TABLET ORAL at 08:27

## 2023-04-10 RX ADMIN — PAROXETINE HYDROCHLORIDE 30 MG: 10 TABLET, FILM COATED ORAL at 08:27

## 2023-04-10 RX ADMIN — CLONAZEPAM 1 MG: 0.5 TABLET ORAL at 17:58

## 2023-04-10 RX ADMIN — CLOPIDOGREL BISULFATE 75 MG: 75 TABLET ORAL at 10:00

## 2023-04-10 RX ADMIN — ATORVASTATIN CALCIUM 40 MG: 40 TABLET, FILM COATED ORAL at 08:27

## 2023-04-10 RX ADMIN — BUDESONIDE 1000 MCG: 0.5 INHALANT RESPIRATORY (INHALATION) at 19:42

## 2023-04-10 RX ADMIN — BUDESONIDE 1000 MCG: 0.5 INHALANT RESPIRATORY (INHALATION) at 08:51

## 2023-04-10 RX ADMIN — LISINOPRIL 20 MG: 10 TABLET ORAL at 08:27

## 2023-04-10 RX ADMIN — ASPIRIN 81 MG: 81 TABLET, COATED ORAL at 08:27

## 2023-04-10 RX ADMIN — PANTOPRAZOLE SODIUM 40 MG: 40 TABLET, DELAYED RELEASE ORAL at 05:09

## 2023-04-10 RX ADMIN — DOXEPIN HYDROCHLORIDE 100 MG: 50 CAPSULE ORAL at 20:31

## 2023-04-11 PROBLEM — I67.2 INTRACRANIAL ATHEROSCLEROSIS: Status: ACTIVE | Noted: 2023-04-11

## 2023-04-11 PROBLEM — I67.1 CEREBRAL ANEURYSM: Status: ACTIVE | Noted: 2023-04-11

## 2023-04-11 PROCEDURE — 94640 AIRWAY INHALATION TREATMENT: CPT

## 2023-04-11 PROCEDURE — 6370000000 HC RX 637 (ALT 250 FOR IP): Performed by: PSYCHIATRY & NEUROLOGY

## 2023-04-11 PROCEDURE — 2060000000 HC ICU INTERMEDIATE R&B

## 2023-04-11 PROCEDURE — 6370000000 HC RX 637 (ALT 250 FOR IP): Performed by: INTERNAL MEDICINE

## 2023-04-11 PROCEDURE — 99232 SBSQ HOSP IP/OBS MODERATE 35: CPT

## 2023-04-11 PROCEDURE — 2500000003 HC RX 250 WO HCPCS: Performed by: INTERNAL MEDICINE

## 2023-04-11 PROCEDURE — 99221 1ST HOSP IP/OBS SF/LOW 40: CPT | Performed by: PSYCHIATRY & NEUROLOGY

## 2023-04-11 RX ORDER — ONDANSETRON 4 MG/1
4 TABLET, ORALLY DISINTEGRATING ORAL EVERY 8 HOURS PRN
Status: DISCONTINUED | OUTPATIENT
Start: 2023-04-11 | End: 2023-04-17 | Stop reason: HOSPADM

## 2023-04-11 RX ADMIN — CLONAZEPAM 1 MG: 0.5 TABLET ORAL at 08:59

## 2023-04-11 RX ADMIN — FUROSEMIDE 20 MG: 20 TABLET ORAL at 08:59

## 2023-04-11 RX ADMIN — ASPIRIN 81 MG: 81 TABLET, COATED ORAL at 08:59

## 2023-04-11 RX ADMIN — CLONAZEPAM 1 MG: 0.5 TABLET ORAL at 17:39

## 2023-04-11 RX ADMIN — PAROXETINE HYDROCHLORIDE 30 MG: 10 TABLET, FILM COATED ORAL at 08:58

## 2023-04-11 RX ADMIN — DOXEPIN HYDROCHLORIDE 100 MG: 50 CAPSULE ORAL at 20:22

## 2023-04-11 RX ADMIN — ONDANSETRON 4 MG: 4 TABLET, ORALLY DISINTEGRATING ORAL at 12:53

## 2023-04-11 RX ADMIN — METOPROLOL TARTRATE 25 MG: 25 TABLET, FILM COATED ORAL at 20:22

## 2023-04-11 RX ADMIN — MICONAZOLE NITRATE: 20.6 POWDER TOPICAL at 15:16

## 2023-04-11 RX ADMIN — PANTOPRAZOLE SODIUM 40 MG: 40 TABLET, DELAYED RELEASE ORAL at 05:40

## 2023-04-11 RX ADMIN — LISINOPRIL 20 MG: 10 TABLET ORAL at 08:58

## 2023-04-11 RX ADMIN — METOPROLOL TARTRATE 25 MG: 25 TABLET, FILM COATED ORAL at 14:07

## 2023-04-11 RX ADMIN — CLOPIDOGREL BISULFATE 75 MG: 75 TABLET ORAL at 08:59

## 2023-04-11 RX ADMIN — MICONAZOLE NITRATE: 20.6 POWDER TOPICAL at 20:22

## 2023-04-11 RX ADMIN — BUDESONIDE 1000 MCG: 0.5 INHALANT RESPIRATORY (INHALATION) at 09:05

## 2023-04-11 RX ADMIN — ATORVASTATIN CALCIUM 40 MG: 40 TABLET, FILM COATED ORAL at 08:58

## 2023-04-12 PROCEDURE — 2060000000 HC ICU INTERMEDIATE R&B

## 2023-04-12 PROCEDURE — 6370000000 HC RX 637 (ALT 250 FOR IP): Performed by: PSYCHIATRY & NEUROLOGY

## 2023-04-12 PROCEDURE — 94640 AIRWAY INHALATION TREATMENT: CPT

## 2023-04-12 PROCEDURE — 6370000000 HC RX 637 (ALT 250 FOR IP): Performed by: INTERNAL MEDICINE

## 2023-04-12 PROCEDURE — 6360000002 HC RX W HCPCS: Performed by: INTERNAL MEDICINE

## 2023-04-12 RX ADMIN — DOXEPIN HYDROCHLORIDE 100 MG: 50 CAPSULE ORAL at 20:54

## 2023-04-12 RX ADMIN — LISINOPRIL 20 MG: 10 TABLET ORAL at 09:41

## 2023-04-12 RX ADMIN — ATORVASTATIN CALCIUM 40 MG: 40 TABLET, FILM COATED ORAL at 09:41

## 2023-04-12 RX ADMIN — PANTOPRAZOLE SODIUM 40 MG: 40 TABLET, DELAYED RELEASE ORAL at 05:14

## 2023-04-12 RX ADMIN — ALBUTEROL SULFATE 2.5 MG: 2.5 SOLUTION RESPIRATORY (INHALATION) at 21:03

## 2023-04-12 RX ADMIN — CLONAZEPAM 1 MG: 0.5 TABLET ORAL at 18:02

## 2023-04-12 RX ADMIN — BUDESONIDE 1000 MCG: 0.5 INHALANT RESPIRATORY (INHALATION) at 21:02

## 2023-04-12 RX ADMIN — MICONAZOLE NITRATE: 20.6 POWDER TOPICAL at 09:44

## 2023-04-12 RX ADMIN — METOPROLOL TARTRATE 25 MG: 25 TABLET, FILM COATED ORAL at 20:54

## 2023-04-12 RX ADMIN — MICONAZOLE NITRATE: 20.6 POWDER TOPICAL at 20:55

## 2023-04-12 RX ADMIN — ASPIRIN 81 MG: 81 TABLET, COATED ORAL at 09:41

## 2023-04-12 RX ADMIN — CLONAZEPAM 1 MG: 0.5 TABLET ORAL at 09:41

## 2023-04-12 RX ADMIN — ONDANSETRON 4 MG: 4 TABLET, ORALLY DISINTEGRATING ORAL at 21:43

## 2023-04-12 RX ADMIN — BUDESONIDE 1000 MCG: 0.5 INHALANT RESPIRATORY (INHALATION) at 10:57

## 2023-04-12 RX ADMIN — TRAMADOL HYDROCHLORIDE 50 MG: 50 TABLET, COATED ORAL at 09:41

## 2023-04-12 RX ADMIN — PAROXETINE HYDROCHLORIDE 30 MG: 10 TABLET, FILM COATED ORAL at 09:43

## 2023-04-12 RX ADMIN — CLOPIDOGREL BISULFATE 75 MG: 75 TABLET ORAL at 09:41

## 2023-04-12 RX ADMIN — MELATONIN 3 MG ORAL TABLET 3 MG: 3 TABLET ORAL at 21:43

## 2023-04-12 RX ADMIN — FUROSEMIDE 20 MG: 20 TABLET ORAL at 09:41

## 2023-04-13 PROCEDURE — 6370000000 HC RX 637 (ALT 250 FOR IP): Performed by: PSYCHIATRY & NEUROLOGY

## 2023-04-13 PROCEDURE — 6370000000 HC RX 637 (ALT 250 FOR IP): Performed by: INTERNAL MEDICINE

## 2023-04-13 PROCEDURE — 1200000000 HC SEMI PRIVATE

## 2023-04-13 PROCEDURE — 94640 AIRWAY INHALATION TREATMENT: CPT

## 2023-04-13 RX ORDER — CALCIUM CARBONATE 200(500)MG
500 TABLET,CHEWABLE ORAL 3 TIMES DAILY PRN
Status: DISCONTINUED | OUTPATIENT
Start: 2023-04-13 | End: 2023-04-17 | Stop reason: HOSPADM

## 2023-04-13 RX ADMIN — BUDESONIDE 1000 MCG: 0.5 INHALANT RESPIRATORY (INHALATION) at 10:42

## 2023-04-13 RX ADMIN — CLONAZEPAM 1 MG: 0.5 TABLET ORAL at 20:29

## 2023-04-13 RX ADMIN — MICONAZOLE NITRATE: 20.6 POWDER TOPICAL at 20:31

## 2023-04-13 RX ADMIN — LISINOPRIL 20 MG: 10 TABLET ORAL at 09:51

## 2023-04-13 RX ADMIN — CLONAZEPAM 1 MG: 0.5 TABLET ORAL at 09:51

## 2023-04-13 RX ADMIN — CLOPIDOGREL BISULFATE 75 MG: 75 TABLET ORAL at 09:51

## 2023-04-13 RX ADMIN — MICONAZOLE NITRATE: 20.6 POWDER TOPICAL at 11:57

## 2023-04-13 RX ADMIN — BUDESONIDE 1000 MCG: 0.5 INHALANT RESPIRATORY (INHALATION) at 19:56

## 2023-04-13 RX ADMIN — PANTOPRAZOLE SODIUM 40 MG: 40 TABLET, DELAYED RELEASE ORAL at 05:43

## 2023-04-13 RX ADMIN — ONDANSETRON 4 MG: 4 TABLET, ORALLY DISINTEGRATING ORAL at 11:56

## 2023-04-13 RX ADMIN — FUROSEMIDE 20 MG: 20 TABLET ORAL at 11:55

## 2023-04-13 RX ADMIN — ATORVASTATIN CALCIUM 40 MG: 40 TABLET, FILM COATED ORAL at 09:51

## 2023-04-13 RX ADMIN — ASPIRIN 81 MG: 81 TABLET, COATED ORAL at 09:51

## 2023-04-13 RX ADMIN — TRAMADOL HYDROCHLORIDE 50 MG: 50 TABLET, COATED ORAL at 09:51

## 2023-04-13 RX ADMIN — PAROXETINE HYDROCHLORIDE 30 MG: 10 TABLET, FILM COATED ORAL at 09:51

## 2023-04-13 RX ADMIN — DOXEPIN HYDROCHLORIDE 100 MG: 50 CAPSULE ORAL at 20:28

## 2023-04-13 RX ADMIN — METOPROLOL TARTRATE 25 MG: 25 TABLET, FILM COATED ORAL at 20:28

## 2023-04-13 RX ADMIN — CALCIUM CARBONATE 500 MG: 500 TABLET, CHEWABLE ORAL at 20:28

## 2023-04-14 PROCEDURE — 94640 AIRWAY INHALATION TREATMENT: CPT

## 2023-04-14 PROCEDURE — 2700000000 HC OXYGEN THERAPY PER DAY

## 2023-04-14 PROCEDURE — 97530 THERAPEUTIC ACTIVITIES: CPT

## 2023-04-14 PROCEDURE — 97535 SELF CARE MNGMENT TRAINING: CPT

## 2023-04-14 PROCEDURE — 6370000000 HC RX 637 (ALT 250 FOR IP): Performed by: INTERNAL MEDICINE

## 2023-04-14 PROCEDURE — 6370000000 HC RX 637 (ALT 250 FOR IP): Performed by: PSYCHIATRY & NEUROLOGY

## 2023-04-14 PROCEDURE — 1200000000 HC SEMI PRIVATE

## 2023-04-14 RX ORDER — CLOPIDOGREL BISULFATE 75 MG/1
75 TABLET ORAL DAILY
Qty: 30 TABLET | Refills: 3 | DISCHARGE
Start: 2023-04-15

## 2023-04-14 RX ORDER — ATORVASTATIN CALCIUM 40 MG/1
40 TABLET, FILM COATED ORAL DAILY
Qty: 30 TABLET | Refills: 3 | DISCHARGE
Start: 2023-04-15

## 2023-04-14 RX ADMIN — BUDESONIDE 1000 MCG: 0.5 INHALANT RESPIRATORY (INHALATION) at 09:49

## 2023-04-14 RX ADMIN — MICONAZOLE NITRATE: 20.6 POWDER TOPICAL at 08:29

## 2023-04-14 RX ADMIN — LISINOPRIL 20 MG: 10 TABLET ORAL at 08:27

## 2023-04-14 RX ADMIN — DOXEPIN HYDROCHLORIDE 100 MG: 50 CAPSULE ORAL at 20:50

## 2023-04-14 RX ADMIN — BUDESONIDE 1000 MCG: 0.5 INHALANT RESPIRATORY (INHALATION) at 19:48

## 2023-04-14 RX ADMIN — PANTOPRAZOLE SODIUM 40 MG: 40 TABLET, DELAYED RELEASE ORAL at 05:06

## 2023-04-14 RX ADMIN — METOPROLOL TARTRATE 25 MG: 25 TABLET, FILM COATED ORAL at 08:27

## 2023-04-14 RX ADMIN — FUROSEMIDE 20 MG: 20 TABLET ORAL at 08:27

## 2023-04-14 RX ADMIN — CLOPIDOGREL BISULFATE 75 MG: 75 TABLET ORAL at 08:27

## 2023-04-14 RX ADMIN — ATORVASTATIN CALCIUM 40 MG: 40 TABLET, FILM COATED ORAL at 08:27

## 2023-04-14 RX ADMIN — MICONAZOLE NITRATE: 20.6 POWDER TOPICAL at 20:51

## 2023-04-14 RX ADMIN — CALCIUM CARBONATE 500 MG: 500 TABLET, CHEWABLE ORAL at 20:50

## 2023-04-14 RX ADMIN — ASPIRIN 81 MG: 81 TABLET, COATED ORAL at 08:27

## 2023-04-14 RX ADMIN — METOPROLOL TARTRATE 25 MG: 25 TABLET, FILM COATED ORAL at 20:52

## 2023-04-14 RX ADMIN — ONDANSETRON 4 MG: 4 TABLET, ORALLY DISINTEGRATING ORAL at 08:28

## 2023-04-14 RX ADMIN — PAROXETINE HYDROCHLORIDE 30 MG: 10 TABLET, FILM COATED ORAL at 08:33

## 2023-04-15 PROCEDURE — 1200000000 HC SEMI PRIVATE

## 2023-04-15 PROCEDURE — 94640 AIRWAY INHALATION TREATMENT: CPT

## 2023-04-15 PROCEDURE — 2700000000 HC OXYGEN THERAPY PER DAY

## 2023-04-15 PROCEDURE — 6370000000 HC RX 637 (ALT 250 FOR IP): Performed by: PSYCHIATRY & NEUROLOGY

## 2023-04-15 PROCEDURE — 6370000000 HC RX 637 (ALT 250 FOR IP): Performed by: INTERNAL MEDICINE

## 2023-04-15 RX ADMIN — BUDESONIDE 1000 MCG: 0.5 INHALANT RESPIRATORY (INHALATION) at 10:51

## 2023-04-15 RX ADMIN — PAROXETINE HYDROCHLORIDE 30 MG: 10 TABLET, FILM COATED ORAL at 09:43

## 2023-04-15 RX ADMIN — METOPROLOL TARTRATE 25 MG: 25 TABLET, FILM COATED ORAL at 14:25

## 2023-04-15 RX ADMIN — MICONAZOLE NITRATE: 20.6 POWDER TOPICAL at 20:38

## 2023-04-15 RX ADMIN — METOPROLOL TARTRATE 25 MG: 25 TABLET, FILM COATED ORAL at 20:36

## 2023-04-15 RX ADMIN — CALCIUM CARBONATE 500 MG: 500 TABLET, CHEWABLE ORAL at 20:36

## 2023-04-15 RX ADMIN — PANTOPRAZOLE SODIUM 40 MG: 40 TABLET, DELAYED RELEASE ORAL at 06:26

## 2023-04-15 RX ADMIN — DOXEPIN HYDROCHLORIDE 100 MG: 50 CAPSULE ORAL at 20:36

## 2023-04-15 RX ADMIN — FUROSEMIDE 20 MG: 20 TABLET ORAL at 09:44

## 2023-04-15 RX ADMIN — LISINOPRIL 20 MG: 10 TABLET ORAL at 09:44

## 2023-04-15 RX ADMIN — CLONAZEPAM 1 MG: 0.5 TABLET ORAL at 09:44

## 2023-04-15 RX ADMIN — ATORVASTATIN CALCIUM 40 MG: 40 TABLET, FILM COATED ORAL at 09:44

## 2023-04-15 RX ADMIN — MICONAZOLE NITRATE: 20.6 POWDER TOPICAL at 09:45

## 2023-04-15 RX ADMIN — TRAMADOL HYDROCHLORIDE 50 MG: 50 TABLET, COATED ORAL at 20:36

## 2023-04-15 RX ADMIN — BUDESONIDE 1000 MCG: 0.5 INHALANT RESPIRATORY (INHALATION) at 19:38

## 2023-04-15 RX ADMIN — CLONAZEPAM 1 MG: 0.5 TABLET ORAL at 17:06

## 2023-04-15 RX ADMIN — ASPIRIN 81 MG: 81 TABLET, COATED ORAL at 09:43

## 2023-04-15 RX ADMIN — MELATONIN 3 MG ORAL TABLET 3 MG: 3 TABLET ORAL at 20:36

## 2023-04-15 RX ADMIN — CLOPIDOGREL BISULFATE 75 MG: 75 TABLET ORAL at 09:44

## 2023-04-15 RX ADMIN — METOPROLOL TARTRATE 25 MG: 25 TABLET, FILM COATED ORAL at 09:44

## 2023-04-15 ASSESSMENT — PAIN SCALES - GENERAL
PAINLEVEL_OUTOF10: 9
PAINLEVEL_OUTOF10: 0

## 2023-04-15 ASSESSMENT — PAIN DESCRIPTION - DESCRIPTORS: DESCRIPTORS: ACHING;SORE;SHARP

## 2023-04-15 ASSESSMENT — PAIN DESCRIPTION - LOCATION: LOCATION: LEG

## 2023-04-15 ASSESSMENT — PAIN DESCRIPTION - ORIENTATION: ORIENTATION: LEFT

## 2023-04-16 PROCEDURE — 97530 THERAPEUTIC ACTIVITIES: CPT

## 2023-04-16 PROCEDURE — 2700000000 HC OXYGEN THERAPY PER DAY

## 2023-04-16 PROCEDURE — 2580000003 HC RX 258: Performed by: INTERNAL MEDICINE

## 2023-04-16 PROCEDURE — 1200000000 HC SEMI PRIVATE

## 2023-04-16 PROCEDURE — 97535 SELF CARE MNGMENT TRAINING: CPT

## 2023-04-16 PROCEDURE — 6370000000 HC RX 637 (ALT 250 FOR IP): Performed by: INTERNAL MEDICINE

## 2023-04-16 PROCEDURE — 6370000000 HC RX 637 (ALT 250 FOR IP): Performed by: PSYCHIATRY & NEUROLOGY

## 2023-04-16 PROCEDURE — 94640 AIRWAY INHALATION TREATMENT: CPT

## 2023-04-16 RX ORDER — 0.9 % SODIUM CHLORIDE 0.9 %
1000 INTRAVENOUS SOLUTION INTRAVENOUS ONCE
Status: COMPLETED | OUTPATIENT
Start: 2023-04-16 | End: 2023-04-16

## 2023-04-16 RX ORDER — TRAMADOL HYDROCHLORIDE 50 MG/1
50 TABLET ORAL EVERY 8 HOURS PRN
Status: DISCONTINUED | OUTPATIENT
Start: 2023-04-16 | End: 2023-04-17 | Stop reason: HOSPADM

## 2023-04-16 RX ORDER — HYDROCODONE BITARTRATE AND ACETAMINOPHEN 5; 325 MG/1; MG/1
1 TABLET ORAL EVERY 6 HOURS PRN
Status: DISCONTINUED | OUTPATIENT
Start: 2023-04-16 | End: 2023-04-17 | Stop reason: HOSPADM

## 2023-04-16 RX ADMIN — CLONAZEPAM 1 MG: 0.5 TABLET ORAL at 21:24

## 2023-04-16 RX ADMIN — LISINOPRIL 20 MG: 10 TABLET ORAL at 08:44

## 2023-04-16 RX ADMIN — DOXEPIN HYDROCHLORIDE 100 MG: 50 CAPSULE ORAL at 21:24

## 2023-04-16 RX ADMIN — BUDESONIDE 1000 MCG: 0.5 INHALANT RESPIRATORY (INHALATION) at 19:58

## 2023-04-16 RX ADMIN — ATORVASTATIN CALCIUM 40 MG: 40 TABLET, FILM COATED ORAL at 08:45

## 2023-04-16 RX ADMIN — BUDESONIDE 1000 MCG: 0.5 INHALANT RESPIRATORY (INHALATION) at 10:31

## 2023-04-16 RX ADMIN — HYDROCODONE BITARTRATE AND ACETAMINOPHEN 1 TABLET: 5; 325 TABLET ORAL at 12:53

## 2023-04-16 RX ADMIN — CLONAZEPAM 1 MG: 0.5 TABLET ORAL at 08:44

## 2023-04-16 RX ADMIN — MICONAZOLE NITRATE: 20.6 POWDER TOPICAL at 08:45

## 2023-04-16 RX ADMIN — CALCIUM CARBONATE 500 MG: 500 TABLET, CHEWABLE ORAL at 14:41

## 2023-04-16 RX ADMIN — SODIUM CHLORIDE 1000 ML: 9 INJECTION, SOLUTION INTRAVENOUS at 17:00

## 2023-04-16 RX ADMIN — CALCIUM CARBONATE 500 MG: 500 TABLET, CHEWABLE ORAL at 21:24

## 2023-04-16 RX ADMIN — MICONAZOLE NITRATE: 20.6 POWDER TOPICAL at 21:24

## 2023-04-16 RX ADMIN — FUROSEMIDE 20 MG: 20 TABLET ORAL at 08:45

## 2023-04-16 RX ADMIN — METOPROLOL TARTRATE 25 MG: 25 TABLET, FILM COATED ORAL at 14:40

## 2023-04-16 RX ADMIN — ASPIRIN 81 MG: 81 TABLET, COATED ORAL at 08:44

## 2023-04-16 RX ADMIN — PAROXETINE HYDROCHLORIDE 30 MG: 10 TABLET, FILM COATED ORAL at 08:44

## 2023-04-16 RX ADMIN — PANTOPRAZOLE SODIUM 40 MG: 40 TABLET, DELAYED RELEASE ORAL at 05:58

## 2023-04-16 RX ADMIN — TRAMADOL HYDROCHLORIDE 50 MG: 50 TABLET, COATED ORAL at 05:58

## 2023-04-16 RX ADMIN — CLOPIDOGREL BISULFATE 75 MG: 75 TABLET ORAL at 08:44

## 2023-04-16 RX ADMIN — MELATONIN 3 MG ORAL TABLET 3 MG: 3 TABLET ORAL at 21:25

## 2023-04-16 ASSESSMENT — PAIN DESCRIPTION - DESCRIPTORS
DESCRIPTORS: ACHING;DISCOMFORT
DESCRIPTORS: DISCOMFORT;CRUSHING;SORE

## 2023-04-16 ASSESSMENT — PAIN SCALES - GENERAL
PAINLEVEL_OUTOF10: 0
PAINLEVEL_OUTOF10: 8
PAINLEVEL_OUTOF10: 0
PAINLEVEL_OUTOF10: 7

## 2023-04-16 ASSESSMENT — PAIN DESCRIPTION - ORIENTATION
ORIENTATION: LEFT
ORIENTATION: LEFT

## 2023-04-16 ASSESSMENT — PAIN DESCRIPTION - LOCATION
LOCATION: ARM;LEG
LOCATION: ARM;LEG

## 2023-04-17 VITALS
RESPIRATION RATE: 16 BRPM | DIASTOLIC BLOOD PRESSURE: 62 MMHG | WEIGHT: 198 LBS | BODY MASS INDEX: 37.41 KG/M2 | HEART RATE: 82 BPM | TEMPERATURE: 97.6 F | OXYGEN SATURATION: 96 % | SYSTOLIC BLOOD PRESSURE: 158 MMHG

## 2023-04-17 LAB — SARS-COV-2 RDRP RESP QL NAA+PROBE: NOT DETECTED

## 2023-04-17 PROCEDURE — 6370000000 HC RX 637 (ALT 250 FOR IP): Performed by: INTERNAL MEDICINE

## 2023-04-17 PROCEDURE — 2700000000 HC OXYGEN THERAPY PER DAY

## 2023-04-17 PROCEDURE — 87635 SARS-COV-2 COVID-19 AMP PRB: CPT

## 2023-04-17 PROCEDURE — 94640 AIRWAY INHALATION TREATMENT: CPT

## 2023-04-17 PROCEDURE — 6370000000 HC RX 637 (ALT 250 FOR IP): Performed by: PSYCHIATRY & NEUROLOGY

## 2023-04-17 RX ORDER — HYDROCODONE BITARTRATE AND ACETAMINOPHEN 5; 325 MG/1; MG/1
1 TABLET ORAL EVERY 6 HOURS PRN
Qty: 20 TABLET | Refills: 0 | Status: SHIPPED | OUTPATIENT
Start: 2023-04-17 | End: 2023-04-22

## 2023-04-17 RX ADMIN — CLOPIDOGREL BISULFATE 75 MG: 75 TABLET ORAL at 09:43

## 2023-04-17 RX ADMIN — PAROXETINE HYDROCHLORIDE 30 MG: 10 TABLET, FILM COATED ORAL at 09:43

## 2023-04-17 RX ADMIN — BUDESONIDE 1000 MCG: 0.5 INHALANT RESPIRATORY (INHALATION) at 09:56

## 2023-04-17 RX ADMIN — METOPROLOL TARTRATE 25 MG: 25 TABLET, FILM COATED ORAL at 09:43

## 2023-04-17 RX ADMIN — ATORVASTATIN CALCIUM 40 MG: 40 TABLET, FILM COATED ORAL at 09:43

## 2023-04-17 RX ADMIN — CLONAZEPAM 1 MG: 0.5 TABLET ORAL at 09:43

## 2023-04-17 RX ADMIN — PANTOPRAZOLE SODIUM 40 MG: 40 TABLET, DELAYED RELEASE ORAL at 05:52

## 2023-04-17 RX ADMIN — FUROSEMIDE 20 MG: 20 TABLET ORAL at 09:44

## 2023-04-17 RX ADMIN — LISINOPRIL 20 MG: 10 TABLET ORAL at 09:44

## 2023-04-17 RX ADMIN — ASPIRIN 81 MG: 81 TABLET, COATED ORAL at 09:44

## 2023-04-17 RX ADMIN — MICONAZOLE NITRATE: 20.6 POWDER TOPICAL at 09:43

## 2023-04-17 ASSESSMENT — PAIN SCALES - GENERAL
PAINLEVEL_OUTOF10: 0

## 2023-04-17 NOTE — CARE COORDINATION
[facility]( [address] [place] [address], [facility]):  met with pt and daughter, miguelina, in the room this a.m. the plan is to [facility] today, precert obtained. Pasrr done. Pas ambulette with o2 at 3l  at  12 p.m. . Snf;loc. Rn to run rapid covid. Mini Joya, DORINA  4/17/2023

## 2023-04-17 NOTE — PROGRESS NOTES
Patient having frequent watery stool. Dr. Ana Campos notified via [address] [place] [address]. Awaiting new orders.

## 2023-04-19 NOTE — DISCHARGE SUMMARY
Physician Discharge Summary     Patient ID:  Bijan Schwartz  [id number]  69 y.o.  1945    Admit date: 4/9/2023    Discharge date and time: 4/17/2023 11:43 AM     Admission Diagnoses: Dysarthria [R47.1]  Facial droop [R29.810]  Acute CVA (cerebrovascular accident) [facility]) [I63.9]  Cerebrovascular accident (CVA), unspecified mechanism ([facility] [address] [I63.9]    Discharge Diagnoses:   Acute CVA  Ataxia  Intracranial aneurysm  Patient Active Problem List   Diagnosis    Aortic stenosis    COPD (chronic obstructive pulmonary disease) ([facility] [address]    Hypertension    H/O hyperlipidemia    Coronary artery disease involving native coronary artery of native heart without angina pectoris    [facility]    Cerebral aneurysm    Intracranial atherosclerosis       Consults: Neurology    Procedures:     Hospital Course:   79-year-old woman admitted due to stumbling to the left with dysarthria  Symptoms resolved spontaneously  Admitted to telemetry neuro floor  Seen by neurology  Intracranial aneurysm of no clinical significance reported by MRI  She remained stable during the rest of the hospital course  Being discharged to subacute rehab  Dual antiplatelet therapy recommended followed by stroke clinic follow-up  Discharge Exam:    Patient was seen on the floor prior to discharge  Overall she was doing better  Neurologically intact  Data reviewed in detail with the patient  Disposition: Subacute rehab  Stable at the time of discharge  Patient Instructions:   Discharge Medication List as of 4/17/2023 11:04 AM        START taking these medications    Details   HYDROcodone-acetaminophen (NORCO) 5-325 MG per tablet Take 1 tablet by mouth every 6 hours as needed for Pain for up to 5 days.  Max Daily Amount: 4 tablets, Disp-20 tablet, R-0Print      atorvastatin (LIPITOR) 40 MG tablet Take 1 tablet by mouth daily, Disp-30 tablet, R-3DC to SNF      clopidogrel (PLAVIX) 75 MG tablet Take 1 tablet by mouth daily, Disp-30 tablet,

## 2023-04-20 ENCOUNTER — TELEPHONE (OUTPATIENT)
Dept: ADMINISTRATIVE | Age: 78
End: 2023-04-20

## 2023-05-01 ENCOUNTER — TELEPHONE (OUTPATIENT)
Dept: ADMINISTRATIVE | Age: 78
End: 2023-05-01

## 2023-06-08 ENCOUNTER — OFFICE VISIT (OUTPATIENT)
Dept: NEUROLOGY | Age: 78
End: 2023-06-08
Payer: COMMERCIAL

## 2023-06-08 VITALS
OXYGEN SATURATION: 95 % | DIASTOLIC BLOOD PRESSURE: 56 MMHG | SYSTOLIC BLOOD PRESSURE: 120 MMHG | TEMPERATURE: 98 F | WEIGHT: 204 LBS | BODY MASS INDEX: 38.55 KG/M2 | HEART RATE: 59 BPM

## 2023-06-08 DIAGNOSIS — I67.1 CEREBRAL ANEURYSM: ICD-10-CM

## 2023-06-08 DIAGNOSIS — I67.2 INTRACRANIAL ATHEROSCLEROSIS: Primary | ICD-10-CM

## 2023-06-08 DIAGNOSIS — I65.23 CAROTID STENOSIS, SYMPTOMATIC W/O INFARCT, BILATERAL: ICD-10-CM

## 2023-06-08 PROCEDURE — G8417 CALC BMI ABV UP PARAM F/U: HCPCS | Performed by: PSYCHIATRY & NEUROLOGY

## 2023-06-08 PROCEDURE — G8400 PT W/DXA NO RESULTS DOC: HCPCS | Performed by: PSYCHIATRY & NEUROLOGY

## 2023-06-08 PROCEDURE — G8427 DOCREV CUR MEDS BY ELIG CLIN: HCPCS | Performed by: PSYCHIATRY & NEUROLOGY

## 2023-06-08 PROCEDURE — 99215 OFFICE O/P EST HI 40 MIN: CPT | Performed by: PSYCHIATRY & NEUROLOGY

## 2023-06-08 PROCEDURE — 3078F DIAST BP <80 MM HG: CPT | Performed by: PSYCHIATRY & NEUROLOGY

## 2023-06-08 PROCEDURE — 1090F PRES/ABSN URINE INCON ASSESS: CPT | Performed by: PSYCHIATRY & NEUROLOGY

## 2023-06-08 PROCEDURE — 1123F ACP DISCUSS/DSCN MKR DOCD: CPT | Performed by: PSYCHIATRY & NEUROLOGY

## 2023-06-08 PROCEDURE — 1036F TOBACCO NON-USER: CPT | Performed by: PSYCHIATRY & NEUROLOGY

## 2023-06-08 PROCEDURE — 3074F SYST BP LT 130 MM HG: CPT | Performed by: PSYCHIATRY & NEUROLOGY

## 2023-06-08 RX ORDER — LOPERAMIDE HYDROCHLORIDE 2 MG/1
CAPSULE ORAL
COMMUNITY
Start: 2023-04-21

## 2023-06-08 RX ORDER — SODIUM PHOSPHATE, DIBASIC AND SODIUM PHOSPHATE, MONOBASIC 3.5; 9.5 G/66ML; G/66ML
1 ENEMA RECTAL PRN
COMMUNITY

## 2023-06-08 RX ORDER — HYDROCORTISONE 25 MG/G
CREAM TOPICAL
COMMUNITY
Start: 2023-05-17

## 2023-06-08 RX ORDER — CLONAZEPAM 0.5 MG/1
TABLET ORAL
COMMUNITY
Start: 2023-05-31

## 2023-06-08 RX ORDER — CYANOCOBALAMIN 1000 UG/ML
INJECTION, SOLUTION INTRAMUSCULAR; SUBCUTANEOUS
COMMUNITY
Start: 2023-06-02

## 2023-06-08 RX ORDER — MIRTAZAPINE 15 MG/1
TABLET, FILM COATED ORAL
COMMUNITY
Start: 2023-05-11

## 2023-06-08 RX ORDER — HYDROCODONE BITARTRATE AND ACETAMINOPHEN 5; 325 MG/1; MG/1
TABLET ORAL
COMMUNITY
Start: 2023-05-18

## 2023-06-08 RX ORDER — EZETIMIBE 10 MG/1
10 TABLET ORAL DAILY
Qty: 90 TABLET | Refills: 1 | Status: SHIPPED | OUTPATIENT
Start: 2023-06-08

## 2023-06-08 RX ORDER — ONDANSETRON 4 MG/1
TABLET, ORALLY DISINTEGRATING ORAL
COMMUNITY

## 2023-06-08 RX ORDER — CHOLECALCIFEROL (VITAMIN D3) 125 MCG
CAPSULE ORAL
COMMUNITY
Start: 2023-05-10

## 2023-06-08 NOTE — PROGRESS NOTES
by mouth daily 16  Yes Historical Provider, MD   benazepril (LOTENSIN) 20 MG tablet Take 1 tablet by mouth daily 16  Yes Historical Provider, MD   omeprazole (PRILOSEC) 40 MG delayed release capsule Take 1 capsule by mouth every evening   Yes Historical Provider, MD   albuterol (PROVENTIL HFA;VENTOLIN HFA) 108 (90 BASE) MCG/ACT inhaler Inhale 2 puffs into the lungs every 4 hours as needed Instructed to take am of procedure   Yes Historical Provider, MD   doxepin (SINEQUAN) 100 MG capsule Take 1 capsule by mouth nightly 18   Becky Hadley DO   budesonide (PULMICORT) 0.5 MG/2ML nebulizer suspension Take 4 mLs by nebulization 2 times daily 17   Becky Hadley DO   PARoxetine (PAXIL) 10 MG tablet Take 3 tablets by mouth daily 14   Historical Provider, MD       Social History:     Social History     Tobacco Use    Smoking status: Former     Packs/day: 1.50     Years: 55.00     Pack years: 82.50     Types: Cigarettes     Start date:      Quit date:      Years since quittin.4    Smokeless tobacco: Never   Vaping Use    Vaping Use: Never used   Substance Use Topics    Alcohol use: No     Alcohol/week: 0.0 standard drinks    Drug use: No       Review of Systems:     No chest pain or palpitations  No SOB  No vertigo, lightheadedness or loss of consciousness  No falls, tripping or stumbling  No incontinence of bowels or bladder  No itching or bruising appreciated  No numbness, tingling or focal arm/leg weakness    ROS otherwise negative     Family History:     Family History   Problem Relation Age of Onset    Stroke Mother     Heart Disease Mother     Heart Disease Father     Asthma Father     Emphysema Father     Cancer Sister     Cancer Brother     Cancer Sister     Cancer Brother     Cancer Brother     Heart Disease Brother     Stroke Brother     Heart Disease Brother     Emphysema Brother     Emphysema Brother         History of Present Illness:        The patient is a 69 y.o. female

## 2023-06-08 NOTE — PATIENT INSTRUCTIONS
ASA 81 mg  Plavix 75 mg daily   Lipitor 40 mg QHS  Zetia 10 mg daily       Does not want any intervention for aneursym at this time will follow up in 1 year

## 2023-12-10 ENCOUNTER — HOSPITAL ENCOUNTER (INPATIENT)
Age: 78
LOS: 9 days | Discharge: NURSING, REHAB OR LONG-TERM CARE | DRG: 720 | End: 2023-12-19
Attending: EMERGENCY MEDICINE | Admitting: INTERNAL MEDICINE
Payer: COMMERCIAL

## 2023-12-10 ENCOUNTER — APPOINTMENT (OUTPATIENT)
Dept: CT IMAGING | Age: 78
DRG: 720 | End: 2023-12-10
Payer: COMMERCIAL

## 2023-12-10 DIAGNOSIS — G45.9 TIA (TRANSIENT ISCHEMIC ATTACK): ICD-10-CM

## 2023-12-10 DIAGNOSIS — N30.01 ACUTE CYSTITIS WITH HEMATURIA: ICD-10-CM

## 2023-12-10 DIAGNOSIS — I35.0 NONRHEUMATIC AORTIC VALVE STENOSIS: Primary | ICD-10-CM

## 2023-12-10 DIAGNOSIS — R29.90 STROKE-LIKE SYMPTOMS: ICD-10-CM

## 2023-12-10 LAB
ALBUMIN SERPL-MCNC: 3.6 G/DL (ref 3.5–5.2)
ALP SERPL-CCNC: 93 U/L (ref 35–104)
ALT SERPL-CCNC: 8 U/L (ref 0–32)
ANION GAP SERPL CALCULATED.3IONS-SCNC: 14 MMOL/L (ref 7–16)
AST SERPL-CCNC: 17 U/L (ref 0–31)
BACTERIA URNS QL MICRO: ABNORMAL
BASOPHILS # BLD: 0.07 K/UL (ref 0–0.2)
BASOPHILS NFR BLD: 1 % (ref 0–2)
BILIRUB SERPL-MCNC: 0.9 MG/DL (ref 0–1.2)
BILIRUB UR QL STRIP: NEGATIVE
BUN SERPL-MCNC: 18 MG/DL (ref 6–23)
CALCIUM SERPL-MCNC: 8.6 MG/DL (ref 8.6–10.2)
CHLORIDE SERPL-SCNC: 97 MMOL/L (ref 98–107)
CLARITY UR: ABNORMAL
CO2 SERPL-SCNC: 25 MMOL/L (ref 22–29)
COLOR UR: YELLOW
CREAT SERPL-MCNC: 1.2 MG/DL (ref 0.5–1)
EOSINOPHIL # BLD: 0 K/UL (ref 0.05–0.5)
EOSINOPHILS RELATIVE PERCENT: 0 % (ref 0–6)
EPI CELLS #/AREA URNS HPF: ABNORMAL /HPF
ERYTHROCYTE [DISTWIDTH] IN BLOOD BY AUTOMATED COUNT: 12.2 % (ref 11.5–15)
GFR SERPL CREATININE-BSD FRML MDRD: 49 ML/MIN/1.73M2
GLUCOSE BLD-MCNC: 124 MG/DL (ref 74–99)
GLUCOSE SERPL-MCNC: 125 MG/DL (ref 74–99)
GLUCOSE UR STRIP-MCNC: NEGATIVE MG/DL
HCT VFR BLD AUTO: 33.8 % (ref 34–48)
HGB BLD-MCNC: 10.9 G/DL (ref 11.5–15.5)
HGB UR QL STRIP.AUTO: ABNORMAL
IMM GRANULOCYTES # BLD AUTO: 0.06 K/UL (ref 0–0.58)
IMM GRANULOCYTES NFR BLD: 0 % (ref 0–5)
INR PPP: 1.3
KETONES UR STRIP-MCNC: NEGATIVE MG/DL
LEUKOCYTE ESTERASE UR QL STRIP: ABNORMAL
LYMPHOCYTES NFR BLD: 0.71 K/UL (ref 1.5–4)
LYMPHOCYTES RELATIVE PERCENT: 5 % (ref 20–42)
MCH RBC QN AUTO: 30.8 PG (ref 26–35)
MCHC RBC AUTO-ENTMCNC: 32.2 G/DL (ref 32–34.5)
MCV RBC AUTO: 95.5 FL (ref 80–99.9)
MONOCYTES NFR BLD: 1.48 K/UL (ref 0.1–0.95)
MONOCYTES NFR BLD: 10 % (ref 2–12)
MUCOUS THREADS URNS QL MICRO: PRESENT
NEUTROPHILS NFR BLD: 84 % (ref 43–80)
NEUTS SEG NFR BLD: 12.61 K/UL (ref 1.8–7.3)
NITRITE UR QL STRIP: POSITIVE
PARTIAL THROMBOPLASTIN TIME: 34.3 SEC (ref 24.5–35.1)
PH UR STRIP: 6 [PH] (ref 5–9)
PLATELET # BLD AUTO: 225 K/UL (ref 130–450)
PMV BLD AUTO: 10.3 FL (ref 7–12)
POTASSIUM SERPL-SCNC: 3.7 MMOL/L (ref 3.5–5)
PROT SERPL-MCNC: 6.1 G/DL (ref 6.4–8.3)
PROT UR STRIP-MCNC: 100 MG/DL
PROTHROMBIN TIME: 14.5 SEC (ref 9.3–12.4)
RBC # BLD AUTO: 3.54 M/UL (ref 3.5–5.5)
RBC #/AREA URNS HPF: ABNORMAL /HPF
SODIUM SERPL-SCNC: 136 MMOL/L (ref 132–146)
SP GR UR STRIP: 1.02 (ref 1–1.03)
TROPONIN I SERPL HS-MCNC: 26 NG/L (ref 0–9)
UROBILINOGEN UR STRIP-ACNC: 1 EU/DL (ref 0–1)
WBC #/AREA URNS HPF: ABNORMAL /HPF
WBC OTHER # BLD: 14.9 K/UL (ref 4.5–11.5)

## 2023-12-10 PROCEDURE — 99285 EMERGENCY DEPT VISIT HI MDM: CPT

## 2023-12-10 PROCEDURE — 70496 CT ANGIOGRAPHY HEAD: CPT

## 2023-12-10 PROCEDURE — 2060000000 HC ICU INTERMEDIATE R&B

## 2023-12-10 PROCEDURE — 84484 ASSAY OF TROPONIN QUANT: CPT

## 2023-12-10 PROCEDURE — 82962 GLUCOSE BLOOD TEST: CPT

## 2023-12-10 PROCEDURE — 85025 COMPLETE CBC W/AUTO DIFF WBC: CPT

## 2023-12-10 PROCEDURE — 96374 THER/PROPH/DIAG INJ IV PUSH: CPT

## 2023-12-10 PROCEDURE — 70498 CT ANGIOGRAPHY NECK: CPT

## 2023-12-10 PROCEDURE — 99223 1ST HOSP IP/OBS HIGH 75: CPT | Performed by: INTERNAL MEDICINE

## 2023-12-10 PROCEDURE — 80053 COMPREHEN METABOLIC PANEL: CPT

## 2023-12-10 PROCEDURE — 6360000002 HC RX W HCPCS: Performed by: EMERGENCY MEDICINE

## 2023-12-10 PROCEDURE — 70450 CT HEAD/BRAIN W/O DYE: CPT

## 2023-12-10 PROCEDURE — 81001 URINALYSIS AUTO W/SCOPE: CPT

## 2023-12-10 PROCEDURE — 85730 THROMBOPLASTIN TIME PARTIAL: CPT

## 2023-12-10 PROCEDURE — 6360000004 HC RX CONTRAST MEDICATION: Performed by: RADIOLOGY

## 2023-12-10 PROCEDURE — 85610 PROTHROMBIN TIME: CPT

## 2023-12-10 PROCEDURE — 72125 CT NECK SPINE W/O DYE: CPT

## 2023-12-10 PROCEDURE — 93005 ELECTROCARDIOGRAM TRACING: CPT

## 2023-12-10 PROCEDURE — 87088 URINE BACTERIA CULTURE: CPT

## 2023-12-10 PROCEDURE — 87086 URINE CULTURE/COLONY COUNT: CPT

## 2023-12-10 RX ORDER — SODIUM CHLORIDE 9 MG/ML
INJECTION, SOLUTION INTRAVENOUS CONTINUOUS
Status: DISCONTINUED | OUTPATIENT
Start: 2023-12-10 | End: 2023-12-19 | Stop reason: HOSPADM

## 2023-12-10 RX ORDER — SODIUM CHLORIDE 9 MG/ML
INJECTION, SOLUTION INTRAVENOUS PRN
Status: DISCONTINUED | OUTPATIENT
Start: 2023-12-10 | End: 2023-12-19 | Stop reason: HOSPADM

## 2023-12-10 RX ORDER — MIRTAZAPINE 15 MG/1
15 TABLET, FILM COATED ORAL NIGHTLY
Status: DISCONTINUED | OUTPATIENT
Start: 2023-12-10 | End: 2023-12-19 | Stop reason: HOSPADM

## 2023-12-10 RX ORDER — MAGNESIUM SULFATE IN WATER 40 MG/ML
2000 INJECTION, SOLUTION INTRAVENOUS PRN
Status: DISCONTINUED | OUTPATIENT
Start: 2023-12-10 | End: 2023-12-19 | Stop reason: HOSPADM

## 2023-12-10 RX ORDER — POTASSIUM CHLORIDE 7.45 MG/ML
10 INJECTION INTRAVENOUS PRN
Status: DISCONTINUED | OUTPATIENT
Start: 2023-12-10 | End: 2023-12-19 | Stop reason: HOSPADM

## 2023-12-10 RX ORDER — ENOXAPARIN SODIUM 100 MG/ML
30 INJECTION SUBCUTANEOUS 2 TIMES DAILY
Status: DISCONTINUED | OUTPATIENT
Start: 2023-12-11 | End: 2023-12-19 | Stop reason: HOSPADM

## 2023-12-10 RX ORDER — SODIUM CHLORIDE 0.9 % (FLUSH) 0.9 %
5-40 SYRINGE (ML) INJECTION EVERY 12 HOURS SCHEDULED
Status: DISCONTINUED | OUTPATIENT
Start: 2023-12-10 | End: 2023-12-19 | Stop reason: HOSPADM

## 2023-12-10 RX ORDER — SODIUM CHLORIDE 0.9 % (FLUSH) 0.9 %
5-40 SYRINGE (ML) INJECTION PRN
Status: DISCONTINUED | OUTPATIENT
Start: 2023-12-10 | End: 2023-12-19 | Stop reason: HOSPADM

## 2023-12-10 RX ORDER — ATORVASTATIN CALCIUM 40 MG/1
40 TABLET, FILM COATED ORAL NIGHTLY
Status: DISCONTINUED | OUTPATIENT
Start: 2023-12-10 | End: 2023-12-19 | Stop reason: HOSPADM

## 2023-12-10 RX ORDER — ALBUTEROL SULFATE 2.5 MG/3ML
2.5 SOLUTION RESPIRATORY (INHALATION) EVERY 4 HOURS PRN
Status: DISCONTINUED | OUTPATIENT
Start: 2023-12-10 | End: 2023-12-12

## 2023-12-10 RX ORDER — PAROXETINE 10 MG/1
30 TABLET, FILM COATED ORAL DAILY
Status: DISCONTINUED | OUTPATIENT
Start: 2023-12-11 | End: 2023-12-19 | Stop reason: HOSPADM

## 2023-12-10 RX ORDER — ASPIRIN 81 MG/1
81 TABLET, CHEWABLE ORAL DAILY
Status: DISCONTINUED | OUTPATIENT
Start: 2023-12-11 | End: 2023-12-19 | Stop reason: HOSPADM

## 2023-12-10 RX ORDER — HYDRALAZINE HYDROCHLORIDE 20 MG/ML
10 INJECTION INTRAMUSCULAR; INTRAVENOUS EVERY 6 HOURS PRN
Status: DISCONTINUED | OUTPATIENT
Start: 2023-12-10 | End: 2023-12-19 | Stop reason: HOSPADM

## 2023-12-10 RX ORDER — DOXEPIN HYDROCHLORIDE 50 MG/1
100 CAPSULE ORAL NIGHTLY
Status: DISCONTINUED | OUTPATIENT
Start: 2023-12-10 | End: 2023-12-19 | Stop reason: HOSPADM

## 2023-12-10 RX ORDER — CALCIUM CARBONATE 500 MG/1
500 TABLET, CHEWABLE ORAL 3 TIMES DAILY PRN
Status: DISCONTINUED | OUTPATIENT
Start: 2023-12-10 | End: 2023-12-19 | Stop reason: HOSPADM

## 2023-12-10 RX ORDER — CLONAZEPAM 0.5 MG/1
1 TABLET ORAL 2 TIMES DAILY
Status: DISCONTINUED | OUTPATIENT
Start: 2023-12-11 | End: 2023-12-19 | Stop reason: HOSPADM

## 2023-12-10 RX ORDER — ONDANSETRON 2 MG/ML
4 INJECTION INTRAMUSCULAR; INTRAVENOUS EVERY 6 HOURS PRN
Status: DISCONTINUED | OUTPATIENT
Start: 2023-12-10 | End: 2023-12-11

## 2023-12-10 RX ORDER — ONDANSETRON 4 MG/1
4 TABLET, ORALLY DISINTEGRATING ORAL EVERY 8 HOURS PRN
Status: DISCONTINUED | OUTPATIENT
Start: 2023-12-10 | End: 2023-12-11

## 2023-12-10 RX ORDER — BUDESONIDE 0.5 MG/2ML
1000 INHALANT ORAL
Status: DISCONTINUED | OUTPATIENT
Start: 2023-12-10 | End: 2023-12-19 | Stop reason: HOSPADM

## 2023-12-10 RX ORDER — CLOPIDOGREL BISULFATE 75 MG/1
75 TABLET ORAL DAILY
Status: DISCONTINUED | OUTPATIENT
Start: 2023-12-11 | End: 2023-12-19 | Stop reason: HOSPADM

## 2023-12-10 RX ORDER — FUROSEMIDE 20 MG/1
20 TABLET ORAL DAILY
Status: DISCONTINUED | OUTPATIENT
Start: 2023-12-11 | End: 2023-12-19 | Stop reason: HOSPADM

## 2023-12-10 RX ORDER — POTASSIUM CHLORIDE 20 MEQ/1
40 TABLET, EXTENDED RELEASE ORAL PRN
Status: DISCONTINUED | OUTPATIENT
Start: 2023-12-10 | End: 2023-12-19 | Stop reason: HOSPADM

## 2023-12-10 RX ORDER — ACETAMINOPHEN 325 MG/1
650 TABLET ORAL EVERY 6 HOURS PRN
Status: DISCONTINUED | OUTPATIENT
Start: 2023-12-10 | End: 2023-12-19 | Stop reason: HOSPADM

## 2023-12-10 RX ORDER — PANTOPRAZOLE SODIUM 40 MG/1
40 TABLET, DELAYED RELEASE ORAL
Status: DISCONTINUED | OUTPATIENT
Start: 2023-12-11 | End: 2023-12-19 | Stop reason: HOSPADM

## 2023-12-10 RX ORDER — ACETAMINOPHEN 650 MG/1
650 SUPPOSITORY RECTAL EVERY 6 HOURS PRN
Status: DISCONTINUED | OUTPATIENT
Start: 2023-12-10 | End: 2023-12-19 | Stop reason: HOSPADM

## 2023-12-10 RX ORDER — LISINOPRIL 10 MG/1
20 TABLET ORAL DAILY
Status: DISCONTINUED | OUTPATIENT
Start: 2023-12-11 | End: 2023-12-15

## 2023-12-10 RX ORDER — FENTANYL CITRATE 50 UG/ML
50 INJECTION, SOLUTION INTRAMUSCULAR; INTRAVENOUS ONCE
Status: COMPLETED | OUTPATIENT
Start: 2023-12-10 | End: 2023-12-10

## 2023-12-10 RX ORDER — EZETIMIBE 10 MG/1
10 TABLET ORAL DAILY
Status: DISCONTINUED | OUTPATIENT
Start: 2023-12-11 | End: 2023-12-19 | Stop reason: HOSPADM

## 2023-12-10 RX ORDER — POLYETHYLENE GLYCOL 3350 17 G/17G
17 POWDER, FOR SOLUTION ORAL DAILY PRN
Status: DISCONTINUED | OUTPATIENT
Start: 2023-12-10 | End: 2023-12-19 | Stop reason: HOSPADM

## 2023-12-10 RX ORDER — ASCORBIC ACID 500 MG
500 TABLET ORAL DAILY
Status: DISCONTINUED | OUTPATIENT
Start: 2023-12-11 | End: 2023-12-19 | Stop reason: HOSPADM

## 2023-12-10 RX ADMIN — IOPAMIDOL 60 ML: 755 INJECTION, SOLUTION INTRAVENOUS at 17:32

## 2023-12-10 RX ADMIN — FENTANYL CITRATE 50 MCG: 50 INJECTION INTRAMUSCULAR; INTRAVENOUS at 20:26

## 2023-12-10 NOTE — ED NOTES
Patient grabbing at her chest stating she is have a weird feeling while in CT.  EKG done and Dr Alexa Yee notified on return to the department     [place], [place]  12/10/23 3547

## 2023-12-11 PROBLEM — N17.9 AKI (ACUTE KIDNEY INJURY) (HCC): Status: ACTIVE | Noted: 2023-12-11

## 2023-12-11 PROBLEM — N30.01 ACUTE CYSTITIS WITH HEMATURIA: Status: ACTIVE | Noted: 2023-12-11

## 2023-12-11 PROBLEM — N30.00 ACUTE CYSTITIS WITHOUT HEMATURIA: Status: ACTIVE | Noted: 2023-12-11

## 2023-12-11 PROBLEM — G45.9 TIA (TRANSIENT ISCHEMIC ATTACK): Status: ACTIVE | Noted: 2023-12-11

## 2023-12-11 LAB
ALBUMIN SERPL-MCNC: 3.5 G/DL (ref 3.5–5.2)
ALP SERPL-CCNC: 98 U/L (ref 35–104)
ALT SERPL-CCNC: 10 U/L (ref 0–32)
ANION GAP SERPL CALCULATED.3IONS-SCNC: 16 MMOL/L (ref 7–16)
AST SERPL-CCNC: 47 U/L (ref 0–31)
BILIRUB SERPL-MCNC: 0.7 MG/DL (ref 0–1.2)
BUN SERPL-MCNC: 16 MG/DL (ref 6–23)
CALCIUM SERPL-MCNC: 8.9 MG/DL (ref 8.6–10.2)
CHLORIDE SERPL-SCNC: 99 MMOL/L (ref 98–107)
CHOLEST SERPL-MCNC: 178 MG/DL
CO2 SERPL-SCNC: 25 MMOL/L (ref 22–29)
CREAT SERPL-MCNC: 1.1 MG/DL (ref 0.5–1)
EKG ATRIAL RATE: 100 BPM
EKG P AXIS: 56 DEGREES
EKG P-R INTERVAL: 174 MS
EKG Q-T INTERVAL: 340 MS
EKG QRS DURATION: 82 MS
EKG QTC CALCULATION (BAZETT): 438 MS
EKG R AXIS: 48 DEGREES
EKG T AXIS: 17 DEGREES
EKG VENTRICULAR RATE: 100 BPM
ERYTHROCYTE [DISTWIDTH] IN BLOOD BY AUTOMATED COUNT: 12.3 % (ref 11.5–15)
GFR SERPL CREATININE-BSD FRML MDRD: 52 ML/MIN/1.73M2
GLUCOSE SERPL-MCNC: 109 MG/DL (ref 74–99)
HBA1C MFR BLD: 5.4 % (ref 4–5.6)
HCT VFR BLD AUTO: 37.2 % (ref 34–48)
HDLC SERPL-MCNC: 48 MG/DL
HGB BLD-MCNC: 11.8 G/DL (ref 11.5–15.5)
LDLC SERPL CALC-MCNC: 105 MG/DL
MAGNESIUM SERPL-MCNC: 1.9 MG/DL (ref 1.6–2.6)
MCH RBC QN AUTO: 31 PG (ref 26–35)
MCHC RBC AUTO-ENTMCNC: 31.7 G/DL (ref 32–34.5)
MCV RBC AUTO: 97.6 FL (ref 80–99.9)
PHOSPHATE SERPL-MCNC: 2.6 MG/DL (ref 2.5–4.5)
PLATELET # BLD AUTO: 229 K/UL (ref 130–450)
PMV BLD AUTO: 10.5 FL (ref 7–12)
POTASSIUM SERPL-SCNC: 3.5 MMOL/L (ref 3.5–5)
PROT SERPL-MCNC: 6.7 G/DL (ref 6.4–8.3)
RBC # BLD AUTO: 3.81 M/UL (ref 3.5–5.5)
SODIUM SERPL-SCNC: 140 MMOL/L (ref 132–146)
TRIGL SERPL-MCNC: 126 MG/DL
TROPONIN I SERPL HS-MCNC: 30 NG/L (ref 0–9)
TSH SERPL DL<=0.05 MIU/L-ACNC: 0.64 UIU/ML (ref 0.27–4.2)
VLDLC SERPL CALC-MCNC: 25 MG/DL
WBC OTHER # BLD: 15.9 K/UL (ref 4.5–11.5)

## 2023-12-11 PROCEDURE — 6360000002 HC RX W HCPCS: Performed by: INTERNAL MEDICINE

## 2023-12-11 PROCEDURE — 87040 BLOOD CULTURE FOR BACTERIA: CPT

## 2023-12-11 PROCEDURE — 2700000000 HC OXYGEN THERAPY PER DAY

## 2023-12-11 PROCEDURE — 2580000003 HC RX 258: Performed by: INTERNAL MEDICINE

## 2023-12-11 PROCEDURE — 84443 ASSAY THYROID STIM HORMONE: CPT

## 2023-12-11 PROCEDURE — 84100 ASSAY OF PHOSPHORUS: CPT

## 2023-12-11 PROCEDURE — 94664 DEMO&/EVAL PT USE INHALER: CPT

## 2023-12-11 PROCEDURE — 6370000000 HC RX 637 (ALT 250 FOR IP): Performed by: INTERNAL MEDICINE

## 2023-12-11 PROCEDURE — 94640 AIRWAY INHALATION TREATMENT: CPT

## 2023-12-11 PROCEDURE — 6360000002 HC RX W HCPCS

## 2023-12-11 PROCEDURE — 36415 COLL VENOUS BLD VENIPUNCTURE: CPT

## 2023-12-11 PROCEDURE — 80053 COMPREHEN METABOLIC PANEL: CPT

## 2023-12-11 PROCEDURE — 80061 LIPID PANEL: CPT

## 2023-12-11 PROCEDURE — 2580000003 HC RX 258

## 2023-12-11 PROCEDURE — 6370000000 HC RX 637 (ALT 250 FOR IP): Performed by: STUDENT IN AN ORGANIZED HEALTH CARE EDUCATION/TRAINING PROGRAM

## 2023-12-11 PROCEDURE — 99232 SBSQ HOSP IP/OBS MODERATE 35: CPT | Performed by: STUDENT IN AN ORGANIZED HEALTH CARE EDUCATION/TRAINING PROGRAM

## 2023-12-11 PROCEDURE — 83036 HEMOGLOBIN GLYCOSYLATED A1C: CPT

## 2023-12-11 PROCEDURE — 2060000000 HC ICU INTERMEDIATE R&B

## 2023-12-11 PROCEDURE — 85027 COMPLETE CBC AUTOMATED: CPT

## 2023-12-11 PROCEDURE — 93010 ELECTROCARDIOGRAM REPORT: CPT | Performed by: INTERNAL MEDICINE

## 2023-12-11 PROCEDURE — 83735 ASSAY OF MAGNESIUM: CPT

## 2023-12-11 RX ORDER — LOPERAMIDE HYDROCHLORIDE 2 MG/1
2 CAPSULE ORAL ONCE
Status: COMPLETED | OUTPATIENT
Start: 2023-12-11 | End: 2023-12-11

## 2023-12-11 RX ORDER — PLECANATIDE 3 MG/1
3 TABLET ORAL DAILY
COMMUNITY
End: 2023-12-11

## 2023-12-11 RX ORDER — PROCHLORPERAZINE EDISYLATE 5 MG/ML
10 INJECTION INTRAMUSCULAR; INTRAVENOUS EVERY 6 HOURS PRN
Status: DISCONTINUED | OUTPATIENT
Start: 2023-12-11 | End: 2023-12-19 | Stop reason: HOSPADM

## 2023-12-11 RX ORDER — OXYCODONE HYDROCHLORIDE AND ACETAMINOPHEN 5; 325 MG/1; MG/1
1 TABLET ORAL EVERY 4 HOURS PRN
Status: DISCONTINUED | OUTPATIENT
Start: 2023-12-11 | End: 2023-12-19 | Stop reason: HOSPADM

## 2023-12-11 RX ADMIN — Medication 10 ML: at 02:13

## 2023-12-11 RX ADMIN — ATORVASTATIN CALCIUM 40 MG: 40 TABLET, FILM COATED ORAL at 21:22

## 2023-12-11 RX ADMIN — DOXEPIN HYDROCHLORIDE 100 MG: 50 CAPSULE ORAL at 21:27

## 2023-12-11 RX ADMIN — OXYCODONE AND ACETAMINOPHEN 1 TABLET: 5; 325 TABLET ORAL at 09:35

## 2023-12-11 RX ADMIN — Medication 500 MG: at 09:34

## 2023-12-11 RX ADMIN — ATORVASTATIN CALCIUM 40 MG: 40 TABLET, FILM COATED ORAL at 01:57

## 2023-12-11 RX ADMIN — METOPROLOL TARTRATE 25 MG: 25 TABLET, FILM COATED ORAL at 21:22

## 2023-12-11 RX ADMIN — PANTOPRAZOLE SODIUM 40 MG: 40 TABLET, DELAYED RELEASE ORAL at 13:23

## 2023-12-11 RX ADMIN — BUDESONIDE INHALATION 1000 MCG: 0.5 SUSPENSION RESPIRATORY (INHALATION) at 20:00

## 2023-12-11 RX ADMIN — METOPROLOL TARTRATE 25 MG: 25 TABLET, FILM COATED ORAL at 09:35

## 2023-12-11 RX ADMIN — SODIUM CHLORIDE: 9 INJECTION, SOLUTION INTRAVENOUS at 17:37

## 2023-12-11 RX ADMIN — MIRTAZAPINE 15 MG: 15 TABLET, FILM COATED ORAL at 21:22

## 2023-12-11 RX ADMIN — CLONAZEPAM 1 MG: 0.5 TABLET ORAL at 18:50

## 2023-12-11 RX ADMIN — LOPERAMIDE HYDROCHLORIDE 2 MG: 2 CAPSULE ORAL at 15:00

## 2023-12-11 RX ADMIN — ACETAMINOPHEN 650 MG: 325 TABLET ORAL at 18:49

## 2023-12-11 RX ADMIN — Medication 10 ML: at 21:22

## 2023-12-11 RX ADMIN — PROCHLORPERAZINE EDISYLATE 10 MG: 5 INJECTION INTRAMUSCULAR; INTRAVENOUS at 17:37

## 2023-12-11 RX ADMIN — BUDESONIDE INHALATION 1000 MCG: 0.5 SUSPENSION RESPIRATORY (INHALATION) at 09:01

## 2023-12-11 RX ADMIN — SODIUM CHLORIDE: 9 INJECTION, SOLUTION INTRAVENOUS at 02:06

## 2023-12-11 RX ADMIN — CLONAZEPAM 1 MG: 0.5 TABLET ORAL at 09:34

## 2023-12-11 RX ADMIN — ENOXAPARIN SODIUM 30 MG: 100 INJECTION SUBCUTANEOUS at 09:35

## 2023-12-11 RX ADMIN — WATER 1000 MG: 1 INJECTION INTRAMUSCULAR; INTRAVENOUS; SUBCUTANEOUS at 22:58

## 2023-12-11 RX ADMIN — CEFTRIAXONE SODIUM 2000 MG: 2 INJECTION, POWDER, FOR SOLUTION INTRAMUSCULAR; INTRAVENOUS at 01:59

## 2023-12-11 RX ADMIN — MIRTAZAPINE 15 MG: 15 TABLET, FILM COATED ORAL at 01:57

## 2023-12-11 RX ADMIN — ACETAMINOPHEN 650 MG: 325 TABLET ORAL at 01:56

## 2023-12-11 RX ADMIN — PAROXETINE 30 MG: 10 TABLET, FILM COATED ORAL at 13:23

## 2023-12-11 RX ADMIN — ASPIRIN 81 MG CHEWABLE TABLET 81 MG: 81 TABLET CHEWABLE at 09:34

## 2023-12-11 RX ADMIN — LISINOPRIL 20 MG: 20 TABLET ORAL at 09:34

## 2023-12-11 RX ADMIN — ENOXAPARIN SODIUM 30 MG: 100 INJECTION SUBCUTANEOUS at 21:22

## 2023-12-11 RX ADMIN — CLOPIDOGREL BISULFATE 75 MG: 75 TABLET ORAL at 09:34

## 2023-12-11 RX ADMIN — FUROSEMIDE 20 MG: 40 TABLET ORAL at 09:34

## 2023-12-11 RX ADMIN — EZETIMIBE 10 MG: 10 TABLET ORAL at 13:23

## 2023-12-11 NOTE — ED PROVIDER NOTES
Department of Emergency Medicine     Written by: Jeniffer Burris MD  Patient Name: Delgado Guzman Date: 12/10/2023  4:39 PM  MRN: [id number]                   : 1945      HPI  Chief Complaint   Patient presents with    Fatigue     Patient c/o generalized weakness and not feeling well , per EMS family concerned for UTI     Patient is a 69year old female PMH COPD, HTN, cerebral aneurysm, CAD, and previous TIAs who presents to the ED with weakness and stroke-like symptoms. The patient's daughter is present at bedside and she and the patient provided the history. Patient's daughter states that the patient previously resided in a nursing home and was brought home with her daughter as primary caregiver. Her daughter states that for the last week, the mother has been having increasing weakness and confusion. She states that three hours prior to presentation, the patient developed severe truncal ataxia, right-sided facial droop, dysarthria, and confusion. She states she fell and then hit her head but no LOC. The symptoms resolved half an hour later and upon evaluation patient had truncal ataxia with generalized weakness. The patient's daughter states that she is unable to care for her at home and is requesting admission for placement. She states that she is also concerned the patient has a UTI as she has been urinating more and having foul smelling urine. Review of systems:    Pertinent positives and negatives mentioned in the HPI/MDM. Physical Exam  Constitutional:       Appearance: She is ill-appearing. HENT:      Head: Normocephalic and atraumatic. Mouth/Throat:      Mouth: Mucous membranes are moist.      Pharynx: Oropharynx is clear. Eyes:      Extraocular Movements: Extraocular movements intact. Conjunctiva/sclera: Conjunctivae normal.      Pupils: Pupils are equal, round, and reactive to light. Cardiovascular:      Rate and Rhythm: Normal rate and regular rhythm.       Pulses: segments of the left   MCA. Severe stenosis in the proximal V4 segment of the right vertebral artery. 70% stenosis in the mid V4 segment of the left vertebral artery. 30% stenosis in the proximal right internal carotid artery. CTA NECK W CONTRAST   Final Result   Moderate to severe stenosis at the junction of M1 and M2 segments of the left   MCA. Severe stenosis in the proximal V4 segment of the right vertebral artery. 70% stenosis in the mid V4 segment of the left vertebral artery. 30% stenosis in the proximal right internal carotid artery. CT Head W/O Contrast   Final Result   No acute intracranial abnormality. Preliminary report given to Dr. Hollie Lentz the time of   the interpretation of the study. CT CSpine W/O Contrast   Final Result   No acute fracture is identified.          MRI BRAIN WO CONTRAST    (Results Pending)       Medications administered:  Medications   0.9 % sodium chloride infusion ( IntraVENous New Bag 12/11/23 0206)   sodium chloride flush 0.9 % injection 5-40 mL ( IntraVENous Not Given 12/11/23 0956)   sodium chloride flush 0.9 % injection 5-40 mL (has no administration in time range)   0.9 % sodium chloride infusion (has no administration in time range)   potassium chloride (KLOR-CON M) extended release tablet 40 mEq (has no administration in time range)     Or   potassium bicarb-citric acid (EFFER-K) effervescent tablet 40 mEq (has no administration in time range)     Or   potassium chloride 10 mEq/100 mL IVPB (Peripheral Line) (has no administration in time range)   magnesium sulfate 2000 mg in 50 mL IVPB premix (has no administration in time range)   enoxaparin Sodium (LOVENOX) injection 30 mg (30 mg SubCUTAneous Given 12/11/23 0935)   polyethylene glycol (GLYCOLAX) packet 17 g (has no administration in time range)   acetaminophen (TYLENOL) tablet 650 mg (650 mg Oral Given 12/11/23 0156)     Or   acetaminophen

## 2023-12-11 NOTE — PROGRESS NOTES
4 Eyes Skin Assessment     NAME:  Natalio Coffman  YOB: 1945    NAME:  Natalio Coffman  YOB: 1945  MEDICAL RECORD NUMBER:  [id number]    The patient is being assessed for  Admission    I agree that at least one RN has performed a thorough Head to Toe Skin Assessment on the patient. ALL assessment sites listed below have been assessed. Areas assessed by both nurses:    Sacrum. Buttock, Coccyx, Ischium and Legs. Feet and Heels        Does the Patient have a Wound?  No noted wound(s)       Mark Prevention initiated by RN: Yes  Wound Care Orders initiated by RN: No    Pressure Injury (Stage 3,4, Unstageable, DTI, NWPT, and Complex wounds) if present, place Wound referral order by RN under : No    New Ostomies, if present place, Ostomy referral order under : No     Nurse 1 eSignature: Electronically signed by Lorin Luther RN on 12/11/23 at 6:17 PM EST    **SHARE this note so that the co-signing nurse can place an eSignature**    Nurse 2 eSignature: Electronically signed by Daphne Esposito RN on 12/11/23 at 6:31 PM EST

## 2023-12-11 NOTE — ED NOTES
Assumed care of patient from Dr. Darion Montejo. Please refer to Dr. Thom García note for full history and physical.  Presented with stroke like symptoms. Patient has already had CT scans per stroke workup and even offered TNK by neurology/neurosurgery. Pending at this time is UA, repeat troponin. Plan to admit to medicine for further management and eventual placement in a nursing facility as daughter can no longer take care of her at home. Course under my care:  UA showed concern for UTI, given ceftriaxone. Initial troponin was 26, repeat obtained several hours later was 30. Patient admitted to the hospital for further management. ED Course as of 12/11/23 0456   Sun Dec 10, 2023   1722 Last known well 3 PM.  NIH stroke scale 1 for right sided facial droop. She does have truncal ataxia on exam, per daughter this is been ongoing for many weeks but worsened today [KP]   1744 EKG: This EKG is signed and interpreted by ED Physician. Time:  1732   Rate: 100  Rhythm: Sinus. Interpretation: non-specific EKG. normal axis. No STEMI. Nonspecific T wave inversions lead II, V3, V5  Comparison: Changes compared to     [KP]   3710 Spoke with neurology. Patient inside the window of TNK. Dr. Gregg Nicolas came to the ER to discuss it with patient and family. After shared decision-making, they discussed no TNK, no endovascular therapy [KP]   2112 Patient signed out to me. Patient presented to stroke like symptoms. Stroke alert called. After evaluation and imaging as well as discussion with neurology, TNK is not an option. Plan for admission to the hospital which sounds primarily like and will be for placement as the patient's daughter can no longer care for her any longer at home. At this time, she is pending a repeat troponin, urinalysis, and then will be admitted to medicine. PCP is Dr. Danish Alvarez. [KS]   4409 Spoke with Dr. Dai Rangel, discussed case, will admit.  [KP]      ED Course User Index  [KP] Annette Laura Locke  [KS] Stephanie John MD         ICD-10-CM    1. TIA (transient ischemic attack)  G45.9       2. Acute cystitis with hematuria  N30.01       3.  Stroke-like symptoms  R29.90           Flaca Jessica MD  4:56 AM  12/11/2023       Stephanie John MD  Resident  12/11/23 0728

## 2023-12-11 NOTE — H&P
Inpatient H&P      PCP:  Karen Casanova DO  Admitting Physician:  No admitting provider for patient encounter. Consultants:  None at this time   Chief Complaint:    Chief Complaint   Patient presents with    Fatigue     Patient c/o generalized weakness and not feeling well , per EMS family concerned for UTI       History of Present Illness  Joselyn English is a 69 y.o. female who presents to [facility] ER complaining of fatigue. Joselyn English has a past medical history that includes CAD, COPD, hypertension, aortic stenosis s/p AVR    Willard Jensen presents to the ER with complaint of generalized weakness and not feeling well, along with dizziness and ataxia. Family also has concern for possible facial droop and unilateral weakness. Willard Jensen states that she feels like she did have alternating unilateral weakness. She does have a history of severe cranial atherosclerosis but she follows with Dr. Yvon Phillips. She also has a history of cerebral aneurysm. She is found to have a UTI in the ER. Stroke alert was called and CTA showed moderate to severe stenosis at the junction of M1 and M2 segments of the left MCA, severe stenosis in proximal V4 segment of the right vertebral artery, 70% stenosis of mid V4 segment of the left vertebral artery, 30% stenosis in the proximal right internal carotid artery  Discussed patient's case with ED physician. ER Course  Upon presentation to the ER, routine labwork was performed which revealed WBC 14.9, troponin 26, creatinine 1.2. Imaging results are as outlined below in the Imaging section of this note. Upon arrival to the ER, patient was 156/90. The patient received Rocephin, fentanyl in the emergency room and was admitted to [facility].     Last Hospital Admission - 4/9/23  Acute CVA  Ataxia  Intracranial aneurysm    Last Echocardiogram - 6/8/17   Bioprosthetic aortic valve - normal function   Normal left ventricle size   Mild concentric left ventricular hypertrophy   Septal Additional work up and treatment should be done by my colleague hospitalist who assumes care.        Jenny Adam DO    11:13 PM  12/10/2023

## 2023-12-11 NOTE — CARE COORDINATION
Social Work /Transition of Care:    Pt presents to the ED secondary to fatigue and generalized weakness from home. Pt is admitted inpatient with stroke like symptoms. FRANKO met with pt's daughter, Paulo Elliott. Pt's daughter reports she and pt live in a 2 story home and pt is unable to get to the second floor at this time. Pt is currently on 4L oxygen and has home oxygen (4L,[employer]). Pt's daughter requesting referral to [address] [place] [address] as pt has been there in the past.  FRANKO made referral to Kathy Rothman at [address] [place] [address] and she will follow. Pt will need PT/OT evals and insurance authorization prior to discharge. FRANKO/CM to follow.

## 2023-12-11 NOTE — ED NOTES
Pt's Iv would not draw for repeat troponin, attempted a straight stick and failed. Pt's daughter stated \"That's it! Nobody's sticking her again. \"     Willie Orlando RN  12/10/23 5507

## 2023-12-12 ENCOUNTER — APPOINTMENT (OUTPATIENT)
Age: 78
DRG: 720 | End: 2023-12-12
Attending: INTERNAL MEDICINE
Payer: COMMERCIAL

## 2023-12-12 ENCOUNTER — APPOINTMENT (OUTPATIENT)
Dept: GENERAL RADIOLOGY | Age: 78
DRG: 720 | End: 2023-12-12
Payer: COMMERCIAL

## 2023-12-12 LAB
AADO2: 373.5 MMHG
ALBUMIN SERPL-MCNC: 3.2 G/DL (ref 3.5–5.2)
ALP SERPL-CCNC: 92 U/L (ref 35–104)
ALT SERPL-CCNC: 12 U/L (ref 0–32)
ANION GAP SERPL CALCULATED.3IONS-SCNC: 12 MMOL/L (ref 7–16)
AST SERPL-CCNC: 30 U/L (ref 0–31)
B PARAP IS1001 DNA NPH QL NAA+NON-PROBE: NOT DETECTED
B PERT DNA SPEC QL NAA+PROBE: NOT DETECTED
B.E.: -0.2 MMOL/L (ref -3–3)
B.E.: 0.6 MMOL/L (ref -3–3)
BASOPHILS # BLD: 0.08 K/UL (ref 0–0.2)
BASOPHILS NFR BLD: 1 % (ref 0–2)
BILIRUB SERPL-MCNC: 0.4 MG/DL (ref 0–1.2)
BNP SERPL-MCNC: 2944 PG/ML (ref 0–450)
BUN SERPL-MCNC: 13 MG/DL (ref 6–23)
C PNEUM DNA NPH QL NAA+NON-PROBE: NOT DETECTED
CALCIUM SERPL-MCNC: 8.4 MG/DL (ref 8.6–10.2)
CHLORIDE SERPL-SCNC: 103 MMOL/L (ref 98–107)
CK SERPL-CCNC: 1545 U/L (ref 20–180)
CO2 SERPL-SCNC: 22 MMOL/L (ref 22–29)
COHB: 0.3 % (ref 0–1.5)
COHB: 0.7 % (ref 0–1.5)
CREAT SERPL-MCNC: 1.1 MG/DL (ref 0.5–1)
CRITICAL: ABNORMAL
CRITICAL: ABNORMAL
CRP SERPL HS-MCNC: 128 MG/L (ref 0–5)
DATE ANALYZED: ABNORMAL
DATE ANALYZED: ABNORMAL
DATE OF COLLECTION: ABNORMAL
DATE OF COLLECTION: ABNORMAL
EOSINOPHIL # BLD: 0.1 K/UL (ref 0.05–0.5)
EOSINOPHILS RELATIVE PERCENT: 1 % (ref 0–6)
ERYTHROCYTE [DISTWIDTH] IN BLOOD BY AUTOMATED COUNT: 12.5 % (ref 11.5–15)
ERYTHROCYTE [SEDIMENTATION RATE] IN BLOOD BY WESTERGREN METHOD: 77 MM/HR (ref 0–20)
FIO2: 100 %
FLUAV RNA NPH QL NAA+NON-PROBE: NOT DETECTED
FLUBV RNA NPH QL NAA+NON-PROBE: NOT DETECTED
GFR SERPL CREATININE-BSD FRML MDRD: 51 ML/MIN/1.73M2
GLUCOSE BLD-MCNC: 108 MG/DL (ref 74–99)
GLUCOSE BLD-MCNC: 117 MG/DL (ref 74–99)
GLUCOSE SERPL-MCNC: 143 MG/DL (ref 74–99)
HADV DNA NPH QL NAA+NON-PROBE: NOT DETECTED
HCO3: 24 MMOL/L (ref 22–26)
HCO3: 25.5 MMOL/L (ref 22–26)
HCOV 229E RNA NPH QL NAA+NON-PROBE: NOT DETECTED
HCOV HKU1 RNA NPH QL NAA+NON-PROBE: NOT DETECTED
HCOV NL63 RNA NPH QL NAA+NON-PROBE: NOT DETECTED
HCOV OC43 RNA NPH QL NAA+NON-PROBE: NOT DETECTED
HCT VFR BLD AUTO: 35.5 % (ref 34–48)
HGB BLD-MCNC: 11.4 G/DL (ref 11.5–15.5)
HHB: 0.7 % (ref 0–5)
HHB: 4 % (ref 0–5)
HMPV RNA NPH QL NAA+NON-PROBE: NOT DETECTED
HPIV1 RNA NPH QL NAA+NON-PROBE: NOT DETECTED
HPIV2 RNA NPH QL NAA+NON-PROBE: NOT DETECTED
HPIV3 RNA NPH QL NAA+NON-PROBE: NOT DETECTED
HPIV4 RNA NPH QL NAA+NON-PROBE: NOT DETECTED
IMM GRANULOCYTES # BLD AUTO: 0.06 K/UL (ref 0–0.58)
IMM GRANULOCYTES NFR BLD: 1 % (ref 0–5)
LAB: ABNORMAL
LAB: ABNORMAL
LACTATE BLDV-SCNC: 1.3 MMOL/L (ref 0.5–2.2)
LYMPHOCYTES NFR BLD: 0.45 K/UL (ref 1.5–4)
LYMPHOCYTES RELATIVE PERCENT: 4 % (ref 20–42)
Lab: 1620
Lab: 2245
M PNEUMO DNA NPH QL NAA+NON-PROBE: NOT DETECTED
MAGNESIUM SERPL-MCNC: 1.4 MG/DL (ref 1.6–2.6)
MCH RBC QN AUTO: 30.6 PG (ref 26–35)
MCHC RBC AUTO-ENTMCNC: 32.1 G/DL (ref 32–34.5)
MCV RBC AUTO: 95.2 FL (ref 80–99.9)
METHB: 0.3 % (ref 0–1.5)
METHB: 0.5 % (ref 0–1.5)
MODE: ABNORMAL
MODE: ABNORMAL
MONOCYTES NFR BLD: 0.69 K/UL (ref 0.1–0.95)
MONOCYTES NFR BLD: 6 % (ref 2–12)
NEUTROPHILS NFR BLD: 89 % (ref 43–80)
NEUTS SEG NFR BLD: 10.91 K/UL (ref 1.8–7.3)
O2 CONTENT: 15.5 ML/DL
O2 CONTENT: 16.8 ML/DL
O2 SATURATION: 96 % (ref 92–98.5)
O2 SATURATION: 99.3 % (ref 92–98.5)
O2HB: 95 % (ref 94–97)
O2HB: 98.5 % (ref 94–97)
OPERATOR ID: 1768
OPERATOR ID: 5323
PATIENT TEMP: 37 C
PATIENT TEMP: 37 C
PCO2: 34 MMHG (ref 35–45)
PCO2: 45.6 MMHG (ref 35–45)
PEEP/CPAP: 8 CMH2O
PFO2: 2.81 MMHG/%
PH BLOOD GAS: 7.37 (ref 7.35–7.45)
PH BLOOD GAS: 7.47 (ref 7.35–7.45)
PHOSPHATE SERPL-MCNC: 1.8 MG/DL (ref 2.5–4.5)
PLATELET # BLD AUTO: 271 K/UL (ref 130–450)
PMV BLD AUTO: 10.5 FL (ref 7–12)
PO2: 280.5 MMHG (ref 75–100)
PO2: 79.1 MMHG (ref 75–100)
POTASSIUM SERPL-SCNC: 3.4 MMOL/L (ref 3.5–5)
POTASSIUM SERPL-SCNC: 3.42 MMOL/L (ref 3.5–5)
PROCALCITONIN SERPL-MCNC: 0.26 NG/ML (ref 0–0.08)
PROT SERPL-MCNC: 6 G/DL (ref 6.4–8.3)
PS: 12 CMH20
RBC # BLD AUTO: 3.73 M/UL (ref 3.5–5.5)
RBC # BLD: ABNORMAL 10*6/UL
RI(T): 1.33
RSV RNA NPH QL NAA+NON-PROBE: NOT DETECTED
RV+EV RNA NPH QL NAA+NON-PROBE: NOT DETECTED
SARS-COV-2 RNA NPH QL NAA+NON-PROBE: NOT DETECTED
SODIUM SERPL-SCNC: 137 MMOL/L (ref 132–146)
SOURCE, BLOOD GAS: ABNORMAL
SOURCE, BLOOD GAS: ABNORMAL
SPECIMEN DESCRIPTION: NORMAL
THB: 10.7 G/DL (ref 11.5–16.5)
THB: 12.5 G/DL (ref 11.5–16.5)
TIME ANALYZED: 1621
TIME ANALYZED: 2251
TROPONIN I SERPL HS-MCNC: 106 NG/L (ref 0–9)
TROPONIN I SERPL HS-MCNC: 59 NG/L (ref 0–9)
WBC OTHER # BLD: 12.3 K/UL (ref 4.5–11.5)

## 2023-12-12 PROCEDURE — 6370000000 HC RX 637 (ALT 250 FOR IP): Performed by: NURSE PRACTITIONER

## 2023-12-12 PROCEDURE — 97535 SELF CARE MNGMENT TRAINING: CPT

## 2023-12-12 PROCEDURE — 6360000002 HC RX W HCPCS: Performed by: INTERNAL MEDICINE

## 2023-12-12 PROCEDURE — 97161 PT EVAL LOW COMPLEX 20 MIN: CPT

## 2023-12-12 PROCEDURE — 36415 COLL VENOUS BLD VENIPUNCTURE: CPT

## 2023-12-12 PROCEDURE — 2700000000 HC OXYGEN THERAPY PER DAY

## 2023-12-12 PROCEDURE — 6360000002 HC RX W HCPCS

## 2023-12-12 PROCEDURE — 85652 RBC SED RATE AUTOMATED: CPT

## 2023-12-12 PROCEDURE — 97530 THERAPEUTIC ACTIVITIES: CPT

## 2023-12-12 PROCEDURE — 99223 1ST HOSP IP/OBS HIGH 75: CPT | Performed by: PSYCHIATRY & NEUROLOGY

## 2023-12-12 PROCEDURE — 99291 CRITICAL CARE FIRST HOUR: CPT | Performed by: SURGERY

## 2023-12-12 PROCEDURE — 84100 ASSAY OF PHOSPHORUS: CPT

## 2023-12-12 PROCEDURE — 83880 ASSAY OF NATRIURETIC PEPTIDE: CPT

## 2023-12-12 PROCEDURE — 99223 1ST HOSP IP/OBS HIGH 75: CPT | Performed by: STUDENT IN AN ORGANIZED HEALTH CARE EDUCATION/TRAINING PROGRAM

## 2023-12-12 PROCEDURE — 6370000000 HC RX 637 (ALT 250 FOR IP): Performed by: STUDENT IN AN ORGANIZED HEALTH CARE EDUCATION/TRAINING PROGRAM

## 2023-12-12 PROCEDURE — 94640 AIRWAY INHALATION TREATMENT: CPT

## 2023-12-12 PROCEDURE — 2500000003 HC RX 250 WO HCPCS

## 2023-12-12 PROCEDURE — 94660 CPAP INITIATION&MGMT: CPT

## 2023-12-12 PROCEDURE — 84484 ASSAY OF TROPONIN QUANT: CPT

## 2023-12-12 PROCEDURE — 97165 OT EVAL LOW COMPLEX 30 MIN: CPT

## 2023-12-12 PROCEDURE — 80053 COMPREHEN METABOLIC PANEL: CPT

## 2023-12-12 PROCEDURE — 82962 GLUCOSE BLOOD TEST: CPT

## 2023-12-12 PROCEDURE — 84145 PROCALCITONIN (PCT): CPT

## 2023-12-12 PROCEDURE — 83735 ASSAY OF MAGNESIUM: CPT

## 2023-12-12 PROCEDURE — 2000000000 HC ICU R&B

## 2023-12-12 PROCEDURE — 2580000003 HC RX 258: Performed by: INTERNAL MEDICINE

## 2023-12-12 PROCEDURE — 86140 C-REACTIVE PROTEIN: CPT

## 2023-12-12 PROCEDURE — 84132 ASSAY OF SERUM POTASSIUM: CPT

## 2023-12-12 PROCEDURE — 71045 X-RAY EXAM CHEST 1 VIEW: CPT

## 2023-12-12 PROCEDURE — 2580000003 HC RX 258

## 2023-12-12 PROCEDURE — 6370000000 HC RX 637 (ALT 250 FOR IP): Performed by: INTERNAL MEDICINE

## 2023-12-12 PROCEDURE — 5A09357 ASSISTANCE WITH RESPIRATORY VENTILATION, LESS THAN 24 CONSECUTIVE HOURS, CONTINUOUS POSITIVE AIRWAY PRESSURE: ICD-10-PCS

## 2023-12-12 PROCEDURE — 0202U NFCT DS 22 TRGT SARS-COV-2: CPT

## 2023-12-12 PROCEDURE — 83605 ASSAY OF LACTIC ACID: CPT

## 2023-12-12 PROCEDURE — 93005 ELECTROCARDIOGRAM TRACING: CPT

## 2023-12-12 PROCEDURE — 85025 COMPLETE CBC W/AUTO DIFF WBC: CPT

## 2023-12-12 PROCEDURE — 82550 ASSAY OF CK (CPK): CPT

## 2023-12-12 PROCEDURE — 82805 BLOOD GASES W/O2 SATURATION: CPT

## 2023-12-12 RX ORDER — BUMETANIDE 0.25 MG/ML
INJECTION INTRAMUSCULAR; INTRAVENOUS
Status: DISPENSED
Start: 2023-12-12 | End: 2023-12-13

## 2023-12-12 RX ORDER — MAGNESIUM SULFATE IN WATER 40 MG/ML
2000 INJECTION, SOLUTION INTRAVENOUS ONCE
Status: COMPLETED | OUTPATIENT
Start: 2023-12-12 | End: 2023-12-12

## 2023-12-12 RX ORDER — BUMETANIDE 0.25 MG/ML
2 INJECTION INTRAMUSCULAR; INTRAVENOUS ONCE
Status: COMPLETED | OUTPATIENT
Start: 2023-12-12 | End: 2023-12-12

## 2023-12-12 RX ORDER — LOPERAMIDE HYDROCHLORIDE 2 MG/1
2 CAPSULE ORAL 4 TIMES DAILY PRN
Status: DISCONTINUED | OUTPATIENT
Start: 2023-12-12 | End: 2023-12-19 | Stop reason: HOSPADM

## 2023-12-12 RX ORDER — POTASSIUM CHLORIDE 7.45 MG/ML
10 INJECTION INTRAVENOUS
Status: COMPLETED | OUTPATIENT
Start: 2023-12-12 | End: 2023-12-12

## 2023-12-12 RX ORDER — IPRATROPIUM BROMIDE AND ALBUTEROL SULFATE 2.5; .5 MG/3ML; MG/3ML
1 SOLUTION RESPIRATORY (INHALATION) EVERY 4 HOURS PRN
Status: DISCONTINUED | OUTPATIENT
Start: 2023-12-12 | End: 2023-12-19 | Stop reason: HOSPADM

## 2023-12-12 RX ADMIN — POTASSIUM CHLORIDE 10 MEQ: 7.46 INJECTION, SOLUTION INTRAVENOUS at 18:36

## 2023-12-12 RX ADMIN — POTASSIUM PHOSPHATE, MONOBASIC AND POTASSIUM PHOSPHATE, DIBASIC 20 MMOL: 224; 236 INJECTION, SOLUTION, CONCENTRATE INTRAVENOUS at 20:15

## 2023-12-12 RX ADMIN — IPRATROPIUM BROMIDE AND ALBUTEROL SULFATE 1 DOSE: 2.5; .5 SOLUTION RESPIRATORY (INHALATION) at 16:40

## 2023-12-12 RX ADMIN — Medication 500 MG: at 08:15

## 2023-12-12 RX ADMIN — Medication 10 ML: at 21:33

## 2023-12-12 RX ADMIN — LISINOPRIL 20 MG: 20 TABLET ORAL at 08:15

## 2023-12-12 RX ADMIN — LOPERAMIDE HYDROCHLORIDE 2 MG: 2 CAPSULE ORAL at 02:29

## 2023-12-12 RX ADMIN — METOPROLOL TARTRATE 25 MG: 25 TABLET, FILM COATED ORAL at 08:16

## 2023-12-12 RX ADMIN — IPRATROPIUM BROMIDE AND ALBUTEROL SULFATE 1 DOSE: 2.5; .5 SOLUTION RESPIRATORY (INHALATION) at 16:41

## 2023-12-12 RX ADMIN — IPRATROPIUM BROMIDE AND ALBUTEROL SULFATE 1 DOSE: 2.5; .5 SOLUTION RESPIRATORY (INHALATION) at 13:19

## 2023-12-12 RX ADMIN — BUDESONIDE INHALATION 1000 MCG: 0.5 SUSPENSION RESPIRATORY (INHALATION) at 20:57

## 2023-12-12 RX ADMIN — FUROSEMIDE 20 MG: 40 TABLET ORAL at 08:16

## 2023-12-12 RX ADMIN — ACETAMINOPHEN 650 MG: 650 SUPPOSITORY RECTAL at 18:19

## 2023-12-12 RX ADMIN — ENOXAPARIN SODIUM 30 MG: 100 INJECTION SUBCUTANEOUS at 08:16

## 2023-12-12 RX ADMIN — POTASSIUM CHLORIDE 10 MEQ: 7.46 INJECTION, SOLUTION INTRAVENOUS at 21:36

## 2023-12-12 RX ADMIN — MAGNESIUM SULFATE HEPTAHYDRATE 2000 MG: 40 INJECTION, SOLUTION INTRAVENOUS at 18:35

## 2023-12-12 RX ADMIN — OXYCODONE AND ACETAMINOPHEN 1 TABLET: 5; 325 TABLET ORAL at 08:22

## 2023-12-12 RX ADMIN — BUMETANIDE 2 MG: 0.25 INJECTION INTRAMUSCULAR; INTRAVENOUS at 16:34

## 2023-12-12 RX ADMIN — OXYCODONE AND ACETAMINOPHEN 1 TABLET: 5; 325 TABLET ORAL at 02:29

## 2023-12-12 RX ADMIN — HYDRALAZINE HYDROCHLORIDE 10 MG: 20 INJECTION INTRAMUSCULAR; INTRAVENOUS at 16:31

## 2023-12-12 RX ADMIN — ASPIRIN 81 MG CHEWABLE TABLET 81 MG: 81 TABLET CHEWABLE at 08:15

## 2023-12-12 RX ADMIN — ENOXAPARIN SODIUM 30 MG: 100 INJECTION SUBCUTANEOUS at 21:50

## 2023-12-12 RX ADMIN — POTASSIUM CHLORIDE 10 MEQ: 7.46 INJECTION, SOLUTION INTRAVENOUS at 23:13

## 2023-12-12 RX ADMIN — CLONAZEPAM 1 MG: 0.5 TABLET ORAL at 08:15

## 2023-12-12 RX ADMIN — PAROXETINE 30 MG: 10 TABLET, FILM COATED ORAL at 08:31

## 2023-12-12 RX ADMIN — CLOPIDOGREL BISULFATE 75 MG: 75 TABLET ORAL at 08:30

## 2023-12-12 RX ADMIN — PANTOPRAZOLE SODIUM 40 MG: 40 TABLET, DELAYED RELEASE ORAL at 05:22

## 2023-12-12 RX ADMIN — BUDESONIDE INHALATION 1000 MCG: 0.5 SUSPENSION RESPIRATORY (INHALATION) at 09:05

## 2023-12-12 RX ADMIN — EZETIMIBE 10 MG: 10 TABLET ORAL at 08:31

## 2023-12-12 NOTE — PROGRESS NOTES
Pharmacy Consultation Note  (Antibiotic Dosing and Monitoring)    Initial consult date: 12/12/23  Consulting physician/provider: Lacey Cooks, MD   Drug: Vancomycin  Indication: Pneumonia (CAP)    Age/  Gender Height Weight IBW  Allergy Information   78 y.o./female 162.6 cm (5' 4\") 104.3 kg (230 lb)     Ideal body weight: 54.7 kg (120 lb 9.5 oz)  Adjusted ideal body weight: 74.9 kg (165 lb 2 oz)   Codeine and Pain patch [menthol]      Renal Function:  Recent Labs     12/10/23  1715 12/11/23  0605   BUN 18 16   CREATININE 1.2* 1.1*       Intake/Output Summary (Last 24 hours) at 12/12/2023 1642  Last data filed at 12/12/2023 0608  Gross per 24 hour   Intake 900 ml   Output --   Net 900 ml       Vancomycin Monitoring:  Trough:  No results for input(s): \"VANCOTROUGH\" in the last 72 hours. Random:  No results for input(s): \"VANCORANDOM\" in the last 72 hours. Vancomycin Administration Times:  Recent vancomycin administrations        No vancomycin IV orders with administrations found. Orders not given:            vancomycin (VANCOCIN) 1500 mg in sodium chloride 0.9 % 250 mL IVPB                    Assessment:  Patient is a 69 y.o. female who has been initiated on vancomycin for pneumonia (CAP)   Estimated Creatinine Clearance: 51 mL/min (A) (based on SCr of 1.1 mg/dL (H)).   To dose vancomycin, pharmacy will be utilizing dosing based off of levels because of patient's renal impairment/insufficiency  12/12: Scr 1.1, potential NINA     Plan:  Vancomycin 1500 mg once   Random level tomorrow AM  Will continue to monitor renal function   Pharmacy to follow      Mignon Whitman, PharmD, [address] [place] [address]  PGY-1 Pharmacy Resident  [phone]   12/12/2023 4:42 PM

## 2023-12-12 NOTE — PLAN OF CARE
Problem: ABCDS Injury Assessment  Goal: Absence of physical injury  Outcome: Progressing     Problem: Safety - Adult  Goal: Free from fall injury  Outcome: Progressing     Problem: Chronic Conditions and Co-morbidities  Goal: Patient's chronic conditions and co-morbidity symptoms are monitored and maintained or improved  Outcome: Progressing     Problem: Discharge Planning  Goal: Discharge to home or other facility with appropriate resources  Outcome: Progressing     Problem: Skin/Tissue Integrity  Goal: Absence of new skin breakdown  Description: 1. Monitor for areas of redness and/or skin breakdown  2. Assess vascular access sites hourly  3. Every 4-6 hours minimum:  Change oxygen saturation probe site  4. Every 4-6 hours:  If on nasal continuous positive airway pressure, respiratory therapy assess nares and determine need for appliance change or resting period.   Outcome: Progressing

## 2023-12-12 NOTE — PLAN OF CARE
Problem: ABCDS Injury Assessment  Goal: Absence of physical injury  12/12/2023 0029 by Susanne Kay RN  Outcome: Progressing  12/12/2023 0023 by Susanne Kay RN  Outcome: Progressing     Problem: Safety - Adult  Goal: Free from fall injury  12/12/2023 0029 by Susanne Kay RN  Outcome: Progressing  12/12/2023 0023 by Susanne Kay RN  Outcome: Progressing     Problem: Chronic Conditions and Co-morbidities  Goal: Patient's chronic conditions and co-morbidity symptoms are monitored and maintained or improved  12/12/2023 0029 by Susanne Kay RN  Outcome: Progressing  12/12/2023 0023 by Susanne Kay RN  Outcome: Progressing     Problem: Discharge Planning  Goal: Discharge to home or other facility with appropriate resources  12/12/2023 0029 by Susanne Kay RN  Outcome: Progressing  12/12/2023 0023 by Susanne Kay RN  Outcome: Progressing     Problem: Skin/Tissue Integrity  Goal: Absence of new skin breakdown  Description: 1. Monitor for areas of redness and/or skin breakdown  2. Assess vascular access sites hourly  3. Every 4-6 hours minimum:  Change oxygen saturation probe site  4. Every 4-6 hours:  If on nasal continuous positive airway pressure, respiratory therapy assess nares and determine need for appliance change or resting period.   12/12/2023 0029 by Susanne Kay RN  Outcome: Progressing  12/12/2023 0023 by Susanne Kay RN  Outcome: Progressing

## 2023-12-12 NOTE — PROGRESS NOTES
Physical Therapy  Physical Therapy Initial Assessment    Name: Mg Gutierrez  :   MRN: [id number]      Date of Service: 2023    Evaluating PT:  Mirianjoann Valdivia, PT, DPT [id number]    Room #:  7154/9105-E  Diagnosis:  TIA (transient ischemic attack) [G45.9]  Acute cystitis with hematuria [N30.01]  Stroke-like symptoms [R29.90]  PMHx/PSHx:   has a past medical history of Anxiety and depression, Arthritis, Asthma, Borderline diabetes, CAD (coronary artery disease), CHF (congestive heart failure) ([address]), Chronic back pain, COPD (chronic obstructive pulmonary disease) ([address]), Diverticulosis, GERD (gastroesophageal reflux disease), Hyperlipidemia, Hypertension, Left rotator cuff tear, Pneumonia, PONV (postoperative nausea and vomiting), Prolonged emergence from general anesthesia, and TIA (transient ischemic attack). has a past surgical history that includes Dental surgery; Hemorrhoid surgery; Appendectomy; Diagnostic Cardiac Cath Lab Procedure (07/26/10); Diagnostic Cardiac Cath Lab Procedure (10/04/10); Diagnostic Cardiac Cath Lab Procedure (11); Coronary angioplasty (10/04/10); Aortic valve replacement (11); Chest surgery (11); Cardiac catheterization (Right, 2013); ECHO Transesophageal (2014); Cardiac catheterization (2/10/15); Hysterectomy (); Abdomen surgery (); eye surgery; Colonoscopy; Endoscopy, colon, diagnostic; brain surgery (); and Cardiac valve replacement (6/17/15). Procedure/Surgery:  None  Precautions:  Falls, O2, external catheter, monitor HR  Equipment Needs:  TBD  Equipment Owned:  FWW    SUBJECTIVE:    Pt lives with daughter and son-in-law in a 2 story home with 4 stair(s) to enter and 1 rail(s). Bed is on 2nd floor and bath is on 2nd floor. Full flight of stairs with 0 rails to 2nd floor. Pt ambulated with no AD prior to admission. Pt received assistance with functional mobility prior to admission.     OBJECTIVE:   Initial Evaluation  Date:

## 2023-12-12 NOTE — DISCHARGE INSTR - COC
Continuity of Care Form    Patient Name: Joselyn English   :    MRN:  [id number]    Admit date:  12/10/2023  Discharge date:  ***    Code Status Order: Full Code   Advance Directives:     Admitting Physician:  Angela Nicole DO  PCP: Karen Casanova DO    Discharging Nurse: [facility] Unit/Room#: 9871/3919-K  Discharging Unit Phone Number: ***    Emergency Contact:   Extended Emergency Contact Information  Primary Emergency Contact: Zoila Acosta  Address: [address] [place] [address] [place] Phone: [phone]  Mobile Phone: [phone]  Relation: Child  Secondary Emergency Contact: Salvatore Soto  Mobile Phone: [phone]  Relation: Grandchild  Preferred language: English   needed? No    Past Surgical History:  Past Surgical History:   Procedure Laterality Date    ABDOMEN SURGERY  1969    hemmorhage after vag delivery    AORTIC VALVE REPLACEMENT  11    REPLACEMENT AV W/21 MM MEDTRONIC MOSAIC TISSUE PROSTHESIS (DR. FENG)    APPENDECTOMY      BRAIN SURGERY      tri geminal nerve [facility]    CARDIAC CATHETERIZATION Right 2013    Dr. Latoya Guzman  2/10/15    Dr Azul Pugh  6/17/15    AVR    CHEST SURGERY  11    POSTOP RE-EXPLORATION FOR BLEEDING FROM STERNAL WIRE    COLONOSCOPY      CORONARY ANGIOPLASTY  10/04/10    SUCCESSFUL GABRIEL OF PROXIMAL LAD, SUCCESSFUL GABRIEL OF RCA (PROXIMAL, MID AND DISTAL)    DENTAL SURGERY      full mouth teeth extracted    DIAGNOSTIC CARDIAC CATH LAB PROCEDURE  07/26/10    STENOTIC CAD, MILD AORTIC STENOSI & EFOF 70%.     DIAGNOSTIC CARDIAC CATH LAB PROCEDURE  10/04/10    DIAGNOSTIC CARDIAC CATH LAB PROCEDURE  11    PREVIOUSLY PLACED STENTS IN LAD/RCA PATENT    ECHOCARDIOGRAM TRANSESOPHAGEAL  2014         ENDOSCOPY, COLON, DIAGNOSTIC      EYE SURGERY      lan lense implants    HEMORRHOID SURGERY      HYSTERECTOMY (CERVIX STATUS Roverto Hurley  Phone:  [facility]:[phone]    Dialysis Facility (if applicable)   Name:  Address:  Dialysis Schedule:  Phone:  Fax:    / signature: Electronically signed by DORINA Kinney on 12/19/2023 at 10:35 AM      PHYSICIAN SECTION    Prognosis: {Prognosis:8705552600}    Condition at Discharge: [address] Patient Condition:[id number]}    Rehab Potential (if transferring to Rehab): {Prognosis:5439324804}    Recommended Labs or Other Treatments After Discharge: ***    Physician Certification: I certify the above information and transfer of Girish Forward  is necessary for the continuing treatment of the diagnosis listed and that she requires [address] for less 30 days.      Update Admission H&P: No change in H&P    PHYSICIAN SIGNATURE:  Electronically signed by Alina Raymundo MD on 12/19/23 at 1:19 PM EST

## 2023-12-12 NOTE — PROGRESS NOTES
Patient's breathing is labored with wheezing. Oxygen saturation 98%.  Dr. Tammie Gordon made aware    Duoneb q4 for wheezing per Dr. Tammie Gordon

## 2023-12-12 NOTE — CONSULTS
[facility]  Neurology Consult    Date:  12/12/2023  Patient Name:  Girish Jacobo  YOB: 1945  MRN: [id number]     PCP:  Maya Hughes DO   Referring:  No ref. provider found      Chief Complaint: weakness    History obtained from: patient, patient's daughter    Pratima Calderon is a 69 y.o. female admitted with weakness and confusion. She has a history of prior stroke and is noted to have UTI this admission with mildly elevated WBC. This could represent recrudescence, but will investigate for recurrent stroke. Also checking labs for possible giant cell arteritis +/- polymyalgia rheumatica. Plan  MRI brain w/o contrast  Continue ASA+Plavix  ESR, CRP to evaluate for GCA  Trend CKs        History of Present Illness:  Girish Jacobo is a 69 y.o. right handed female presenting for evaluation of weakness. The patient had reportedly not been feeling well on and off for the last several weeks. Prior to presenting to the ED the patient had reportedly been falling to her left (similar to past TIAs), but more recently the patient's daughter had heard her screaming from the toilet, and found that she was weak all over. She had also urinated everywhere and was falling to the right. Her left eye and the side of her face had seemed like when she had had a prior stroke. She also reports her legs (R>L) locking up on her occasionally. She also reports a headache over the past few weeks. This is most prominent bilaterally over the temples and the vertex. She does report scalp tenderness, blurred vision (but no cheyanne loss), and aching of her jaw muscles as well as muscles more generally. She is on ASA and Plavix at home for a history of CAD.          Medical History:   Past Medical History:   Diagnosis Date    Anxiety and depression     Arthritis     Asthma     Borderline diabetes     CAD (coronary artery disease)     CHF (congestive heart failure) (HCC)     Chronic back

## 2023-12-12 NOTE — PROGRESS NOTES
Occupational Therapy  OCCUPATIONAL THERAPY INITIAL EVALUATION    COLLEEN Floydtino  [facility]   [address] [place], [place]      PGUV:                                                Patient Name: Mushtaq Ryan  MRN: [id number]  : 1945  Room: [address] [place] [address] #[id number]    Referring Provider: Damaso Michele MD   Specific Provider Orders/Date: OT eval and treat 23     Diagnosis: TIA (transient ischemic attack) [G45.9]  Acute cystitis with hematuria [N30.01]  Stroke-like symptoms [R29.90]   Pt admitted to hospital on 12/10/23 for ataxia and dizziness    Pertinent Medical History:  has a past medical history of Anxiety and depression, Arthritis, Asthma, Borderline diabetes, CAD (coronary artery disease), CHF (congestive heart failure) ([address]), Chronic back pain, COPD (chronic obstructive pulmonary disease) ([address]), Diverticulosis, GERD (gastroesophageal reflux disease), Hyperlipidemia, Hypertension, Left rotator cuff tear, Pneumonia, PONV (postoperative nausea and vomiting), Prolonged emergence from general anesthesia, and TIA (transient ischemic attack).        Precautions:  Fall Risk, cognition, O2, monitor HR, Purewick, bed alarm    Assessment of current deficits    [x] Functional mobility  [x]ADLs  [x] Strength               [x]Cognition    [x] Functional transfers   [x] IADLs         [x] Safety Awareness   [x]Endurance    [] Fine Coordination              [x] Balance      [] Vision/perception   []Sensation     []Gross Motor Coordination  [] ROM  [] Delirium                   [] Motor Control     OT PLAN OF CARE   OT POC based on physician orders, patient diagnosis and results of clinical assessment    Frequency/Duration 1-3 days/wk for 2 weeks PRN   Specific OT Treatment Interventions to include:   * Instruction/training on adapted ADL techniques and AE recommendations to increase functional independence within precautions no    Pain Level: Pt c/o 10/10 head, neck and back pain this session ; reinforced pain management strategies    Cognition: A&O: 3/4 (self, knew place (not location), year/month); Follows 1 step directions  Difficulty keeping eyes open at times, no complaints verbalized just fatigue. Cues provided to attend to tasks. Memory:  fair    Sequencing:  fair    Problem solving:  fair -   Judgement/safety:  fair -     Functional Assessment:  AM-PAC Daily Activity Raw Score: 14/24   Initial Eval Status  Date: 12/12/23 Treatment Status  Date: STGs = LTGs  Time frame: 10-14 days   Feeding Minimal Assist   Modified [place]    Grooming Minimal Assist   Seated EOB to wash face and hands  Modified [place]    UB Dressing Moderate Assist   Supervision    LB Dressing Maximal Assist   B socks - figure four technique  Minimal Assist    Bathing Moderate Assist  Minimal Assist    Toileting Maximal Assist   Including hygiene task  Incontinent of bladder and bowel  Minimal Assist    Bed Mobility  Supine to sit: Minimal Assist   Sit to supine: Moderate Assist   Supine to sit: Supervision   Sit to supine: Supervision    Functional Transfers Moderate Assist   Stand by Assist    Functional Mobility Moderate Assist w/ HHA  Lateral sidesteps to [facility]  Stand by Assist    Balance Sitting:     Static: Min><SBA    Dynamic: Min A  Standing: Mod A w/ w/w     Activity Tolerance Fair-  Fatigued very quickly w/ minimal activity. Reinforced frequent rest breaks. Fair+   Visual/  Perceptual Glasses: no                  Hand Dominance R   AROM (PROM) Strength Additional Info:    RUE  WFL 3+/5 good  and wfl FMC/dexterity noted during ADL tasks       LUE WFL 3+/5 good  and wfl FMC/dexterity noted during ADL tasks       Hearing: WFL  Sensation:  No c/o numbness or tingling  Tone: WFL  Edema: none noted    Comments: Upon arrival patient lying in bed. Therapist educated pt on role of OT. RN clearance.  At end of session, patient semi-supine in

## 2023-12-12 NOTE — PROGRESS NOTES
Patient more delayed than earlier assessment. Weak on both sides, speech is mildly slurred, slight L droop, difficulty staying awake. NIH 2. Alert and oriented x4, follows all commands.  Perfect serve sent to Dr. Phyllis Smith

## 2023-12-12 NOTE — CONSULTS
CARDIOLOGY CONSULTATION    Patient Name:  Diallo Remy    :      Reason for Consultation:   History of aortic stenosis and subsequent aortic valve replacement    History of Present Illness:   Diallo Remy presents to [address] following history of transient facial droop and feeling generally weak. Subsequent neurologic evaluation suggests that of severe intracranial vascular disease as well as having a long-standing history of both coronary artery disease and aortic valve disease for which she underwent successful aortic valve replacement in  with her last apparent echocardiogram in  demonstrating a normal functioning bioprosthetic aortic valve yet with the previous transesophageal echo suggesting that of a mild - moderate perivalvular leak. Presently she denies any significant shortness of breath nor chest discomfort palpitations or lightheadedness. Past Medical History:   has a past medical history of Anxiety and depression, Arthritis, Asthma, Borderline diabetes, CAD (coronary artery disease), CHF (congestive heart failure) ([address]), Chronic back pain, COPD (chronic obstructive pulmonary disease) ([address]), Diverticulosis, GERD (gastroesophageal reflux disease), Hyperlipidemia, Hypertension, Left rotator cuff tear, Pneumonia, PONV (postoperative nausea and vomiting), Prolonged emergence from general anesthesia, and TIA (transient ischemic attack). prosthetic aortic valve # 21 Magna Ease bioprosthesis with perivalvular leak. Surgical History:   has a past surgical history that includes Dental surgery; Hemorrhoid surgery; Appendectomy; Diagnostic Cardiac Cath Lab Procedure (07/26/10); Diagnostic Cardiac Cath Lab Procedure (10/04/10); Diagnostic Cardiac Cath Lab Procedure (11); Coronary angioplasty (10/04/10); Aortic valve replacement (11); Chest surgery (11); Cardiac catheterization (Right, 2013); ECHO Transesophageal (2014);  Cardiac ensure accuracy; however, inadvertent computerized transcription errors may be present.

## 2023-12-12 NOTE — CARE COORDINATION
CM update. Met with daughter Marycarmen Elliott and patient in room. Daughter stated that patient was at [address] this past April. Referral was made, and awaiting completed therapy evals and notes to review for placement. Liaison Xi to update CM if accepted. Currently  on IV Rocephin for UTI. Patient for an echo. Currntly on 4 L n/c as at home. CM/SW will follow. Electronically signed by Niall Perez RN on 12/12/2023 at 11:43 AM   Addendum: Accepted at [address], and liaison started precert. Updated daughter. PASRR, and ambulance form completed and is in soft chart. Ambulance form will need signed at discharge. JULIA and destination updated.  MB

## 2023-12-13 ENCOUNTER — APPOINTMENT (OUTPATIENT)
Dept: GENERAL RADIOLOGY | Age: 78
DRG: 720 | End: 2023-12-13
Payer: COMMERCIAL

## 2023-12-13 ENCOUNTER — APPOINTMENT (OUTPATIENT)
Age: 78
DRG: 720 | End: 2023-12-13
Attending: INTERNAL MEDICINE
Payer: COMMERCIAL

## 2023-12-13 LAB
ANION GAP SERPL CALCULATED.3IONS-SCNC: 12 MMOL/L (ref 7–16)
BUN SERPL-MCNC: 15 MG/DL (ref 6–23)
CA-I BLD-SCNC: 1.13 MMOL/L (ref 1.15–1.33)
CALCIUM SERPL-MCNC: 8.1 MG/DL (ref 8.6–10.2)
CHLORIDE SERPL-SCNC: 106 MMOL/L (ref 98–107)
CK SERPL-CCNC: 438 U/L (ref 20–180)
CK SERPL-CCNC: 624 U/L (ref 20–180)
CO2 SERPL-SCNC: 26 MMOL/L (ref 22–29)
CREAT SERPL-MCNC: 1.3 MG/DL (ref 0.5–1)
DATE LAST DOSE: ABNORMAL
ERYTHROCYTE [DISTWIDTH] IN BLOOD BY AUTOMATED COUNT: 12.5 % (ref 11.5–15)
GFR SERPL CREATININE-BSD FRML MDRD: 44 ML/MIN/1.73M2
GLUCOSE SERPL-MCNC: 130 MG/DL (ref 74–99)
HCT VFR BLD AUTO: 29.9 % (ref 34–48)
HGB BLD-MCNC: 9.4 G/DL (ref 11.5–15.5)
MAGNESIUM SERPL-MCNC: 2 MG/DL (ref 1.6–2.6)
MCH RBC QN AUTO: 30.3 PG (ref 26–35)
MCHC RBC AUTO-ENTMCNC: 31.4 G/DL (ref 32–34.5)
MCV RBC AUTO: 96.5 FL (ref 80–99.9)
MICROORGANISM SPEC CULT: ABNORMAL
PHOSPHATE SERPL-MCNC: 3.7 MG/DL (ref 2.5–4.5)
PLATELET # BLD AUTO: 215 K/UL (ref 130–450)
PMV BLD AUTO: 10.5 FL (ref 7–12)
POTASSIUM SERPL-SCNC: 4.1 MMOL/L (ref 3.5–5)
RBC # BLD AUTO: 3.1 M/UL (ref 3.5–5.5)
SODIUM SERPL-SCNC: 144 MMOL/L (ref 132–146)
SPECIMEN DESCRIPTION: ABNORMAL
TME LAST DOSE: ABNORMAL H
VANCOMYCIN DOSE: ABNORMAL MG
VANCOMYCIN SERPL-MCNC: <4 UG/ML (ref 5–40)
WBC OTHER # BLD: 14 K/UL (ref 4.5–11.5)

## 2023-12-13 PROCEDURE — 99233 SBSQ HOSP IP/OBS HIGH 50: CPT | Performed by: NURSE PRACTITIONER

## 2023-12-13 PROCEDURE — 6370000000 HC RX 637 (ALT 250 FOR IP): Performed by: INTERNAL MEDICINE

## 2023-12-13 PROCEDURE — 2700000000 HC OXYGEN THERAPY PER DAY

## 2023-12-13 PROCEDURE — 82330 ASSAY OF CALCIUM: CPT

## 2023-12-13 PROCEDURE — 2580000003 HC RX 258: Performed by: SURGERY

## 2023-12-13 PROCEDURE — 6360000002 HC RX W HCPCS: Performed by: SURGERY

## 2023-12-13 PROCEDURE — 84100 ASSAY OF PHOSPHORUS: CPT

## 2023-12-13 PROCEDURE — 6360000002 HC RX W HCPCS: Performed by: NURSE PRACTITIONER

## 2023-12-13 PROCEDURE — 94640 AIRWAY INHALATION TREATMENT: CPT

## 2023-12-13 PROCEDURE — 93005 ELECTROCARDIOGRAM TRACING: CPT

## 2023-12-13 PROCEDURE — 85027 COMPLETE CBC AUTOMATED: CPT

## 2023-12-13 PROCEDURE — 2580000003 HC RX 258: Performed by: INTERNAL MEDICINE

## 2023-12-13 PROCEDURE — 83735 ASSAY OF MAGNESIUM: CPT

## 2023-12-13 PROCEDURE — 92610 EVALUATE SWALLOWING FUNCTION: CPT | Performed by: SPEECH-LANGUAGE PATHOLOGIST

## 2023-12-13 PROCEDURE — 82550 ASSAY OF CK (CPK): CPT

## 2023-12-13 PROCEDURE — 99223 1ST HOSP IP/OBS HIGH 75: CPT | Performed by: STUDENT IN AN ORGANIZED HEALTH CARE EDUCATION/TRAINING PROGRAM

## 2023-12-13 PROCEDURE — 6360000002 HC RX W HCPCS: Performed by: INTERNAL MEDICINE

## 2023-12-13 PROCEDURE — 80202 ASSAY OF VANCOMYCIN: CPT

## 2023-12-13 PROCEDURE — 94660 CPAP INITIATION&MGMT: CPT

## 2023-12-13 PROCEDURE — 80048 BASIC METABOLIC PNL TOTAL CA: CPT

## 2023-12-13 PROCEDURE — 97530 THERAPEUTIC ACTIVITIES: CPT

## 2023-12-13 PROCEDURE — 97164 PT RE-EVAL EST PLAN CARE: CPT

## 2023-12-13 PROCEDURE — 6370000000 HC RX 637 (ALT 250 FOR IP): Performed by: STUDENT IN AN ORGANIZED HEALTH CARE EDUCATION/TRAINING PROGRAM

## 2023-12-13 PROCEDURE — 2000000000 HC ICU R&B

## 2023-12-13 PROCEDURE — 71045 X-RAY EXAM CHEST 1 VIEW: CPT

## 2023-12-13 RX ORDER — DIAZEPAM 5 MG/ML
2.5 INJECTION, SOLUTION INTRAMUSCULAR; INTRAVENOUS
Status: COMPLETED | OUTPATIENT
Start: 2023-12-13 | End: 2023-12-13

## 2023-12-13 RX ADMIN — CLONAZEPAM 1 MG: 0.5 TABLET ORAL at 16:56

## 2023-12-13 RX ADMIN — PIPERACILLIN AND TAZOBACTAM 3375 MG: 3; .375 INJECTION, POWDER, LYOPHILIZED, FOR SOLUTION INTRAVENOUS at 09:45

## 2023-12-13 RX ADMIN — PAROXETINE 30 MG: 10 TABLET, FILM COATED ORAL at 10:47

## 2023-12-13 RX ADMIN — OXYCODONE AND ACETAMINOPHEN 1 TABLET: 5; 325 TABLET ORAL at 10:47

## 2023-12-13 RX ADMIN — EZETIMIBE 10 MG: 10 TABLET ORAL at 10:46

## 2023-12-13 RX ADMIN — OXYCODONE AND ACETAMINOPHEN 1 TABLET: 5; 325 TABLET ORAL at 16:56

## 2023-12-13 RX ADMIN — FUROSEMIDE 20 MG: 40 TABLET ORAL at 10:46

## 2023-12-13 RX ADMIN — ASPIRIN 81 MG CHEWABLE TABLET 81 MG: 81 TABLET CHEWABLE at 10:46

## 2023-12-13 RX ADMIN — BUDESONIDE INHALATION 1000 MCG: 0.5 SUSPENSION RESPIRATORY (INHALATION) at 08:03

## 2023-12-13 RX ADMIN — Medication 500 MG: at 10:47

## 2023-12-13 RX ADMIN — METOPROLOL TARTRATE 25 MG: 25 TABLET, FILM COATED ORAL at 10:46

## 2023-12-13 RX ADMIN — PIPERACILLIN AND TAZOBACTAM 3375 MG: 3; .375 INJECTION, POWDER, LYOPHILIZED, FOR SOLUTION INTRAVENOUS at 17:47

## 2023-12-13 RX ADMIN — CLOPIDOGREL BISULFATE 75 MG: 75 TABLET ORAL at 10:46

## 2023-12-13 RX ADMIN — ENOXAPARIN SODIUM 30 MG: 100 INJECTION SUBCUTANEOUS at 10:47

## 2023-12-13 RX ADMIN — PIPERACILLIN AND TAZOBACTAM 4500 MG: 4; .5 INJECTION, POWDER, FOR SOLUTION INTRAVENOUS at 00:36

## 2023-12-13 RX ADMIN — BUDESONIDE INHALATION 1000 MCG: 0.5 SUSPENSION RESPIRATORY (INHALATION) at 20:15

## 2023-12-13 RX ADMIN — Medication 10 ML: at 20:15

## 2023-12-13 RX ADMIN — DIAZEPAM 2.5 MG: 10 INJECTION, SOLUTION INTRAMUSCULAR; INTRAVENOUS at 20:44

## 2023-12-13 RX ADMIN — CLONAZEPAM 1 MG: 0.5 TABLET ORAL at 10:47

## 2023-12-13 RX ADMIN — LISINOPRIL 20 MG: 20 TABLET ORAL at 10:46

## 2023-12-13 RX ADMIN — PROCHLORPERAZINE EDISYLATE 10 MG: 5 INJECTION INTRAMUSCULAR; INTRAVENOUS at 11:01

## 2023-12-13 NOTE — PROGRESS NOTES
Echo attempted yesterday and patient refused because of nausea.  Echo attempted again this AM and patient is refusing again because she \"can't sit still\"

## 2023-12-13 NOTE — PROGRESS NOTES
SPEECH/LANGUAGE PATHOLOGY  CLINICAL ASSESSMENT OF SWALLOWING FUNCTION   and PLAN OF CARE    PATIENT NAME:  Blade Munson  (female)     MRN:  [id number]    :  1945  (69 y.o.)  STATUS:  Inpatient: Room 4521/4521-A    TODAY'S DATE:  2023 1630    SLP eval and treat  Start:  23 1630,   End:  23 1630,   ONE TIME,   Standing Count:  1 Occurrences,   R       Lucho Harvey MD Acknowledge   REASON FOR REFERRAL: concern for aspiration   EVALUATING THERAPIST: MAHIN Torres                 RESULTS:    DYSPHAGIA DIAGNOSIS:   Clinical indicators of possible pharyngeal phase dysphagia       DIET RECOMMENDATIONS:  NPO until MBSS can be completed  -- strong coughing noted as exam progressed, in addition to concern for aspiration per physician order     Pt unable to fully explain swallow function- reports she has to \"press on her throat real quick\" when asked about her swallow function      FEEDING RECOMMENDATIONS:     Assistance level:  Not applicable      Compensatory strategies recommended: Not applicable      Discussed recommendations with nursing and/or faxed report to referring provider: Yes    SPEECH THERAPY  PLAN OF CARE   The dysphagia POC is established based on physician order, dysphagia diagnosis and results of clinical assessment     Will establish POC once MBSS is completed. Conditions Requiring Skilled Therapeutic Intervention for dysphagia:    Patient is performing below functional baseline d/t  current acute condition, respiratory compromise, multiple medications, and/or increased dependency upon caregivers.     Specific dysphagia interventions to include:     MBSS to fully assess oropharyngeal swallow function and to assist in determining the least restrictive PO diet to maintain adequate nutrition/hydration     Specific instructions for next treatment:  MBSS to be completed  Patient Treatment Goals:    Short Term Goals:  Pt will participate in MBSS to fully assess

## 2023-12-13 NOTE — PROGRESS NOTES
Patient arrived to the NSICU from 8SE with encarnacion catheter intact and patent. Securing device applied. Encarnacion bag is hanging below the level of the bladder, safety clip attached to the bed sheet, tamper seal is intact, drainage bag is not on the floor, lack of dependent loop in tubing, and the drainage bag is less than half full.

## 2023-12-13 NOTE — CARE COORDINATION
Spoke with patient and daughter at bedside. They continue to plan for [facility] at discharge. Patient weaned back to 4L today after initially being on 06I this am. Precert for [facility] initiated yesterday. Pasrr and ambulance previously completed prior to RRT. Updated [facility] by voicemail of patient and that she may transfer out of ICU today so please continue precert process. For questions I can be reached at [phone].  Ruy Veliz [place]

## 2023-12-13 NOTE — PROGRESS NOTES
Patient arrived from 80 post RRT for respiratory distress and AMS. Currently on bipap. VSS. WOODARD. A&Ox4. Critical care notified of admission to NSICU.

## 2023-12-13 NOTE — PLAN OF CARE
Problem: ABCDS Injury Assessment  Goal: Absence of physical injury  Outcome: Progressing     Problem: Safety - Adult  Goal: Free from fall injury  Outcome: Progressing     Problem: Chronic Conditions and Co-morbidities  Goal: Patient's chronic conditions and co-morbidity symptoms are monitored and maintained or improved  Outcome: Progressing  Flowsheets (Taken 12/12/2023 2000)  Care Plan - Patient's Chronic Conditions and Co-Morbidity Symptoms are Monitored and Maintained or Improved:   Monitor and assess patient's chronic conditions and comorbid symptoms for stability, deterioration, or improvement   Collaborate with multidisciplinary team to address chronic and comorbid conditions and prevent exacerbation or deterioration   Update acute care plan with appropriate goals if chronic or comorbid symptoms are exacerbated and prevent overall improvement and discharge     Problem: Discharge Planning  Goal: Discharge to home or other facility with appropriate resources  Outcome: Progressing  Flowsheets (Taken 12/12/2023 2000)  Discharge to home or other facility with appropriate resources:   Identify barriers to discharge with patient and caregiver   Arrange for needed discharge resources and transportation as appropriate   Identify discharge learning needs (meds, wound care, etc)   Arrange for interpreters to assist at discharge as needed   Refer to discharge planning if patient needs post-hospital services based on physician order or complex needs related to functional status, cognitive ability or social support system     Problem: Skin/Tissue Integrity  Goal: Absence of new skin breakdown  Description: 1. Monitor for areas of redness and/or skin breakdown  2. Assess vascular access sites hourly  3. Every 4-6 hours minimum:  Change oxygen saturation probe site  4.   Every 4-6 hours:  If on nasal continuous positive airway pressure, respiratory therapy assess nares and determine need for appliance change or resting

## 2023-12-13 NOTE — CONSULTS
[facility]   INTENSIVE CARE UNIT     CRITICAL CARE ATTENDING PROGRESS NOTE    I have examined the patient, reviewed the record, and discussed the case with the APN/  Resident. I have reviewed all relevant labs and imaging data. Please refer to the  APN/ resident's note. I agree with the  assessment and plan with the following corrections/ additions. The following summarizes my clinical findings and independent assessment. CC: RRT    HOSPITAL COURSE:  71-year-old female admitted on December 10 for fatigue and generalized weakness. Patient was found to have a urinary tract infection was started on Rocephin. Stroke alert was called and CTA showed moderate to severe stenosis of the M1 M2 of the left MCA as well as the right vertebral artery. Patient was diagnosed with an acute CVA back in April 2023 as well as a intracranial aneurysm. Today a rapid response team was called because of shortness of breath. Patient was on 15 L facemask saturating 93%. Patient was placed on BiPAP. Patient no longer smokes however she has COPD and uses 4 L nasal cannula at baseline.   Patient has been on aspirin Plavix    Past Medical History:   Diagnosis Date    Anxiety and depression     Arthritis     Asthma     Borderline diabetes     CAD (coronary artery disease)     CHF (congestive heart failure) (HCC)     Chronic back pain     COPD (chronic obstructive pulmonary disease) (HCC)     Diverticulosis     GERD (gastroesophageal reflux disease)     Hyperlipidemia     Hypertension     Left rotator cuff tear     Pneumonia 2012    PONV (postoperative nausea and vomiting)     Prolonged emergence from general anesthesia     TIA (transient ischemic attack) 9/2012     Past Surgical History:   Procedure Laterality Date    ABDOMEN SURGERY  1969    hemmorhage after vag delivery    AORTIC VALVE REPLACEMENT  11/28/11    REPLACEMENT AV W/21 MM MEDTRONIC MOSAIC TISSUE PROSTHESIS (DR. FENG)    APPENDECTOMY      BRAIN

## 2023-12-13 NOTE — PROGRESS NOTES
Patient admitted to NSICU with the following belongings:  None. The following belongings admitted with the patient, None, were sent home with the patient's family.

## 2023-12-13 NOTE — PROGRESS NOTES
Consult received. Will discuss with Dr. Andre Lyman for appropriateness for ARU. Therapies pending.   Zeny Panda RN  ARU Pre-screener/  [phone]

## 2023-12-13 NOTE — PROGRESS NOTES
12/13/23 0805   NIV Type   $NIV $Daily Charge   NIV Started/Stopped On   Equipment Type V60   Mode Bilevel   Mask Type Full face mask   Mask Size Small   Assessment   Pulse 73   Heart Rate Source Monitor   Respirations 20   SpO2 100 %   Level of Consciousness 0   Comfort Level Good   Using Accessory Muscles No   Mask Compliance Good   Skin Assessment Clean, dry, & intact   Skin Protection for O2 Device Yes   Orientation Middle   Location Nose   Intervention(s) Skin Barrier   Settings/Measurements   PIP Observed 16 cm H20   IPAP 16 cmH20   CPAP/EPAP 8 cmH2O   Vt (Measured) 376 mL   Rate Ordered 20   Insp Rise Time (%) 2 %   FiO2  (S)  60 %   I Time/ I Time % 0.56 s   Minute Volume (L/min) 8.8 Liters   Mask Leak (lpm) 37 lpm   Patient's Home Machine No   Alarm Settings   Alarms On Y   Low Pressure (cmH2O) 8 cmH2O   High Pressure (cmH2O) 40 cmH2O   Apnea (secs) 20 secs   RR Low (bpm) 8   RR High (bpm) 45 br/min

## 2023-12-13 NOTE — SIGNIFICANT EVENT
Rapid Response Team Note  Date of event: 12/12/2023   Time of event: 04:00 PM  Kd Lindo 69y.o. year old female   YOB: 1945   Admit date:  12/10/2023   Location: 8507/8507-B   Witnessed? : []Yes  [] No  Monitored? : []Yes  [] No  Code status: [] Full  [] DNR-CCA  []DNR-CC  ______________________________________________________________________  Reason for RRT:    [] RR < 8     [] RR > 28   [] SpO2 <90%   [] HR < 40 bpm   [] HR > 130 bpm  [] SBP < 90 mmHg    [x] SpO2 <90%   [] LOC   [] Seizures    [] Significant Bleeding Event    [] Other:     Subjective:   CTSP regarding the above event, 70-year-old female with past medical history of CAD, COPD, hypertension, aortic stenosis status post AVR, admitted to the hospital with a complaint of facial droop and unilateral weakness. Patient has been working up for the stroke. RRT was called because of shortness of breath. On arrival patient was on 15 L oxygen saturating 93%. Blood pressure was elevated. ABG showed pH of 7.3, CO2 45, O2 79 and bicarb of 25. Chest x-ray showed bilateral effusion mostly on the right side.      Objective:   Vital signs: Temp: 98.4/BP: 175/89/RR: 40 /HR: 121  Initial Condition:  Conscious   [x] Yes  [] No     Breathing [x] Yes  [] No     Pulse  [x] Yes  [] No    Airway:   [x] Open/ Clear     Intervention: [] None  [] Pooled secretions     [] Suctioned  [] Stridor      [] Intubation    Lungs:   [x] Symmetrical chest rise/ CTABL Intervention: [] None  [] Use of accessory muscles    [] NIV (CPAP/BiPAP)  [] Cyanosis      [] Nasal Oxygen/Mask  [] Wheezing       [] ABG             [] CXR  [] Other:     Circulation:   Rhythm:  [] Sinus [] Other:   Intervention: [] None            [] IV Access  [] Peripheral              [] Central            [] EKG            [] Cardioversion            [] Defibrillation     Capillary Refill:  [] > 2 seconds [] < 2 seconds    Neurologic:   [] NIHSS      [] Pupillary Response:     Response to

## 2023-12-13 NOTE — PROGRESS NOTES
Date: 12/12/2023    Time: 9:03 PM    Patient Placed On BIPAP/CPAP/ Non-Invasive Ventilation? Yes    If no must comment. Facial area red/color change? No           If YES are Blister/Lesion present? No   If yes must notify nursing staff  BIPAP/CPAP skin barrier?   Yes    Skin barrier type:mepilexlite        12/12/23 2102   NIV Type   NIV Started/Stopped On   Assessment   Comfort Level Good   Using Accessory Muscles No   Mask Compliance Good   Skin Assessment Clean, dry, & intact   Skin Protection for O2 Device Yes   Orientation Middle   Location Nose   Intervention(s) Skin Barrier   Settings/Measurements   PIP Observed 17 cm H20   IPAP 16 cmH20   CPAP/EPAP 8 cmH2O   Vt (Measured) 654 mL   Rate Ordered 16   FiO2  95 %   Minute Volume (L/min) 15.6 Liters   Mask Leak (lpm) 45 lpm   Patient's Home Machine No   Alarm Settings   Alarms On Y   Low Pressure (cmH2O) 8 cmH2O   High Pressure (cmH2O) 40 cmH2O   Apnea (secs) 20 secs   RR Low (bpm) 8   RR High (bpm) 45 br/min             Smita Nath RCP

## 2023-12-13 NOTE — PROGRESS NOTES
[facility] Intensive Care Unit  Critical Care Consult 12/13/2023      Date of Admission: 12/10    Date of ICU Admission:  12/12    CC: SOB    HPI: 69year old female who was currently admitted for UTI, weakness and fatigue, developed respiratory distress ON, placed on Bipap and transferred to NSICU for further care. She received Bumex, switched to Zosyn and has been off Bipap this the am.  Pt also c/o CP that she has had for months since she has had \"double pneumonia. \"     Problem List:   Patient Active Problem List   Diagnosis    Aortic stenosis    COPD (chronic obstructive pulmonary disease) (formerly [facility])    Hypertension    H/O hyperlipidemia    Coronary artery disease involving native coronary artery of native heart without angina pectoris    Acute CVA (cerebrovascular accident) ([address])    Cerebral aneurysm    Intracranial atherosclerosis    Stroke-like symptoms    TIA (transient ischemic attack)    Acute cystitis with hematuria    NINA (acute kidney injury) ([address])    Acute cystitis without hematuria       PMH:    has a past medical history of Anxiety and depression, Arthritis, Asthma, Borderline diabetes, CAD (coronary artery disease), CHF (congestive heart failure) ([address]), Chronic back pain, COPD (chronic obstructive pulmonary disease) ([address]), Diverticulosis, GERD (gastroesophageal reflux disease), Hyperlipidemia, Hypertension, Left rotator cuff tear, Pneumonia, PONV (postoperative nausea and vomiting), Prolonged emergence from general anesthesia, and TIA (transient ischemic attack). PSH:    has a past surgical history that includes Dental surgery; Hemorrhoid surgery; Appendectomy; Diagnostic Cardiac Cath Lab Procedure (07/26/10); Diagnostic Cardiac Cath Lab Procedure (10/04/10); Diagnostic Cardiac Cath Lab Procedure (11/22/11); Coronary angioplasty (10/04/10); Aortic valve replacement (11/28/11); Chest surgery (11/28/11); Cardiac catheterization (Right, 1-8-2013); ECHO Transesophageal (1/27/2014);  Cardiac

## 2023-12-13 NOTE — PROGRESS NOTES
OCCUPATIONAL THERAPY TREATMENT NOTE    [facility] [address] [place] [address] [address], [place]       MOKT:                                                               Patient Name: Carmina Rendon  MRN: [id number]  : 1945  Room: [address] [place] [address] #[id number]     Referring Provider: Tima Castillo MD   Specific Provider Orders/Date: OT eval and treat 23      Diagnosis: TIA (transient ischemic attack) [G45.9]  Acute cystitis with hematuria [N30.01]  Stroke-like symptoms [R29.90]   Pt admitted to hospital on 12/10/23 for ataxia and dizziness     Pertinent Medical History:  has a past medical history of Anxiety and depression, Arthritis, Asthma, Borderline diabetes, CAD (coronary artery disease), CHF (congestive heart failure) ([address]), Chronic back pain, COPD (chronic obstructive pulmonary disease) ([address]), Diverticulosis, GERD (gastroesophageal reflux disease), Hyperlipidemia, Hypertension, Left rotator cuff tear, Pneumonia, PONV (postoperative nausea and vomiting), Prolonged emergence from general anesthesia, and TIA (transient ischemic attack).          Precautions:  Fall Risk, cognition, O2, monitor HR,  bed alarm    patient had RRT patient was found to have acute hypoxic respiratory failure, right-sided pleural effusion, left-sided opacification suspected aspiration pneumonia started on Zosyn, BiPAP and transferred to unit neuro ICU    Assessment of current deficits    [x] Functional mobility         [x]ADLs           [x] Strength                  [x]Cognition    [x] Functional transfers       [x] IADLs         [x] Safety Awareness   [x]Endurance    [] Fine Coordination                      [x] Balance      [] Vision/perception   []Sensation      []Gross Motor Coordination          [] ROM           [] Delirium                   [x] Motor Control      OT PLAN OF CARE   OT POC based on physician orders, patient diagnosis

## 2023-12-13 NOTE — PROGRESS NOTES
Pharmacy Consultation Note  (Antibiotic Dosing and Monitoring)    Note vancomycin discontinued. Clinical pharmacy will sign-off. Please re-consult if we can be of further assistance.     Antoine Schuster PharmD, BCPS, [facility] 12/13/2023 7:28 AM

## 2023-12-13 NOTE — PROGRESS NOTES
4 Eyes Skin Assessment     NAME:  Valentino Miller  YOB: 1945  MEDICAL RECORD NUMBER:  [id number]    The patient is being assessed for  Admission    I agree that at least one RN has performed a thorough Head to Toe Skin Assessment on the patient. ALL assessment sites listed below have been assessed. Areas assessed by both nurses:    Head, Face, Ears, Shoulders, Back, Chest, Arms, Elbows, Hands, Sacrum. Buttock, Coccyx, Ischium, Legs. Feet and Heels, and Under Medical Devices         Does the Patient have a Wound?  No noted wound(s)       Mark Prevention initiated by RN: Yes  Wound Care Orders initiated by RN: No    Pressure Injury (Stage 3,4, Unstageable, DTI, NWPT, and Complex wounds) if present, place Wound referral order by RN under : No    New Ostomies, if present place, Ostomy referral order under : No     Nurse 1 eSignature: Electronically signed by Lizeth Pulido RN on 12/12/23 at 9:54 PM EST    **SHARE this note so that the co-signing nurse can place an eSignature**    Nurse 2 eSignature: Electronically signed by Daniela Gibbons on 12/13/23 at 3:06 AM EST

## 2023-12-13 NOTE — PROGRESS NOTES
12/12/23 1948   NIV Type   NIV Started/Stopped On   Assessment   Pulse 96   Respirations 19   BP (!) 120/57   SpO2 99 %   Level of Consciousness 0   Comfort Level Good   Using Accessory Muscles Yes   Mask Compliance Good   Skin Assessment Clean, dry, & intact   Skin Protection for O2 Device Yes   Orientation Middle   Location Nose   Intervention(s) Skin Barrier   Settings/Measurements   IPAP 16 cmH20   CPAP/EPAP 8 cmH2O   Vt (Measured) 845 mL   FiO2  100 %   Minute Volume (L/min) 20.1 Liters   Mask Leak (lpm) 47 lpm   Patient's Home Machine No   Alarm Settings   Alarms On Y   Low Pressure (cmH2O) 8 cmH2O   High Pressure (cmH2O) 40 cmH2O   RR Low (bpm) 8   RR High (bpm) 45 br/min   Patient Transport   Time Spent Transporting 1-15   Transport Ventillation Type Noninvasive   Transport From Other  (Room 8507 B)   Transport Destination Other  (7746)   Emergency Equipment Included Yes     Date: 12/12/2023    Time: 7:50 PM    Patient Placed On BIPAP/CPAP/ Non-Invasive Ventilation? No, patient remains on Bipap. If no must comment. Facial area red/color change? No           If YES are Blister/Lesion present? No   If yes must notify nursing staff  BIPAP/CPAP skin barrier?   Yes    Skin barrier type:mepilexlite       Comments:        Cristian Stephens RCP

## 2023-12-13 NOTE — PLAN OF CARE
Problem: ABCDS Injury Assessment  Goal: Absence of physical injury  12/12/2023 2337 by Dennis Benitez RN  Outcome: Progressing     Problem: Safety - Adult  Goal: Free from fall injury  12/12/2023 2337 by Dennis Benitez RN  Outcome: Progressing     Problem: Chronic Conditions and Co-morbidities  Goal: Patient's chronic conditions and co-morbidity symptoms are monitored and maintained or improved  12/12/2023 2337 by Dennis Benitez RN  Outcome: Progressing  Flowsheets (Taken 12/12/2023 2000)  Care Plan - Patient's Chronic Conditions and Co-Morbidity Symptoms are Monitored and Maintained or Improved:   Monitor and assess patient's chronic conditions and comorbid symptoms for stability, deterioration, or improvement   Collaborate with multidisciplinary team to address chronic and comorbid conditions and prevent exacerbation or deterioration   Update acute care plan with appropriate goals if chronic or comorbid symptoms are exacerbated and prevent overall improvement and discharge     Problem: Discharge Planning  Goal: Discharge to home or other facility with appropriate resources  12/12/2023 2337 by Dennis Benitez RN  Outcome: Progressing  Flowsheets (Taken 12/12/2023 2000)  Discharge to home or other facility with appropriate resources:   Identify barriers to discharge with patient and caregiver   Arrange for needed discharge resources and transportation as appropriate   Identify discharge learning needs (meds, wound care, etc)   Arrange for interpreters to assist at discharge as needed   Refer to discharge planning if patient needs post-hospital services based on physician order or complex needs related to functional status, cognitive ability or social support system     Problem: Skin/Tissue Integrity  Goal: Absence of new skin breakdown  Description: 1. Monitor for areas of redness and/or skin breakdown  2. Assess vascular access sites hourly  3. Every 4-6 hours minimum:  Change oxygen saturation probe site  4.

## 2023-12-14 ENCOUNTER — APPOINTMENT (OUTPATIENT)
Dept: MRI IMAGING | Age: 78
DRG: 720 | End: 2023-12-14
Payer: COMMERCIAL

## 2023-12-14 ENCOUNTER — APPOINTMENT (OUTPATIENT)
Dept: GENERAL RADIOLOGY | Age: 78
DRG: 720 | End: 2023-12-14
Payer: COMMERCIAL

## 2023-12-14 LAB
ANION GAP SERPL CALCULATED.3IONS-SCNC: 16 MMOL/L (ref 7–16)
BUN SERPL-MCNC: 12 MG/DL (ref 6–23)
CALCIUM SERPL-MCNC: 8.7 MG/DL (ref 8.6–10.2)
CHLORIDE SERPL-SCNC: 101 MMOL/L (ref 98–107)
CK SERPL-CCNC: 224 U/L (ref 20–180)
CK SERPL-CCNC: 318 U/L (ref 20–180)
CO2 SERPL-SCNC: 25 MMOL/L (ref 22–29)
CREAT SERPL-MCNC: 0.9 MG/DL (ref 0.5–1)
CRP SERPL HS-MCNC: 124 MG/L (ref 0–5)
ERYTHROCYTE [DISTWIDTH] IN BLOOD BY AUTOMATED COUNT: 12.4 % (ref 11.5–15)
ERYTHROCYTE [SEDIMENTATION RATE] IN BLOOD BY WESTERGREN METHOD: 91 MM/HR (ref 0–20)
GFR SERPL CREATININE-BSD FRML MDRD: >60 ML/MIN/1.73M2
GLUCOSE SERPL-MCNC: 84 MG/DL (ref 74–99)
HCT VFR BLD AUTO: 33.2 % (ref 34–48)
HGB BLD-MCNC: 10.6 G/DL (ref 11.5–15.5)
MCH RBC QN AUTO: 30.7 PG (ref 26–35)
MCHC RBC AUTO-ENTMCNC: 31.9 G/DL (ref 32–34.5)
MCV RBC AUTO: 96.2 FL (ref 80–99.9)
PLATELET # BLD AUTO: 269 K/UL (ref 130–450)
PMV BLD AUTO: 10.9 FL (ref 7–12)
POTASSIUM SERPL-SCNC: 3.1 MMOL/L (ref 3.5–5)
RBC # BLD AUTO: 3.45 M/UL (ref 3.5–5.5)
SODIUM SERPL-SCNC: 142 MMOL/L (ref 132–146)
WBC OTHER # BLD: 9.7 K/UL (ref 4.5–11.5)

## 2023-12-14 PROCEDURE — 2580000003 HC RX 258: Performed by: INTERNAL MEDICINE

## 2023-12-14 PROCEDURE — 74230 X-RAY XM SWLNG FUNCJ C+: CPT

## 2023-12-14 PROCEDURE — 6370000000 HC RX 637 (ALT 250 FOR IP): Performed by: STUDENT IN AN ORGANIZED HEALTH CARE EDUCATION/TRAINING PROGRAM

## 2023-12-14 PROCEDURE — 6370000000 HC RX 637 (ALT 250 FOR IP): Performed by: INTERNAL MEDICINE

## 2023-12-14 PROCEDURE — 6360000004 HC RX CONTRAST MEDICATION: Performed by: RADIOLOGY

## 2023-12-14 PROCEDURE — 85027 COMPLETE CBC AUTOMATED: CPT

## 2023-12-14 PROCEDURE — A9577 INJ MULTIHANCE: HCPCS | Performed by: RADIOLOGY

## 2023-12-14 PROCEDURE — 2700000000 HC OXYGEN THERAPY PER DAY

## 2023-12-14 PROCEDURE — 92611 MOTION FLUOROSCOPY/SWALLOW: CPT | Performed by: SPEECH-LANGUAGE PATHOLOGIST

## 2023-12-14 PROCEDURE — 2060000000 HC ICU INTERMEDIATE R&B

## 2023-12-14 PROCEDURE — 99233 SBSQ HOSP IP/OBS HIGH 50: CPT | Performed by: NURSE PRACTITIONER

## 2023-12-14 PROCEDURE — 99222 1ST HOSP IP/OBS MODERATE 55: CPT | Performed by: STUDENT IN AN ORGANIZED HEALTH CARE EDUCATION/TRAINING PROGRAM

## 2023-12-14 PROCEDURE — 86140 C-REACTIVE PROTEIN: CPT

## 2023-12-14 PROCEDURE — 70553 MRI BRAIN STEM W/O & W/DYE: CPT

## 2023-12-14 PROCEDURE — 6360000002 HC RX W HCPCS: Performed by: NURSE PRACTITIONER

## 2023-12-14 PROCEDURE — 82550 ASSAY OF CK (CPK): CPT

## 2023-12-14 PROCEDURE — 92526 ORAL FUNCTION THERAPY: CPT | Performed by: SPEECH-LANGUAGE PATHOLOGIST

## 2023-12-14 PROCEDURE — 6360000002 HC RX W HCPCS: Performed by: SURGERY

## 2023-12-14 PROCEDURE — 85652 RBC SED RATE AUTOMATED: CPT

## 2023-12-14 PROCEDURE — 2580000003 HC RX 258: Performed by: SURGERY

## 2023-12-14 PROCEDURE — 6360000002 HC RX W HCPCS: Performed by: INTERNAL MEDICINE

## 2023-12-14 PROCEDURE — 94660 CPAP INITIATION&MGMT: CPT

## 2023-12-14 PROCEDURE — 80048 BASIC METABOLIC PNL TOTAL CA: CPT

## 2023-12-14 RX ORDER — DIAZEPAM 5 MG/ML
2.5 INJECTION, SOLUTION INTRAMUSCULAR; INTRAVENOUS ONCE
Status: COMPLETED | OUTPATIENT
Start: 2023-12-14 | End: 2023-12-14

## 2023-12-14 RX ORDER — DEXTROSE, SODIUM CHLORIDE, SODIUM LACTATE, POTASSIUM CHLORIDE, AND CALCIUM CHLORIDE 5; .6; .31; .03; .02 G/100ML; G/100ML; G/100ML; G/100ML; G/100ML
INJECTION, SOLUTION INTRAVENOUS CONTINUOUS
Status: DISCONTINUED | OUTPATIENT
Start: 2023-12-14 | End: 2023-12-18

## 2023-12-14 RX ORDER — DIAZEPAM 5 MG/ML
2.5 INJECTION, SOLUTION INTRAMUSCULAR; INTRAVENOUS
Status: ACTIVE | OUTPATIENT
Start: 2023-12-14 | End: 2023-12-15

## 2023-12-14 RX ADMIN — PROCHLORPERAZINE EDISYLATE 10 MG: 5 INJECTION INTRAMUSCULAR; INTRAVENOUS at 04:16

## 2023-12-14 RX ADMIN — GADOBENATE DIMEGLUMINE 18 ML: 529 INJECTION, SOLUTION INTRAVENOUS at 22:51

## 2023-12-14 RX ADMIN — Medication 10 ML: at 09:52

## 2023-12-14 RX ADMIN — PIPERACILLIN AND TAZOBACTAM 3375 MG: 3; .375 INJECTION, POWDER, LYOPHILIZED, FOR SOLUTION INTRAVENOUS at 02:00

## 2023-12-14 RX ADMIN — OXYCODONE AND ACETAMINOPHEN 1 TABLET: 5; 325 TABLET ORAL at 15:56

## 2023-12-14 RX ADMIN — MIRTAZAPINE 15 MG: 15 TABLET, FILM COATED ORAL at 21:05

## 2023-12-14 RX ADMIN — METOPROLOL TARTRATE 25 MG: 25 TABLET, FILM COATED ORAL at 21:05

## 2023-12-14 RX ADMIN — DOXEPIN HYDROCHLORIDE 100 MG: 50 CAPSULE ORAL at 21:13

## 2023-12-14 RX ADMIN — PIPERACILLIN AND TAZOBACTAM 3375 MG: 3; .375 INJECTION, POWDER, LYOPHILIZED, FOR SOLUTION INTRAVENOUS at 09:44

## 2023-12-14 RX ADMIN — Medication 10 ML: at 21:07

## 2023-12-14 RX ADMIN — DIAZEPAM 2.5 MG: 10 INJECTION, SOLUTION INTRAMUSCULAR; INTRAVENOUS at 21:07

## 2023-12-14 RX ADMIN — CLONAZEPAM 1 MG: 0.5 TABLET ORAL at 15:56

## 2023-12-14 RX ADMIN — PIPERACILLIN AND TAZOBACTAM 3375 MG: 3; .375 INJECTION, POWDER, LYOPHILIZED, FOR SOLUTION INTRAVENOUS at 16:43

## 2023-12-14 RX ADMIN — ATORVASTATIN CALCIUM 40 MG: 40 TABLET, FILM COATED ORAL at 21:05

## 2023-12-14 NOTE — PLAN OF CARE
Problem: Safety - Adult  Goal: Free from fall injury  Outcome: Progressing     Problem: Discharge Planning  Goal: Discharge to home or other facility with appropriate resources  Outcome: Progressing     Problem: Pain  Goal: Verbalizes/displays adequate comfort level or baseline comfort level  Outcome: Progressing     Problem: Neurosensory - Adult  Goal: Achieves stable or improved neurological status  Outcome: Progressing     Problem: Respiratory - Adult  Goal: Achieves optimal ventilation and oxygenation  Outcome: Progressing

## 2023-12-14 NOTE — PROGRESS NOTES
aspiration, and Reviewed recommendations for follow-up  Evaluation of Education:  Needs further instruction    This plan may be re-evaluated and revised as warranted. Evaluation Time includes thorough review of current medical information, gathering information on past medical history/social history and prior level of function, completion of standardized testing/informal observation of tasks, assessment of data and education on plan of care and goals. [x]The admitting diagnosis and active problem list, have been reviewed prior to initiation of this evaluation. CPT Code: 93271  dysphagia study    ACTIVE PROBLEM LIST:   Patient Active Problem List   Diagnosis    Aortic stenosis    COPD (chronic obstructive pulmonary disease) ([facility])    Hypertension    H/O hyperlipidemia    Coronary artery disease involving native coronary artery of native heart without angina pectoris    Acute CVA (cerebrovascular accident) ([facility])    Cerebral aneurysm    Intracranial atherosclerosis    Stroke-like symptoms    TIA (transient ischemic attack)    Acute cystitis with hematuria    NINA (acute kidney injury) ([facility])    Acute cystitis without hematuria       INTERVENTION  CPT Code: 77103  dysphagia tx    Speech Pathologist (SLP) completed education with the patient/family regarding procedure of Modified Barium Swallow Study prior to exam and then type of swallowing impairment following completion of MBSS. Reviewed current solid/liquid consistency diet recommendations --   Dysphagia 1, Pureed solids with  thin liquids (IDDSI level 0) and discussed compensatory strategies (Thorough oral care to prevent colonization of oral bacteria , Upright in bed/ chair as tolerated, Small, SINGLE cup sips only, Alternate solids and liquids, and NO STRAW) to ensure safe PO intake. Images from MBSS reviewed with patient/ family and education provided. Reviewed aspiration precautions.  Encouraged patient and/or family to engage SLP in unstructured Q&A

## 2023-12-14 NOTE — CARE COORDINATION
Patient failed bss yesterday. Messaged attending to see if she can have orders for MBSS today. [facility] initiated precert on 29/84. For intermediate floor when bed available. [facility] and ambulance on soft chart. For questions I can be reached at [phone].  [place], [place]

## 2023-12-14 NOTE — PROGRESS NOTES
Zosyn  Continue breathing treatment  Respiratory pathogen panel negative  Patient will need PT OT evaluation and long-term PT OT with placement  Cardiology has been consulted for chest pain and elevated troponin awaiting echocardiogram  Will resume maintainence fluid at low rate d5lr at 40cc/hr, as patient npo, and awaiting mbs evaluation         DVT Prophylaxis [] Lovenox, []  Heparin, [] SCDs, [] Ambulation   GI Prophylaxis [] PPI,  [] H2 Blocker,  [] Carafate,  [] Diet/Tube Feeds   Disposition Patient requires continued admission due to awaitin mbs and palcement, needs neurology clearance before discahrge   MDM [] Low, [] Moderate,[]  High  Patient's risk as above due to        Medications:  REVIEWED DAILY    Infusion Medications    [Held by provider] sodium chloride 100 mL/hr at 12/12/23 1340    sodium chloride       Scheduled Medications    piperacillin-tazobactam  3,375 mg IntraVENous Q8H    sodium chloride flush  5-40 mL IntraVENous 2 times per day    enoxaparin  30 mg SubCUTAneous BID    ascorbic acid  500 mg Oral Daily    aspirin  81 mg Oral Daily    atorvastatin  40 mg Oral Nightly    budesonide  1,000 mcg Nebulization BID RT    clopidogrel  75 mg Oral Daily    lisinopril  20 mg Oral Daily    clonazePAM  1 mg Oral BID    doxepin  100 mg Oral Nightly    ezetimibe  10 mg Oral Daily    furosemide  20 mg Oral Daily    metoprolol tartrate  25 mg Oral BID    pantoprazole  40 mg Oral QAM AC    mirtazapine  15 mg Oral Nightly    PARoxetine  30 mg Oral Daily     PRN Meds: loperamide, perflutren lipid microspheres, ipratropium 0.5 mg-albuterol 2.5 mg, prochlorperazine, oxyCODONE-acetaminophen, sodium chloride flush, sodium chloride, potassium chloride **OR** potassium alternative oral replacement **OR** potassium chloride, magnesium sulfate, polyethylene glycol, acetaminophen **OR** acetaminophen, hydrALAZINE, calcium carbonate    Labs:     Recent Labs     12/12/23  1620 12/13/23  0429 12/14/23  0400   WBC 12.3* 14.0* 9.7   HGB 11.4* 9.4* 10.6*   HCT 35.5 29.9* 33.2*    215 269       Recent Labs     12/12/23  1620 12/12/23  1620 12/13/23  0429 12/14/23  0400     --  144 142   K 3.4* 3.42* 4.1 3.1*     --  106 101   CO2 22  --  26 25   BUN 13  --  15 12   CREATININE 1.1*  --  1.3* 0.9   CALCIUM 8.4*  --  8.1* 8.7   PHOS 1.8*  --  3.7  --        Recent Labs     12/12/23  1620   PROT 6.0*   ALKPHOS 92   ALT 12   AST 30   BILITOT 0.4       No results for input(s): \"INR\" in the last 72 hours. Recent Labs     12/13/23  0815 12/13/23  1847 12/14/23  0400   CKTOTAL 624* 438* 318*       Chronic labs:    Lab Results   Component Value Date    CHOL 178 12/11/2023    TRIG 126 12/11/2023    HDL 48 12/11/2023    LDLCALC 101 (H) 04/10/2023    TSH 0.64 12/11/2023    INR 1.3 12/10/2023    LABA1C 5.4 12/11/2023       Radiology: REVIEWED DAILY    +++++++++++++++++++++++++++++++++++++++++++++++++  Nilda Vee MD   Hospitalist  [address] [place] [address]  +++++++++++++++++++++++++++++++++++++++++++++++++  NOTE: This report was transcribed using voice recognition software. Every effort was made to ensure accuracy; however, inadvertent computerized transcription errors may be present.

## 2023-12-14 NOTE — CONSULTS
[address], [address] [address] [facility]                                  CONSULTATION    PATIENT NAME: Henry Grady                     :        1945  MED REC NO:   [id number]                            ROOM:       8029  ACCOUNT NO:   [de-identified]                           ADMIT DATE: 12/10/2023  PROVIDER:     Elli Andrews MD    CONSULT DATE:  2023    CHIEF COMPLAINT:  CVA. HISTORY OF PRESENT ILLNESS:  The patient was admitted to [address] [place] [address]  on 2023 with weakness and ataxia. She has a history of  significant atherosclerosis as well as severe left middle cerebral  artery stenosis. She has had a prior stroke and there was concern about  a new stroke. There was significant stenosis on the CTA. No hemorrhage  or obvious new stroke on the CAT scan. An MRI is ordered, but still  pending to confirm whether or not she has had another stroke. She has  been evaluated by Therapy and she is showing significant functional  worsening from her baseline. She tells me she had been pretty  functional at home before this and independent. She is currently a  moderate assist for transfers, moderate assist to ambulate only a couple  of steps, mod to max for her ADLs, and I was asked to see her for  planning further rehab. She is also seeing Speech for dysphasia and has  a video fluoroscopy scheduled. PAST MEDICAL HISTORY:  The patient is not a very good historian. She  does have a history of prior stroke, COPD, hypertension, aortic  stenosis, coronary artery disease, and cerebral aneurysm. SOCIAL HISTORY:  She lives with her daughter and son-in-law. ALLERGIES:  CODEINE and PAIN PATCH. CURRENT MEDICATIONS:  Aspirin, Lipitor, Pulmicort, Klonopin, Plavix,  Sinequan, Lovenox, Zetia, Lasix, Prinivil, Lopressor, Remeron, Protonix,  Paxil, and she is currently on IV Zosyn for possible aspiration  pneumonia.     REVIEW

## 2023-12-14 NOTE — PLAN OF CARE
Please note that this is a late entry. I had initially evaluated this patient in the emergency department on 12/10/2023. She has previously seen my colleague Dr. Yvon Phillips for multifocal intracranial atherosclerosis and small left ICA intracranial aneurysm but refused any further endovascular workup. Her examination today is nonfocal.  Previously she has been labeled to have right hemispheric TIAs secondary to intracranial atherosclerosis. It could be very possible that she now has left hemispheric TIAs secondary to moderate to severe intracranial atherosclerosis. She does not qualify for IV TNK. I discussed at length with the patient's daughter by the bedside who is stresses on the fact that she cannot take care of her mother at home and needs help with placement. I also discussed with the emergency department physician. No further endovascular workup is needed/required. Further management and decision making per the emergency department team.    Imelda Alfaro M.D.   Vascular Neurology & Neuroendovascular Surgery

## 2023-12-14 NOTE — PROGRESS NOTES
Spoke with patient of wearing bipap tonight. Patient refuses to wear at this time. She stated it makes her feel sick.

## 2023-12-14 NOTE — PROGRESS NOTES
Patient taken down to MRI, received a call that patient refused MRI multiple times. Patient back on floor, no needs addressed at this time.

## 2023-12-14 NOTE — PLAN OF CARE
Problem: ABCDS Injury Assessment  Goal: Absence of physical injury  Outcome: Progressing  Flowsheets (Taken 12/14/2023 1047)  Absence of Physical Injury: Implement safety measures based on patient assessment     Problem: Safety - Adult  Goal: Free from fall injury  12/14/2023 1047 by Mg Connolly RN  Outcome: Progressing  Flowsheets (Taken 12/14/2023 1047)  Free From Fall Injury:   Instruct family/caregiver on patient safety   Based on caregiver fall risk screen, instruct family/caregiver to ask for assistance with transferring infant if caregiver noted to have fall risk factors     Problem: Chronic Conditions and Co-morbidities  Goal: Patient's chronic conditions and co-morbidity symptoms are monitored and maintained or improved  Outcome: Progressing  Flowsheets (Taken 12/14/2023 1047)  Care Plan - Patient's Chronic Conditions and Co-Morbidity Symptoms are Monitored and Maintained or Improved:   Monitor and assess patient's chronic conditions and comorbid symptoms for stability, deterioration, or improvement   Collaborate with multidisciplinary team to address chronic and comorbid conditions and prevent exacerbation or deterioration   Update acute care plan with appropriate goals if chronic or comorbid symptoms are exacerbated and prevent overall improvement and discharge     Problem: Discharge Planning  Goal: Discharge to home or other facility with appropriate resources  12/14/2023 1047 by Mg Connolly RN  Outcome: Progressing  Flowsheets (Taken 12/12/2023 2000 by Lamonte Graves RN)  Discharge to home or other facility with appropriate resources:   Identify barriers to discharge with patient and caregiver   Arrange for needed discharge resources and transportation as appropriate   Identify discharge learning needs (meds, wound care, etc)   Arrange for interpreters to assist at discharge as needed   Refer to discharge planning if patient needs post-hospital services based on physician order or complex needs

## 2023-12-15 ENCOUNTER — APPOINTMENT (OUTPATIENT)
Age: 78
DRG: 720 | End: 2023-12-15
Attending: INTERNAL MEDICINE
Payer: COMMERCIAL

## 2023-12-15 LAB
ANION GAP SERPL CALCULATED.3IONS-SCNC: 12 MMOL/L (ref 7–16)
BUN SERPL-MCNC: 8 MG/DL (ref 6–23)
CALCIUM SERPL-MCNC: 9 MG/DL (ref 8.6–10.2)
CHLORIDE SERPL-SCNC: 103 MMOL/L (ref 98–107)
CO2 SERPL-SCNC: 27 MMOL/L (ref 22–29)
CREAT SERPL-MCNC: 0.8 MG/DL (ref 0.5–1)
ERYTHROCYTE [DISTWIDTH] IN BLOOD BY AUTOMATED COUNT: 12 % (ref 11.5–15)
GFR SERPL CREATININE-BSD FRML MDRD: >60 ML/MIN/1.73M2
GLUCOSE SERPL-MCNC: 89 MG/DL (ref 74–99)
HCT VFR BLD AUTO: 30.6 % (ref 34–48)
HGB BLD-MCNC: 9.6 G/DL (ref 11.5–15.5)
MCH RBC QN AUTO: 30.2 PG (ref 26–35)
MCHC RBC AUTO-ENTMCNC: 31.4 G/DL (ref 32–34.5)
MCV RBC AUTO: 96.2 FL (ref 80–99.9)
PLATELET # BLD AUTO: 301 K/UL (ref 130–450)
PMV BLD AUTO: 10.2 FL (ref 7–12)
POTASSIUM SERPL-SCNC: 3.4 MMOL/L (ref 3.5–5)
RBC # BLD AUTO: 3.18 M/UL (ref 3.5–5.5)
SODIUM SERPL-SCNC: 142 MMOL/L (ref 132–146)
WBC OTHER # BLD: 8 K/UL (ref 4.5–11.5)

## 2023-12-15 PROCEDURE — 94660 CPAP INITIATION&MGMT: CPT

## 2023-12-15 PROCEDURE — 99223 1ST HOSP IP/OBS HIGH 75: CPT | Performed by: STUDENT IN AN ORGANIZED HEALTH CARE EDUCATION/TRAINING PROGRAM

## 2023-12-15 PROCEDURE — 2060000000 HC ICU INTERMEDIATE R&B

## 2023-12-15 PROCEDURE — 97530 THERAPEUTIC ACTIVITIES: CPT

## 2023-12-15 PROCEDURE — 6370000000 HC RX 637 (ALT 250 FOR IP): Performed by: INTERNAL MEDICINE

## 2023-12-15 PROCEDURE — 6360000002 HC RX W HCPCS: Performed by: INTERNAL MEDICINE

## 2023-12-15 PROCEDURE — 2580000003 HC RX 258: Performed by: SURGERY

## 2023-12-15 PROCEDURE — 97535 SELF CARE MNGMENT TRAINING: CPT

## 2023-12-15 PROCEDURE — 6370000000 HC RX 637 (ALT 250 FOR IP): Performed by: STUDENT IN AN ORGANIZED HEALTH CARE EDUCATION/TRAINING PROGRAM

## 2023-12-15 PROCEDURE — 2580000003 HC RX 258: Performed by: INTERNAL MEDICINE

## 2023-12-15 PROCEDURE — 93306 TTE W/DOPPLER COMPLETE: CPT

## 2023-12-15 PROCEDURE — 6360000002 HC RX W HCPCS: Performed by: SURGERY

## 2023-12-15 PROCEDURE — 92526 ORAL FUNCTION THERAPY: CPT

## 2023-12-15 PROCEDURE — 85027 COMPLETE CBC AUTOMATED: CPT

## 2023-12-15 PROCEDURE — 2700000000 HC OXYGEN THERAPY PER DAY

## 2023-12-15 PROCEDURE — 36415 COLL VENOUS BLD VENIPUNCTURE: CPT

## 2023-12-15 PROCEDURE — 80048 BASIC METABOLIC PNL TOTAL CA: CPT

## 2023-12-15 PROCEDURE — 94640 AIRWAY INHALATION TREATMENT: CPT

## 2023-12-15 RX ORDER — METOPROLOL TARTRATE 50 MG/1
50 TABLET, FILM COATED ORAL 2 TIMES DAILY
Status: DISCONTINUED | OUTPATIENT
Start: 2023-12-15 | End: 2023-12-17

## 2023-12-15 RX ORDER — AMOXICILLIN AND CLAVULANATE POTASSIUM 875; 125 MG/1; MG/1
1 TABLET, FILM COATED ORAL EVERY 12 HOURS SCHEDULED
Status: DISCONTINUED | OUTPATIENT
Start: 2023-12-15 | End: 2023-12-19 | Stop reason: HOSPADM

## 2023-12-15 RX ORDER — LISINOPRIL 10 MG/1
30 TABLET ORAL DAILY
Status: DISCONTINUED | OUTPATIENT
Start: 2023-12-15 | End: 2023-12-19 | Stop reason: HOSPADM

## 2023-12-15 RX ADMIN — BUDESONIDE INHALATION 1000 MCG: 0.5 SUSPENSION RESPIRATORY (INHALATION) at 18:39

## 2023-12-15 RX ADMIN — METOPROLOL TARTRATE 50 MG: 50 TABLET ORAL at 20:07

## 2023-12-15 RX ADMIN — PIPERACILLIN AND TAZOBACTAM 3375 MG: 3; .375 INJECTION, POWDER, LYOPHILIZED, FOR SOLUTION INTRAVENOUS at 01:24

## 2023-12-15 RX ADMIN — Medication 500 MG: at 08:45

## 2023-12-15 RX ADMIN — FUROSEMIDE 20 MG: 40 TABLET ORAL at 08:53

## 2023-12-15 RX ADMIN — PAROXETINE 30 MG: 10 TABLET, FILM COATED ORAL at 08:45

## 2023-12-15 RX ADMIN — CLONAZEPAM 1 MG: 0.5 TABLET ORAL at 08:46

## 2023-12-15 RX ADMIN — Medication 10 ML: at 08:46

## 2023-12-15 RX ADMIN — LISINOPRIL 30 MG: 10 TABLET ORAL at 08:54

## 2023-12-15 RX ADMIN — METOPROLOL TARTRATE 50 MG: 50 TABLET ORAL at 08:53

## 2023-12-15 RX ADMIN — POTASSIUM BICARBONATE 40 MEQ: 782 TABLET, EFFERVESCENT ORAL at 17:04

## 2023-12-15 RX ADMIN — CLOPIDOGREL BISULFATE 75 MG: 75 TABLET ORAL at 08:46

## 2023-12-15 RX ADMIN — AMOXICILLIN AND CLAVULANATE POTASSIUM 1 TABLET: 875; 125 TABLET, FILM COATED ORAL at 08:45

## 2023-12-15 RX ADMIN — EZETIMIBE 10 MG: 10 TABLET ORAL at 08:45

## 2023-12-15 RX ADMIN — MIRTAZAPINE 15 MG: 15 TABLET, FILM COATED ORAL at 20:07

## 2023-12-15 RX ADMIN — DOXEPIN HYDROCHLORIDE 100 MG: 50 CAPSULE ORAL at 20:07

## 2023-12-15 RX ADMIN — PANTOPRAZOLE SODIUM 40 MG: 40 TABLET, DELAYED RELEASE ORAL at 05:48

## 2023-12-15 RX ADMIN — Medication 10 ML: at 20:10

## 2023-12-15 RX ADMIN — ENOXAPARIN SODIUM 30 MG: 100 INJECTION SUBCUTANEOUS at 08:46

## 2023-12-15 RX ADMIN — AMOXICILLIN AND CLAVULANATE POTASSIUM 1 TABLET: 875; 125 TABLET, FILM COATED ORAL at 20:07

## 2023-12-15 RX ADMIN — ATORVASTATIN CALCIUM 40 MG: 40 TABLET, FILM COATED ORAL at 20:07

## 2023-12-15 RX ADMIN — ASPIRIN 81 MG CHEWABLE TABLET 81 MG: 81 TABLET CHEWABLE at 08:45

## 2023-12-15 RX ADMIN — BUDESONIDE INHALATION 1000 MCG: 0.5 SUSPENSION RESPIRATORY (INHALATION) at 09:38

## 2023-12-15 RX ADMIN — CLONAZEPAM 1 MG: 0.5 TABLET ORAL at 17:05

## 2023-12-15 NOTE — PROGRESS NOTES
4 Eyes Skin Assessment     NAME:  Zach Jimenez  YOB: 1945  MEDICAL RECORD NUMBER:  [id number]    The patient is being assessed for  Transfer to New Unit    I agree that at least one RN has performed a thorough Head to Toe Skin Assessment on the patient. ALL assessment sites listed below have been assessed. Areas assessed by both nurses:    Head, Face, Ears, Shoulders, Back, Chest, Arms, Elbows, Hands, Sacrum. Buttock, Coccyx, Ischium, Legs. Feet and Heels, and Under Medical Devices         Does the Patient have a Wound?  No noted wound(s)       Mark Prevention initiated by RN: Yes  Wound Care Orders initiated by RN: No    Pressure Injury (Stage 3,4, Unstageable, DTI, NWPT, and Complex wounds) if present, place Wound referral order by RN under : No    New Ostomies, if present place, Ostomy referral order under : No     Nurse 1 eSignature: Electronically signed by Farooq Rivera RN on 12/14/23 at 7:07 PM EST    **SHARE this note so that the co-signing nurse can place an eSignature**    Nurse 2 eSignature: Electronically signed by Babita Kellogg RN on 12/14/23 at 7:46 PM EST

## 2023-12-15 NOTE — PROGRESS NOTES
Spoke with patient's daughter, Daniella Kulkarni, gave her an update and addressed all questions and concerns

## 2023-12-15 NOTE — PROGRESS NOTES
Physical Therapy  Physical Therapy Treatment    Name: Germain Torre  :   MRN: [id number]      Date of Service: 12/15/2023    Evaluating PT:  Florian Subramanian PT, DPT [id number]    Room #:  6738/2490-O  Diagnosis:  TIA (transient ischemic attack) [G45.9]  Acute cystitis with hematuria [N30.01]  Stroke-like symptoms [R29.90]  PMHx/PSHx:   has a past medical history of Anxiety and depression, Arthritis, Asthma, Borderline diabetes, CAD (coronary artery disease), CHF (congestive heart failure) ([address]), Chronic back pain, COPD (chronic obstructive pulmonary disease) ([address]), Diverticulosis, GERD (gastroesophageal reflux disease), Hyperlipidemia, Hypertension, Left rotator cuff tear, Pneumonia, PONV (postoperative nausea and vomiting), Prolonged emergence from general anesthesia, and TIA (transient ischemic attack). has a past surgical history that includes Dental surgery; Hemorrhoid surgery; Appendectomy; Diagnostic Cardiac Cath Lab Procedure (07/26/10); Diagnostic Cardiac Cath Lab Procedure (10/04/10); Diagnostic Cardiac Cath Lab Procedure (11); Coronary angioplasty (10/04/10); Aortic valve replacement (11); Chest surgery (11); Cardiac catheterization (Right, 2013); ECHO Transesophageal (2014); Cardiac catheterization (2/10/15); Hysterectomy (); Abdomen surgery (); eye surgery; Colonoscopy; Endoscopy, colon, diagnostic; brain surgery (); and Cardiac valve replacement (6/17/15). Procedure/Surgery:  None  Precautions:  Falls, bed/chair alarm, O2, cognition, impulsive  Equipment Needs:  TBD    SUBJECTIVE:    Pt lives with daughter and son-in-law in a 2 story home with 4 stair(s) to enter and 1 rail(s). Bed is on 2nd floor and bath is on 2nd floor. Full flight of stairs with 0 rails to 2nd floor. Pt ambulated with no AD prior to admission. Pt received assistance with functional mobility prior to admission.     OBJECTIVE:   Re-Evaluation  Date: 23 Treatment  12/15/2023 call light in reach. Treatment:  Patient practiced and was instructed in the following treatment:    Bed mobility - physical assistance provided as needed during activity  Static/dynamic sitting - performed to promote activity tolerance and balance maintenance  Therapeutic exercises/activities - performed to maintain/improve strength and endurance  Functional transfers - physical assistance provided as needed during activity  Ambulation - physical assistance provided as needed during activity  Skilled monitoring of vitals    PLAN:    Patient is making good progress towards established goals. Will continue with current POC.       Time in  1216  Time out  1240    Total Treatment Time  24 minutes     CPT codes:  [] Gait training 23298 0 minutes  [] Manual therapy 91935 0 minutes  [x] Therapeutic activities 30969 24 minutes  [] Therapeutic exercises 43810 0 minutes  [] Neuromuscular reeducation 40844 0 minutes    Mariel Zhao PT, DPT  [id number]

## 2023-12-15 NOTE — PROGRESS NOTES
OCCUPATIONAL THERAPY TREATMENT NOTE   COLLEEN Chacontino [address] [place] [address]   [address] , [place], [place]       ANMX:68/15/8314                                                               Patient Name: Columba Alves  MRN: [id number]  : 1945  Room: [address] [place] [address]    Evaluating [address] [place] [address]     Referring Provider: Vishal Quigley MD   Specific Provider Orders/Date: OT eval and treat 23      Diagnosis: TIA (transient ischemic attack) [G45.9]  Acute cystitis with hematuria [N30.01]  Stroke-like symptoms [R29.90]   Pt admitted to hospital on 12/10/23 for ataxia and dizziness     Pertinent Medical History:  has a past medical history of Anxiety and depression, Arthritis, Asthma, Borderline diabetes, CAD (coronary artery disease), CHF (congestive heart failure) ([address]), Chronic back pain, COPD (chronic obstructive pulmonary disease) ([address]), Diverticulosis, GERD (gastroesophageal reflux disease), Hyperlipidemia, Hypertension, Left rotator cuff tear, Pneumonia, PONV (postoperative nausea and vomiting), Prolonged emergence from general anesthesia, and TIA (transient ischemic attack).          Precautions:  Fall Risk, cognition, O2, monitor HR,  bed alarm    patient had RRT patient was found to have acute hypoxic respiratory failure, right-sided pleural effusion, left-sided opacification suspected aspiration pneumonia started on Zosyn, BiPAP and transferred to unit neuro ICU    Assessment of current deficits    [x] Functional mobility         [x]ADLs           [x] Strength                  [x]Cognition    [x] Functional transfers       [x] IADLs         [x] Safety Awareness   [x]Endurance    [] Fine Coordination                      [x] Balance      [] Vision/perception   []Sensation      []Gross Motor Coordination          [] ROM           [] Delirium                   [x] Motor Control      OT PLAN OF CARE   OT POC based on physician orders, patient diagnosis Bathroom setup: tub/shower however has been sponge bathing recently   Equipment owned: w/w, home O2 (continuous 4L)     Prior Level of Function: assist w/ bathing, independent/mod I with additional ADLs , assist with IADLs; ambulated w/ family assist or furniture walks   Driving: no     Pain Level: Pt c/o back pain, unable to rate      Cognition: A&O: 3/4   year/month); Follows 1 step directions  Difficulty keeping eyes open at times, no complaints verbalized just fatigue. Cues provided to attend to tasks. Memory:  fair            Sequencing:  fair            Problem solving:  fair -           Judgement/safety:  fair -             Functional Assessment:  AM-PAC Daily Activity Raw Score: 15/24    Initial Eval Status  Date: 12/12/23 Treatment Status  Date:12/15/23 STGs = LTGs  Time frame: 10-14 days   Feeding Minimal Assist  Supervision  After set up sitting upright in bed Modified [place]    Grooming Minimal Assist   Seated EOB to wash face and hands  Min A   Sitting EOB to brush hair, vc for task initiation Modified [place]    UB Dressing Moderate Assist   Min A   To dof/don gown sitting EOB Supervision    LB Dressing Maximal Assist   B socks - figure four technique Mod A  To dof/don socks using cross leg tech sitting EOB, vc for sequencing Minimal Assist    Bathing Moderate Assist  Mod A  *decreased reach to LE's Minimal Assist    Toileting Maximal Assist   Including hygiene task  Incontinent of bladder and bowel Mod A  Simulated reaching necessary for clothing management/hygiene standing at EOB Minimal Assist    Bed Mobility  Supine to sit: Minimal Assist   Sit to supine: Moderate Assist  Supine to sit:   Mod A    Sit to supine:    Mod A   Supine to sit: Supervision   Sit to supine: Supervision    Functional Transfers Moderate Assist   Min A   SPT to chair Stand by Assist    Functional Mobility Moderate Assist w/ HHA  Lateral sidesteps to [facility] Moderate Assist w/ HHA  Lateral sidesteps to [facility]

## 2023-12-15 NOTE — PROGRESS NOTES
PROGRESS NOTE       PATIENT PROBLEM LIST:  Patient Active Problem List   Diagnosis Code    Aortic stenosis I35.0    COPD (chronic obstructive pulmonary disease) ([facility]) J44.9    Hypertension I10    H/O hyperlipidemia Z86.39    Coronary artery disease involving native coronary artery of native heart without angina pectoris I25.10    Acute CVA (cerebrovascular accident) ([facility]) I63.9    Cerebral aneurysm I67.1    Intracranial atherosclerosis I67.2    Stroke-like symptoms R29.90    TIA (transient ischemic attack) G45.9    Acute cystitis with hematuria N30.01    NINA (acute kidney injury) ([facility]) N17.9    Acute cystitis without hematuria N30.00       SUBJECTIVE:  Brigetteadinayelena Horacio states she is awaiting her MRI which she could not tolerate yesterday but has received a dose of Versed. She denies significant increase in shortness of breath nor palpitations or chest discomfort presently. REVIEW OF SYSTEMS:  General ROS: negative for - fatigue, malaise,  weight gain or weight loss  Psychological ROS: negative for - anxiety , depression  Ophthalmic ROS: negative for - decreased vision or visual distortion. ENT ROS: negative  Allergy and Immunology ROS: negative  Hematological and Lymphatic ROS: negative  Endocrine: no heat or cold intolerance and no polyphagia, polydipsia, or polyuria  Respiratory ROS: negative for - hemoptysis, pleuritic pain, and wheezing  Cardiovascular ROS: positive for - dyspnea on exertion, irregular heartbeat, and murmur. Gastrointestinal ROS: no abdominal pain, change in bowel habits, or black or bloody stools  Genito-Urinary ROS: no nocturia, dysuria, trouble voiding, frequency or hematuria  Musculoskeletal ROS: negative for- myalgias, arthralgias, or claudication  Neurological ROS: no TIA or stroke symptoms otherwise no significant change in symptoms or problems since yesterday as documented in previous progress notes.     SCHEDULED MEDICATIONS:   piperacillin-tazobactam  3,375 mg IntraVENous Q8H mid and lower lung zones. These are nonspecific and may be on the basis of pulmonary edema or multifocal pneumonia. EKG: See Report  Echo: See Report      IMPRESSIONS:  Patient Active Problem List   Diagnosis Code    Aortic stenosis I35.0    COPD (chronic obstructive pulmonary disease) ([address]) J44.9    Hypertension I10    H/O hyperlipidemia Z86.39    Coronary artery disease involving native coronary artery of native heart without angina pectoris I25.10    Acute CVA (cerebrovascular accident) ([address]) I63.9    Cerebral aneurysm I67.1    Intracranial atherosclerosis I67.2    Stroke-like symptoms R29.90    TIA (transient ischemic attack) G45.9    Acute cystitis with hematuria N30.01    NINA (acute kidney injury) ([address]) N17.9    Acute cystitis without hematuria N30.00       RECOMMENDATIONS:  Awaiting completion of MRI and would continue to monitor as she does demonstrate ventricular ectopy but fortunately has no complaints of chest discomfort nor significant shortness of breath presently. she does have demonstrated moderate aortic regurgitation and known coronary artery disease with previous stents in both the proximal left anterior descending and right coronary arteries. Would maintain LDL cholesterol within updated 2020 ACC/AHA/AACE/ESC/EAS cholesterol guidelines. we will repeat two-dimensional echocardiogram to reassess aortic valvular status and left ventricular function and adjust cardiac medications as needed. I have spent more than 25 minutes face to face with Keya Berrios and reviewing notes and laboratory data, with greater than 50% of this time instructing and counseling the patient  face to face regarding my findings and recommendations and I have answered all questions as posed to me by Ms. Hutson. Keith Baig, DO FACP,FACC,FSCAI      NOTE:  This report was transcribed using voice recognition software.   Every effort was made to ensure accuracy; however, inadvertent computerized transcription

## 2023-12-15 NOTE — CARE COORDINATION
Chart reviewed and case reviewed ion IDR. Patient precert pending for [facility] since 12/12 per notes. Authorization is still pending at this time. Spoke with the patient's daughter Marycarmen Elliott and updated her re: above. Per Marycarmen Elliott, the facility told her the patient needs to be  free for 24 hours prior to transition to their building. Spoke with Thomas Sanz, liaison with [address] and confirmed above. She stated this is their policy. Spoke with nursing. Both bedside nurse Monty Braga and charge nurse Ever Valle confirm patient does not have a  at this time and has not while she has been on 85. Patient was placed on a wait list for one, but has not received one. Thomas Sanz with [address] notified. Patient passed video with Pureed diet with thin liquids. Pasrr and ambulance on soft chart. Will continue to follow.      Sean Dodge RN.  Natalya Veliz  [phone]

## 2023-12-15 NOTE — PROGRESS NOTES
PROGRESS NOTE       PATIENT PROBLEM LIST:  Patient Active Problem List   Diagnosis Code    Aortic stenosis I35.0    COPD (chronic obstructive pulmonary disease) ([address] [facility]) J44.9    Hypertension I10    H/O hyperlipidemia Z86.39    Coronary artery disease involving native coronary artery of native heart without angina pectoris I25.10    Acute CVA (cerebrovascular accident) ([facility]) I63.9    Cerebral aneurysm I67.1    Intracranial atherosclerosis I67.2    Stroke-like symptoms R29.90    TIA (transient ischemic attack) G45.9    Acute cystitis with hematuria N30.01    NINA (acute kidney injury) ([address] [facility]) N17.9    Acute cystitis without hematuria N30.00       SUBJECTIVE:  Carmina Rendon states she did not go through the MRI has it scared her and she has a previous history of inability to undergo MRIs. Likewise she has refused to have a two-dimensional cardiogram obtained due to her underlying fearful state. REVIEW OF SYSTEMS:  General ROS: negative for - fatigue, malaise,  weight gain or weight loss  Psychological ROS: negative for - anxiety , depression  Ophthalmic ROS: negative for - decreased vision or visual distortion. ENT ROS: negative  Allergy and Immunology ROS: negative  Hematological and Lymphatic ROS: negative  Endocrine: no heat or cold intolerance and no polyphagia, polydipsia, or polyuria  Respiratory ROS: positive for - hemoptysis, pleuritic pain, and shortness of breath  Cardiovascular ROS: positive for - dyspnea on exertion, irregular heartbeat, murmur, and shortness of breath. Gastrointestinal ROS: no abdominal pain, change in bowel habits, or black or bloody stools  Genito-Urinary ROS: no nocturia, dysuria, trouble voiding, frequency or hematuria  Musculoskeletal ROS: negative for- myalgias, arthralgias, or claudication  Neurological ROS: no TIA or stroke symptoms otherwise no significant change in symptoms or problems since yesterday as documented in previous progress notes.     SCHEDULED MEDICATIONS:

## 2023-12-15 NOTE — PROGRESS NOTES
PROGRESS NOTE       PATIENT PROBLEM LIST:  Patient Active Problem List   Diagnosis Code    Aortic stenosis I35.0    COPD (chronic obstructive pulmonary disease) ([facility]) J44.9    Hypertension I10    H/O hyperlipidemia Z86.39    Coronary artery disease involving native coronary artery of native heart without angina pectoris I25.10    Acute CVA (cerebrovascular accident) ([facility]) I63.9    Cerebral aneurysm I67.1    Intracranial atherosclerosis I67.2    Stroke-like symptoms R29.90    TIA (transient ischemic attack) G45.9    Acute cystitis with hematuria N30.01    NINA (acute kidney injury) ([facility]) N17.9    Acute cystitis without hematuria N30.00       SUBJECTIVE:  Susan Reyes states he feels somewhat better and denies any confusion but obviously still not well oriented. REVIEW OF SYSTEMS:  General ROS: negative for - fatigue, malaise,  weight gain or weight loss  Psychological ROS: negative for - anxiety , depression  Ophthalmic ROS: negative for - decreased vision or visual distortion. ENT ROS: negative  Allergy and Immunology ROS: negative  Hematological and Lymphatic ROS: negative  Endocrine: no heat or cold intolerance and no polyphagia, polydipsia, or polyuria  Respiratory ROS: positive for - shortness of breath  Cardiovascular ROS: positive for - dyspnea on exertion and murmur. Gastrointestinal ROS: no abdominal pain, change in bowel habits, or black or bloody stools  Genito-Urinary ROS: no nocturia, dysuria, trouble voiding, frequency or hematuria  Musculoskeletal ROS: negative for- myalgias, arthralgias, or claudication  Neurological ROS: no TIA or stroke symptoms otherwise no significant change in symptoms or problems since yesterday as documented in previous progress notes.     SCHEDULED MEDICATIONS:   piperacillin-tazobactam  3,375 mg IntraVENous Q8H    sodium chloride flush  5-40 mL IntraVENous 2 times per day    enoxaparin  30 mg SubCUTAneous BID    ascorbic acid  500 mg Oral Daily    aspirin  81 mg Oral (chronic obstructive pulmonary disease) ([address] [facility]) J44.9    Hypertension I10    H/O hyperlipidemia Z86.39    Coronary artery disease involving native coronary artery of native heart without angina pectoris I25.10    Acute CVA (cerebrovascular accident) ([address] [facility]) I63.9    Cerebral aneurysm I67.1    Intracranial atherosclerosis I67.2    Stroke-like symptoms R29.90    TIA (transient ischemic attack) G45.9    Acute cystitis with hematuria N30.01    NINA (acute kidney injury) ([address] [facility]) N17.9    Acute cystitis without hematuria N30.00       RECOMMENDATIONS:  Continue evaluation for potential stroke and obtain a two-dimensional echocardiogram and is suspicious for aortic valve disease and will need to assess left ventricular function and potential source for embolization. based upon the findings of both tests further recommendations will be made. I have spent more than 25 minutes face to face with Lin Dalton and reviewing notes and laboratory data, with greater than 50% of this time instructing and counseling the patient  face to face regarding my findings and recommendations and I have answered all questions as posed to me by Ms. Hutson. Dwayne Lyles, DO FACP,FACC,[facility]      NOTE:  This report was transcribed using voice recognition software.   Every effort was made to ensure accuracy; however, inadvertent computerized transcription errors may be present

## 2023-12-15 NOTE — PLAN OF CARE
Plan of care    MRI brain is negative for acute findings.     Plan:  Repeat ESR and CRP in one week  Continue DAPT   No further Neuro workup at this time  Neurology will sign off please call with questions and concerns

## 2023-12-15 NOTE — PROGRESS NOTES
SPEECH LANGUAGE PATHOLOGY  DAILY PROGRESS NOTE        PATIENT NAME:  Jonas Locke      ROOM:  [id number] :  1945         TODAY'S DATE:  12/15/2023       Patient seen for dysphagia x2      Current Diet Order: ADULT DIET; Dysphagia - Pureed; No Drinking Straws  Results and recommendations of swallowing evaluation reviewed. Pt monitored during mealtime. Tolerating current diet without noted or reported difficulties. Implementation of recommended compensatory strategies were completed with moderate  Oral motor strength/ coordination exercises to improve bolus prep/ control and mastication were completed with  moderate verbal prompts .   BOTR strength/ ROM exercises to reduce pharyngeal residuals and improve epiglottic inversion were completed with moderate verbal prompts  Laryngeal strength/ ROM therapeutic exercises to improve airway protection for the least restrictive PO diet  were completed with moderate verbal prompts        Will continue

## 2023-12-16 LAB
ANION GAP SERPL CALCULATED.3IONS-SCNC: 15 MMOL/L (ref 7–16)
BUN SERPL-MCNC: 8 MG/DL (ref 6–23)
CALCIUM SERPL-MCNC: 9.1 MG/DL (ref 8.6–10.2)
CHLORIDE SERPL-SCNC: 99 MMOL/L (ref 98–107)
CO2 SERPL-SCNC: 29 MMOL/L (ref 22–29)
CREAT SERPL-MCNC: 0.9 MG/DL (ref 0.5–1)
ECHO AO ASC DIAM: 2.8 CM
ECHO AO ASCENDING AORTA INDEX: 1.35 CM/M2
ECHO AV AREA PEAK VELOCITY: 1.1 CM2
ECHO AV AREA VTI: 1.2 CM2
ECHO AV AREA/BSA PEAK VELOCITY: 0.5 CM2/M2
ECHO AV AREA/BSA VTI: 0.6 CM2/M2
ECHO AV CUSP MM: 1.5 CM
ECHO AV MEAN GRADIENT: 17 MMHG
ECHO AV MEAN VELOCITY: 2 M/S
ECHO AV PEAK GRADIENT: 31 MMHG
ECHO AV PEAK VELOCITY: 2.8 M/S
ECHO AV VELOCITY RATIO: 0.39
ECHO AV VTI: 66.1 CM
ECHO BSA: 2.17 M2
ECHO LA VOL A-L A2C: 66 ML (ref 22–52)
ECHO LA VOL A-L A4C: 67 ML (ref 22–52)
ECHO LA VOL MOD A2C: 64 ML (ref 22–52)
ECHO LA VOL MOD A4C: 64 ML (ref 22–52)
ECHO LA VOLUME AREA LENGTH: 67 ML
ECHO LA VOLUME INDEX A-L A2C: 32 ML/M2 (ref 16–34)
ECHO LA VOLUME INDEX A-L A4C: 32 ML/M2 (ref 16–34)
ECHO LA VOLUME INDEX AREA LENGTH: 32 ML/M2 (ref 16–34)
ECHO LA VOLUME INDEX MOD A2C: 31 ML/M2 (ref 16–34)
ECHO LA VOLUME INDEX MOD A4C: 31 ML/M2 (ref 16–34)
ECHO LV EDV A2C: 116 ML
ECHO LV EDV A4C: 74 ML
ECHO LV EDV BP: 97 ML (ref 56–104)
ECHO LV EDV INDEX A4C: 36 ML/M2
ECHO LV EDV INDEX BP: 47 ML/M2
ECHO LV EDV NDEX A2C: 56 ML/M2
ECHO LV EF PHYSICIAN: 66 %
ECHO LV EJECTION FRACTION A2C: 66 %
ECHO LV EJECTION FRACTION A4C: 70 %
ECHO LV EJECTION FRACTION BIPLANE: 55 % (ref 55–100)
ECHO LV ESV A2C: 39 ML
ECHO LV ESV A4C: 22 ML
ECHO LV ESV BP: 43 ML (ref 19–49)
ECHO LV ESV INDEX A2C: 19 ML/M2
ECHO LV ESV INDEX A4C: 11 ML/M2
ECHO LV ESV INDEX BP: 21 ML/M2
ECHO LV FRACTIONAL SHORTENING: 20 % (ref 28–44)
ECHO LV INTERNAL DIMENSION DIASTOLE INDEX: 1.68 CM/M2
ECHO LV INTERNAL DIMENSION DIASTOLIC: 3.5 CM (ref 3.9–5.3)
ECHO LV INTERNAL DIMENSION SYSTOLIC INDEX: 1.35 CM/M2
ECHO LV INTERNAL DIMENSION SYSTOLIC: 2.8 CM
ECHO LV ISOVOLUMETRIC RELAXATION TIME (IVRT): 85.4 MS
ECHO LV IVSD: 1.7 CM (ref 0.6–0.9)
ECHO LV IVSS: 2.1 CM
ECHO LV MASS 2D: 215.2 G (ref 67–162)
ECHO LV MASS INDEX 2D: 103.4 G/M2 (ref 43–95)
ECHO LV POSTERIOR WALL DIASTOLIC: 1.5 CM (ref 0.6–0.9)
ECHO LV POSTERIOR WALL SYSTOLIC: 1.3 CM
ECHO LV RELATIVE WALL THICKNESS RATIO: 0.86
ECHO LVOT AREA: 2.5 CM2
ECHO LVOT AV VTI INDEX: 0.48
ECHO LVOT DIAM: 1.8 CM
ECHO LVOT MEAN GRADIENT: 3 MMHG
ECHO LVOT PEAK GRADIENT: 5 MMHG
ECHO LVOT PEAK VELOCITY: 1.1 M/S
ECHO LVOT STROKE VOLUME INDEX: 39 ML/M2
ECHO LVOT SV: 81.1 ML
ECHO LVOT VTI: 31.9 CM
ECHO MV A VELOCITY: 1.39 M/S
ECHO MV AREA PHT: 1.6 CM2
ECHO MV AREA VTI: 1.5 CM2
ECHO MV E DECELERATION TIME (DT): 371.7 MS
ECHO MV E VELOCITY: 1.25 M/S
ECHO MV E/A RATIO: 0.9
ECHO MV LVOT VTI INDEX: 1.65
ECHO MV MAX VELOCITY: 1.4 M/S
ECHO MV MEAN GRADIENT: 3 MMHG
ECHO MV MEAN VELOCITY: 0.7 M/S
ECHO MV PEAK GRADIENT: 8 MMHG
ECHO MV PRESSURE HALF TIME (PHT): 135.8 MS
ECHO MV VTI: 52.7 CM
ECHO PVEIN A DURATION: 117.7 MS
ECHO PVEIN A VELOCITY: 0.3 M/S
ECHO PVEIN PEAK D VELOCITY: 0.6 M/S
ECHO PVEIN PEAK S VELOCITY: 0.6 M/S
ECHO PVEIN S/D RATIO: 1
ECHO RV INTERNAL DIMENSION: 2.8 CM
ECHO RV TAPSE: 1.4 CM (ref 1.7–?)
ECHO TV REGURGITANT MAX VELOCITY: 2.33 M/S
ECHO TV REGURGITANT PEAK GRADIENT: 22 MMHG
ERYTHROCYTE [DISTWIDTH] IN BLOOD BY AUTOMATED COUNT: 12 % (ref 11.5–15)
GFR SERPL CREATININE-BSD FRML MDRD: >60 ML/MIN/1.73M2
GLUCOSE SERPL-MCNC: 86 MG/DL (ref 74–99)
HCT VFR BLD AUTO: 31.6 % (ref 34–48)
HGB BLD-MCNC: 9.9 G/DL (ref 11.5–15.5)
MCH RBC QN AUTO: 30.4 PG (ref 26–35)
MCHC RBC AUTO-ENTMCNC: 31.3 G/DL (ref 32–34.5)
MCV RBC AUTO: 96.9 FL (ref 80–99.9)
MICROORGANISM SPEC CULT: NORMAL
PLATELET # BLD AUTO: 343 K/UL (ref 130–450)
PMV BLD AUTO: 10.3 FL (ref 7–12)
POTASSIUM SERPL-SCNC: 3.2 MMOL/L (ref 3.5–5)
RBC # BLD AUTO: 3.26 M/UL (ref 3.5–5.5)
SERVICE CMNT-IMP: NORMAL
SODIUM SERPL-SCNC: 143 MMOL/L (ref 132–146)
SPECIMEN DESCRIPTION: NORMAL
WBC OTHER # BLD: 7 K/UL (ref 4.5–11.5)

## 2023-12-16 PROCEDURE — 85027 COMPLETE CBC AUTOMATED: CPT

## 2023-12-16 PROCEDURE — 80048 BASIC METABOLIC PNL TOTAL CA: CPT

## 2023-12-16 PROCEDURE — 2580000003 HC RX 258: Performed by: INTERNAL MEDICINE

## 2023-12-16 PROCEDURE — 99222 1ST HOSP IP/OBS MODERATE 55: CPT | Performed by: STUDENT IN AN ORGANIZED HEALTH CARE EDUCATION/TRAINING PROGRAM

## 2023-12-16 PROCEDURE — 36415 COLL VENOUS BLD VENIPUNCTURE: CPT

## 2023-12-16 PROCEDURE — 6360000002 HC RX W HCPCS: Performed by: INTERNAL MEDICINE

## 2023-12-16 PROCEDURE — 94640 AIRWAY INHALATION TREATMENT: CPT

## 2023-12-16 PROCEDURE — 2700000000 HC OXYGEN THERAPY PER DAY

## 2023-12-16 PROCEDURE — 94660 CPAP INITIATION&MGMT: CPT

## 2023-12-16 PROCEDURE — 6370000000 HC RX 637 (ALT 250 FOR IP): Performed by: INTERNAL MEDICINE

## 2023-12-16 PROCEDURE — 6370000000 HC RX 637 (ALT 250 FOR IP): Performed by: NURSE PRACTITIONER

## 2023-12-16 PROCEDURE — 2060000000 HC ICU INTERMEDIATE R&B

## 2023-12-16 PROCEDURE — 6370000000 HC RX 637 (ALT 250 FOR IP): Performed by: STUDENT IN AN ORGANIZED HEALTH CARE EDUCATION/TRAINING PROGRAM

## 2023-12-16 RX ADMIN — PANTOPRAZOLE SODIUM 40 MG: 40 TABLET, DELAYED RELEASE ORAL at 05:39

## 2023-12-16 RX ADMIN — ATORVASTATIN CALCIUM 40 MG: 40 TABLET, FILM COATED ORAL at 21:39

## 2023-12-16 RX ADMIN — Medication 10 ML: at 21:38

## 2023-12-16 RX ADMIN — AMOXICILLIN AND CLAVULANATE POTASSIUM 1 TABLET: 875; 125 TABLET, FILM COATED ORAL at 09:47

## 2023-12-16 RX ADMIN — EZETIMIBE 10 MG: 10 TABLET ORAL at 09:47

## 2023-12-16 RX ADMIN — LOPERAMIDE HYDROCHLORIDE 2 MG: 2 CAPSULE ORAL at 16:51

## 2023-12-16 RX ADMIN — ENOXAPARIN SODIUM 30 MG: 100 INJECTION SUBCUTANEOUS at 21:40

## 2023-12-16 RX ADMIN — LOPERAMIDE HYDROCHLORIDE 2 MG: 2 CAPSULE ORAL at 13:44

## 2023-12-16 RX ADMIN — ENOXAPARIN SODIUM 30 MG: 100 INJECTION SUBCUTANEOUS at 09:45

## 2023-12-16 RX ADMIN — ASPIRIN 81 MG CHEWABLE TABLET 81 MG: 81 TABLET CHEWABLE at 09:46

## 2023-12-16 RX ADMIN — Medication 500 MG: at 09:46

## 2023-12-16 RX ADMIN — FUROSEMIDE 20 MG: 40 TABLET ORAL at 09:46

## 2023-12-16 RX ADMIN — POTASSIUM BICARBONATE 40 MEQ: 782 TABLET, EFFERVESCENT ORAL at 09:53

## 2023-12-16 RX ADMIN — CLOPIDOGREL BISULFATE 75 MG: 75 TABLET ORAL at 09:46

## 2023-12-16 RX ADMIN — AMOXICILLIN AND CLAVULANATE POTASSIUM 1 TABLET: 875; 125 TABLET, FILM COATED ORAL at 21:40

## 2023-12-16 RX ADMIN — PAROXETINE 30 MG: 10 TABLET, FILM COATED ORAL at 09:47

## 2023-12-16 RX ADMIN — DOXEPIN HYDROCHLORIDE 100 MG: 50 CAPSULE ORAL at 20:36

## 2023-12-16 RX ADMIN — BUDESONIDE INHALATION 1000 MCG: 0.5 SUSPENSION RESPIRATORY (INHALATION) at 20:34

## 2023-12-16 RX ADMIN — CLONAZEPAM 1 MG: 0.5 TABLET ORAL at 16:51

## 2023-12-16 RX ADMIN — Medication 10 ML: at 09:47

## 2023-12-16 RX ADMIN — BUDESONIDE INHALATION 1000 MCG: 0.5 SUSPENSION RESPIRATORY (INHALATION) at 08:31

## 2023-12-16 RX ADMIN — MIRTAZAPINE 15 MG: 15 TABLET, FILM COATED ORAL at 21:39

## 2023-12-16 RX ADMIN — OXYCODONE AND ACETAMINOPHEN 1 TABLET: 5; 325 TABLET ORAL at 09:46

## 2023-12-16 RX ADMIN — METOPROLOL TARTRATE 50 MG: 50 TABLET ORAL at 09:45

## 2023-12-16 RX ADMIN — CLONAZEPAM 1 MG: 0.5 TABLET ORAL at 09:45

## 2023-12-16 RX ADMIN — LISINOPRIL 30 MG: 10 TABLET ORAL at 09:45

## 2023-12-16 NOTE — PROGRESS NOTES
PROGRESS NOTE       PATIENT PROBLEM LIST:  Patient Active Problem List   Diagnosis Code    Aortic stenosis I35.0    COPD (chronic obstructive pulmonary disease) ([address] [facility]) J44.9    Hypertension I10    H/O hyperlipidemia Z86.39    Coronary artery disease involving native coronary artery of native heart without angina pectoris I25.10    Acute CVA (cerebrovascular accident) ([facility]) I63.9    Cerebral aneurysm I67.1    Intracranial atherosclerosis I67.2    Stroke-like symptoms R29.90    TIA (transient ischemic attack) G45.9    Acute cystitis with hematuria N30.01    NINA (acute kidney injury) ([address] [facility]) N17.9    Acute cystitis without hematuria N30.00       SUBJECTIVE:  Balbir Parker Was resting comfortably when I walked into the room and woke up with a smile greeting me and voicing no complaints of shortness of breath, lightheadedness, palpitations or chest discomfort. Two-dimensional echocardiogram completed and interpreted. Noemi Sanchez REVIEW OF SYSTEMS:  General ROS: negative for - fatigue, malaise,  weight gain or weight loss  Psychological ROS: negative for - anxiety , depression. Positive for - intermittent confusion. Ophthalmic ROS: negative for - decreased vision or visual distortion. ENT ROS: negative  Allergy and Immunology ROS: negative  Hematological and Lymphatic ROS: negative  Endocrine: no heat or cold intolerance and no polyphagia, polydipsia, or polyuria  Respiratory ROS:negative for - hemoptysis, orthopnea, pleuritic pain, and wheezing  Cardiovascular ROS: positive for - dyspnea on exertion and murmur. Gastrointestinal ROS: no abdominal pain, change in bowel habits, or black or bloody stools  Genito-Urinary ROS: no nocturia, dysuria, trouble voiding, frequency or hematuria  Musculoskeletal ROS: negative for- myalgias, arthralgias, or claudication  Neurological ROS: no TIA or stroke symptoms otherwise no significant change in symptoms or problems since yesterday as documented in previous progress notes.     SCHEDULED

## 2023-12-16 NOTE — PLAN OF CARE
Problem: ABCDS Injury Assessment  Goal: Absence of physical injury  Outcome: Progressing     Problem: Safety - Adult  Goal: Free from fall injury  Outcome: Progressing     Problem: Chronic Conditions and Co-morbidities  Goal: Patient's chronic conditions and co-morbidity symptoms are monitored and maintained or improved  Outcome: Progressing     Problem: Discharge Planning  Goal: Discharge to home or other facility with appropriate resources  Outcome: Progressing     Problem: Skin/Tissue Integrity  Goal: Absence of new skin breakdown  Description: 1. Monitor for areas of redness and/or skin breakdown  2. Assess vascular access sites hourly  3. Every 4-6 hours minimum:  Change oxygen saturation probe site  4. Every 4-6 hours:  If on nasal continuous positive airway pressure, respiratory therapy assess nares and determine need for appliance change or resting period.   Outcome: Progressing     Problem: Pain  Goal: Verbalizes/displays adequate comfort level or baseline comfort level  Outcome: Progressing     Problem: Neurosensory - Adult  Goal: Achieves stable or improved neurological status  Outcome: Progressing  Goal: Remains free of injury related to seizures activity  Outcome: Progressing

## 2023-12-16 NOTE — PROGRESS NOTES
PROGRESS NOTE       PATIENT PROBLEM LIST:  Patient Active Problem List   Diagnosis Code    Aortic stenosis I35.0    COPD (chronic obstructive pulmonary disease) ([facility]) J44.9    Hypertension I10    H/O hyperlipidemia Z86.39    Coronary artery disease involving native coronary artery of native heart without angina pectoris I25.10    Acute CVA (cerebrovascular accident) ([facility]) I63.9    Cerebral aneurysm I67.1    Intracranial atherosclerosis I67.2    Stroke-like symptoms R29.90    TIA (transient ischemic attack) G45.9    Acute cystitis with hematuria N30.01    NINA (acute kidney injury) ([facility]) N17.9    Acute cystitis without hematuria N30.00       SUBJECTIVE:  Germain Torre states she feels better this evening and probably noted that she was able to complete her MRI without incident. Interestingly MRI apparently without significant findings. REVIEW OF SYSTEMS:  General ROS: negative for - fatigue, malaise,  weight gain or weight loss  Psychological ROS: negative for - anxiety , depression  Ophthalmic ROS: negative for - decreased vision or visual distortion. ENT ROS: negative  Allergy and Immunology ROS: negative  Hematological and Lymphatic ROS: negative  Endocrine: no heat or cold intolerance and no polyphagia, polydipsia, or polyuria  Respiratory ROS:negative for - hemoptysis, orthopnea, pleuritic pain, and wheezing  Cardiovascular ROS: positive for - dyspnea on exertion and murmur. Gastrointestinal ROS: no abdominal pain, change in bowel habits, or black or bloody stools  Genito-Urinary ROS: no nocturia, dysuria, trouble voiding, frequency or hematuria  Musculoskeletal ROS: negative for- myalgias, arthralgias, or claudication  Neurological ROS: no TIA or stroke symptoms otherwise no significant change in symptoms or problems since yesterday as documented in previous progress notes.     SCHEDULED MEDICATIONS:   amoxicillin-clavulanate  1 tablet Oral 2 times per day    metoprolol tartrate  50 mg Oral BID Active Problem List   Diagnosis Code    Aortic stenosis I35.0    COPD (chronic obstructive pulmonary disease) ([address] [facility]) J44.9    Hypertension I10    H/O hyperlipidemia Z86.39    Coronary artery disease involving native coronary artery of native heart without angina pectoris I25.10    Acute CVA (cerebrovascular accident) ([address] [facility]) I63.9    Cerebral aneurysm I67.1    Intracranial atherosclerosis I67.2    Stroke-like symptoms R29.90    TIA (transient ischemic attack) G45.9    Acute cystitis with hematuria N30.01    NINA (acute kidney injury) ([address] [facility]) N17.9    Acute cystitis without hematuria N30.00       RECOMMENDATIONS:  We will review two-dimensional echocardiogram and make further cardiac recommendations and based upon results. Fortunately she was able to withstand her MRI earlier. let spironolactone once daily and readjust once daily echo is available for interpretation. I have spent more than 25 minutes face to face with Carmina Rendon and reviewing notes and laboratory data, with greater than 50% of this time instructing and counseling the patient  face to face regarding my findings and recommendations and I have answered all questions as posed to me by Ms. Hutson. Cipriano Espino, DO FACP,FACC,FSCAI      NOTE:  This report was transcribed using voice recognition software.   Every effort was made to ensure accuracy; however, inadvertent computerized transcription errors may be present

## 2023-12-17 PROCEDURE — 99232 SBSQ HOSP IP/OBS MODERATE 35: CPT | Performed by: STUDENT IN AN ORGANIZED HEALTH CARE EDUCATION/TRAINING PROGRAM

## 2023-12-17 PROCEDURE — 6370000000 HC RX 637 (ALT 250 FOR IP): Performed by: STUDENT IN AN ORGANIZED HEALTH CARE EDUCATION/TRAINING PROGRAM

## 2023-12-17 PROCEDURE — 94640 AIRWAY INHALATION TREATMENT: CPT

## 2023-12-17 PROCEDURE — 2060000000 HC ICU INTERMEDIATE R&B

## 2023-12-17 PROCEDURE — 6370000000 HC RX 637 (ALT 250 FOR IP): Performed by: INTERNAL MEDICINE

## 2023-12-17 PROCEDURE — 2700000000 HC OXYGEN THERAPY PER DAY

## 2023-12-17 PROCEDURE — 97530 THERAPEUTIC ACTIVITIES: CPT

## 2023-12-17 PROCEDURE — 6370000000 HC RX 637 (ALT 250 FOR IP): Performed by: NURSE PRACTITIONER

## 2023-12-17 PROCEDURE — 94660 CPAP INITIATION&MGMT: CPT

## 2023-12-17 PROCEDURE — 2580000003 HC RX 258: Performed by: INTERNAL MEDICINE

## 2023-12-17 PROCEDURE — 97535 SELF CARE MNGMENT TRAINING: CPT

## 2023-12-17 PROCEDURE — 6360000002 HC RX W HCPCS: Performed by: INTERNAL MEDICINE

## 2023-12-17 RX ADMIN — ATORVASTATIN CALCIUM 40 MG: 40 TABLET, FILM COATED ORAL at 21:11

## 2023-12-17 RX ADMIN — Medication 500 MG: at 08:42

## 2023-12-17 RX ADMIN — OXYCODONE AND ACETAMINOPHEN 1 TABLET: 5; 325 TABLET ORAL at 08:41

## 2023-12-17 RX ADMIN — MIRTAZAPINE 15 MG: 15 TABLET, FILM COATED ORAL at 21:12

## 2023-12-17 RX ADMIN — PAROXETINE 30 MG: 10 TABLET, FILM COATED ORAL at 08:43

## 2023-12-17 RX ADMIN — CLONAZEPAM 1 MG: 0.5 TABLET ORAL at 08:42

## 2023-12-17 RX ADMIN — LOPERAMIDE HYDROCHLORIDE 2 MG: 2 CAPSULE ORAL at 21:11

## 2023-12-17 RX ADMIN — CLONAZEPAM 1 MG: 0.5 TABLET ORAL at 16:10

## 2023-12-17 RX ADMIN — CLOPIDOGREL BISULFATE 75 MG: 75 TABLET ORAL at 08:42

## 2023-12-17 RX ADMIN — ASPIRIN 81 MG CHEWABLE TABLET 81 MG: 81 TABLET CHEWABLE at 08:42

## 2023-12-17 RX ADMIN — ENOXAPARIN SODIUM 30 MG: 100 INJECTION SUBCUTANEOUS at 08:41

## 2023-12-17 RX ADMIN — Medication 10 ML: at 08:42

## 2023-12-17 RX ADMIN — DOXEPIN HYDROCHLORIDE 100 MG: 50 CAPSULE ORAL at 21:11

## 2023-12-17 RX ADMIN — LISINOPRIL 30 MG: 10 TABLET ORAL at 08:41

## 2023-12-17 RX ADMIN — OXYCODONE AND ACETAMINOPHEN 1 TABLET: 5; 325 TABLET ORAL at 04:38

## 2023-12-17 RX ADMIN — PANTOPRAZOLE SODIUM 40 MG: 40 TABLET, DELAYED RELEASE ORAL at 05:57

## 2023-12-17 RX ADMIN — METOPROLOL TARTRATE 50 MG: 50 TABLET ORAL at 08:42

## 2023-12-17 RX ADMIN — FUROSEMIDE 20 MG: 40 TABLET ORAL at 08:42

## 2023-12-17 RX ADMIN — ENOXAPARIN SODIUM 30 MG: 100 INJECTION SUBCUTANEOUS at 21:12

## 2023-12-17 RX ADMIN — BUDESONIDE INHALATION 1000 MCG: 0.5 SUSPENSION RESPIRATORY (INHALATION) at 20:29

## 2023-12-17 RX ADMIN — EZETIMIBE 10 MG: 10 TABLET ORAL at 08:43

## 2023-12-17 RX ADMIN — AMOXICILLIN AND CLAVULANATE POTASSIUM 1 TABLET: 875; 125 TABLET, FILM COATED ORAL at 21:11

## 2023-12-17 RX ADMIN — AMOXICILLIN AND CLAVULANATE POTASSIUM 1 TABLET: 875; 125 TABLET, FILM COATED ORAL at 08:43

## 2023-12-17 RX ADMIN — Medication 10 ML: at 21:11

## 2023-12-17 NOTE — PROGRESS NOTES
Bathroom setup: tub/shower however has been sponge bathing recently   Equipment owned: w/w, home O2 (continuous 4L)     Prior Level of Function: assist w/ bathing, independent/mod I with additional ADLs , assist with IADLs; ambulated w/ family assist or furniture walks   Driving: no     Pain Level: No pain reported at this time     Cognition: A&O: 3/4   year/month); Follows 1 step directions           Memory:  fair            Sequencing:  fair            Problem solving:  fair            Judgement/safety:  fair              Functional Assessment:  AM-PAC Daily Activity Raw Score: 15/24    Initial Eval Status  Date: 12/12/23 Treatment Status  Date:12/17/23 STGs = LTGs  Time frame: 10-14 days   Feeding Minimal Assist  Set up Modified [place]    Grooming Minimal Assist   Seated EOB to wash face and hands  Min A   To wash face seated Modified [place]    UB Dressing Moderate Assist   Min A   Per last tx Supervision    LB Dressing Maximal Assist   B socks - figure four technique Max A  To don/doff socks and brief seated EOB and standing to pull over hips Minimal Assist    Bathing Moderate Assist  Mod A  Simulated seated and standing for posterior Minimal Assist    Toileting Maximal Assist   Including hygiene task  Incontinent of bladder and bowel Max A- clothing management Minimal Assist    Bed Mobility  Supine to sit: Minimal Assist   Sit to supine: Moderate Assist  Supine to sit:   Min A  Educated pt on technique to increase independence. Supine to sit: Supervision   Sit to supine: Supervision    Functional Transfers Moderate Assist   Min A - sit<->stand  Cuing for hand placement  Min A- stand pivot transfer using w/w Stand by Assist    Functional Mobility Moderate Assist w/ HHA  Lateral sidesteps to [facility] Min A  Short home distance using w/w Stand by Assist    Balance Sitting:     Static: Min><SBA    Dynamic: Min A  Standing:  Mod A w/ w/w  Sitting:     Static:  SBA    Dynamic:Min A   Standing: Min A Sitting:     Static:  S    Dynamic:SBA  Standing: SBA    Activity Tolerance Fair-  Fatigued very quickly w/ minimal activity. Reinforced frequent rest breaks. fair  Min dizziness throughout session Fair+   Visual/  Perceptual Glasses: no                          Hand Dominance R    AROM (PROM) Strength Additional Info:    RUE  WFL 3+/5 good  and wfl FMC/dexterity noted during ADL tasks         LUE WFL 3+/5 good  and wfl FMC/dexterity noted during ADL tasks         Hearing: WFL  Sensation:  No c/o numbness or tingling  Tone: WFL  Edema: none noted      Comments: Upon arrival pt supine in bed. Pt educated on techniques to increase independence and safety during ADL's, bed mobility, and functional transfers. At end of session pt seated in bedside chair, call light within reach, chair alarm on. Pt has made fair progress towards set goals.      Continue with current plan of care    Treatment Time In: 8:02            Treatment Time Out: 8:25             Treatment Charges: Mins Units   Ther Ex  42294     Manual Therapy [address]     Thera Activities [id number] 13 1   ADL/Home t 08451 10 1   Neuro Re-ed 19501     Group Therapy      Orthotic manage/training  86037     Non-Billable Time     Total Timed Treatment 23 2   [facility]

## 2023-12-17 NOTE — PROGRESS NOTES
Physical Therapy  Physical Therapy Treatment    Name: Susan Reyes  :   MRN: [id number]      Date of Service: 2023    Evaluating PT:  Priya Vega PT, DPZEESHAN [id number]    Room #:  9842/0200-Y  Diagnosis:  TIA (transient ischemic attack) [G45.9]  Acute cystitis with hematuria [N30.01]  Stroke-like symptoms [R29.90]  PMHx/PSHx:   has a past medical history of Anxiety and depression, Arthritis, Asthma, Borderline diabetes, CAD (coronary artery disease), CHF (congestive heart failure) ([address]), Chronic back pain, COPD (chronic obstructive pulmonary disease) ([address]), Diverticulosis, GERD (gastroesophageal reflux disease), Hyperlipidemia, Hypertension, Left rotator cuff tear, Pneumonia, PONV (postoperative nausea and vomiting), Prolonged emergence from general anesthesia, and TIA (transient ischemic attack). has a past surgical history that includes Dental surgery; Hemorrhoid surgery; Appendectomy; Diagnostic Cardiac Cath Lab Procedure (07/26/10); Diagnostic Cardiac Cath Lab Procedure (10/04/10); Diagnostic Cardiac Cath Lab Procedure (11); Coronary angioplasty (10/04/10); Aortic valve replacement (11); Chest surgery (11); Cardiac catheterization (Right, 2013); ECHO Transesophageal (2014); Cardiac catheterization (2/10/15); Hysterectomy (); Abdomen surgery (); eye surgery; Colonoscopy; Endoscopy, colon, diagnostic; brain surgery (); and Cardiac valve replacement (6/17/15). Procedure/Surgery:  None  Precautions:  Falls, bed/chair alarm, O2, cognition, impulsive  Equipment Needs:  TBD    SUBJECTIVE:    Pt lives with daughter and son-in-law in a 2 story home with 4 stair(s) to enter and 1 rail(s). Bed is on 2nd floor and bath is on 2nd floor. Full flight of stairs with 0 rails to 2nd floor. Pt ambulated with no AD prior to admission. Pt received assistance with functional mobility prior to admission.     OBJECTIVE:   Re-Evaluation  Date: 23 Treatment  2023 All needs met and call light in reach. All lines remained intact. Treatment:  Patient practiced and was instructed in the following treatment:    Bed Mobility: VCs provided for sequencing and safety during mobility. Manual assist provided for completion of task. Transfer Training: Verbal and tactile cueing provided for sequencing and safety during mobility. Manual assist provided for completion of task  Therapeutic Activities: pt sat EOB for approximately 8 minutes during session with no assist for static and dynamic sitting balance. Gait Training: Ambulation with [employer] and verbal cues for proper technique and safety. Manual assist provided for completion of task     PLAN:    Patient is making good progress towards established goals. Will continue with current POC.       Time in  0800  Time out  0825    Total Treatment Time  25 minutes     CPT codes:  [] Gait training 38759 - minutes  [] Manual therapy 61020 - minutes  [x] Therapeutic activities 88369 25 minutes  [] Therapeutic exercises 87886 - minutes  [] Neuromuscular reeducation 30199 - minutes    Ambreen Doshi PT, DPT  [id number]

## 2023-12-17 NOTE — PROGRESS NOTES
Dr. Patel De La Rosa notified of patient heart rate sustaining 45-50's, /57. Heart rate mid 60's prior to am metoprolol.

## 2023-12-18 ENCOUNTER — APPOINTMENT (OUTPATIENT)
Dept: GENERAL RADIOLOGY | Age: 78
DRG: 720 | End: 2023-12-18
Payer: COMMERCIAL

## 2023-12-18 PROCEDURE — 6370000000 HC RX 637 (ALT 250 FOR IP): Performed by: NURSE PRACTITIONER

## 2023-12-18 PROCEDURE — 2700000000 HC OXYGEN THERAPY PER DAY

## 2023-12-18 PROCEDURE — 6370000000 HC RX 637 (ALT 250 FOR IP): Performed by: INTERNAL MEDICINE

## 2023-12-18 PROCEDURE — 2580000003 HC RX 258: Performed by: INTERNAL MEDICINE

## 2023-12-18 PROCEDURE — 92526 ORAL FUNCTION THERAPY: CPT | Performed by: SPEECH-LANGUAGE PATHOLOGIST

## 2023-12-18 PROCEDURE — 92611 MOTION FLUOROSCOPY/SWALLOW: CPT | Performed by: SPEECH-LANGUAGE PATHOLOGIST

## 2023-12-18 PROCEDURE — 2500000003 HC RX 250 WO HCPCS: Performed by: PHYSICIAN ASSISTANT

## 2023-12-18 PROCEDURE — 74230 X-RAY XM SWLNG FUNCJ C+: CPT

## 2023-12-18 PROCEDURE — 94640 AIRWAY INHALATION TREATMENT: CPT

## 2023-12-18 PROCEDURE — 2060000000 HC ICU INTERMEDIATE R&B

## 2023-12-18 PROCEDURE — 6370000000 HC RX 637 (ALT 250 FOR IP): Performed by: STUDENT IN AN ORGANIZED HEALTH CARE EDUCATION/TRAINING PROGRAM

## 2023-12-18 PROCEDURE — 94660 CPAP INITIATION&MGMT: CPT

## 2023-12-18 PROCEDURE — 99222 1ST HOSP IP/OBS MODERATE 55: CPT | Performed by: STUDENT IN AN ORGANIZED HEALTH CARE EDUCATION/TRAINING PROGRAM

## 2023-12-18 PROCEDURE — 6360000002 HC RX W HCPCS: Performed by: INTERNAL MEDICINE

## 2023-12-18 RX ADMIN — BARIUM SULFATE 15 ML: 400 SUSPENSION ORAL at 10:18

## 2023-12-18 RX ADMIN — CLONAZEPAM 1 MG: 0.5 TABLET ORAL at 17:29

## 2023-12-18 RX ADMIN — FUROSEMIDE 20 MG: 40 TABLET ORAL at 07:30

## 2023-12-18 RX ADMIN — EZETIMIBE 10 MG: 10 TABLET ORAL at 07:30

## 2023-12-18 RX ADMIN — PAROXETINE 30 MG: 10 TABLET, FILM COATED ORAL at 07:30

## 2023-12-18 RX ADMIN — BARIUM SULFATE 15 ML: 400 PASTE ORAL at 10:18

## 2023-12-18 RX ADMIN — CLONAZEPAM 1 MG: 0.5 TABLET ORAL at 07:30

## 2023-12-18 RX ADMIN — DOXEPIN HYDROCHLORIDE 100 MG: 50 CAPSULE ORAL at 21:06

## 2023-12-18 RX ADMIN — BUDESONIDE INHALATION 1000 MCG: 0.5 SUSPENSION RESPIRATORY (INHALATION) at 20:32

## 2023-12-18 RX ADMIN — PANTOPRAZOLE SODIUM 40 MG: 40 TABLET, DELAYED RELEASE ORAL at 06:27

## 2023-12-18 RX ADMIN — LOPERAMIDE HYDROCHLORIDE 2 MG: 2 CAPSULE ORAL at 07:30

## 2023-12-18 RX ADMIN — ACETAMINOPHEN 650 MG: 325 TABLET ORAL at 21:06

## 2023-12-18 RX ADMIN — METOPROLOL TARTRATE 25 MG: 25 TABLET, FILM COATED ORAL at 07:30

## 2023-12-18 RX ADMIN — CLOPIDOGREL BISULFATE 75 MG: 75 TABLET ORAL at 07:30

## 2023-12-18 RX ADMIN — AMOXICILLIN AND CLAVULANATE POTASSIUM 1 TABLET: 875; 125 TABLET, FILM COATED ORAL at 07:30

## 2023-12-18 RX ADMIN — Medication 10 ML: at 21:08

## 2023-12-18 RX ADMIN — MIRTAZAPINE 15 MG: 15 TABLET, FILM COATED ORAL at 21:08

## 2023-12-18 RX ADMIN — BUDESONIDE INHALATION 1000 MCG: 0.5 SUSPENSION RESPIRATORY (INHALATION) at 08:25

## 2023-12-18 RX ADMIN — AMOXICILLIN AND CLAVULANATE POTASSIUM 1 TABLET: 875; 125 TABLET, FILM COATED ORAL at 21:07

## 2023-12-18 RX ADMIN — BARIUM SULFATE 15 ML: 0.81 POWDER, FOR SUSPENSION ORAL at 10:18

## 2023-12-18 RX ADMIN — Medication 10 ML: at 07:31

## 2023-12-18 RX ADMIN — LISINOPRIL 30 MG: 10 TABLET ORAL at 07:30

## 2023-12-18 RX ADMIN — ENOXAPARIN SODIUM 30 MG: 100 INJECTION SUBCUTANEOUS at 21:08

## 2023-12-18 RX ADMIN — ENOXAPARIN SODIUM 30 MG: 100 INJECTION SUBCUTANEOUS at 07:31

## 2023-12-18 RX ADMIN — ASPIRIN 81 MG CHEWABLE TABLET 81 MG: 81 TABLET CHEWABLE at 07:30

## 2023-12-18 RX ADMIN — ATORVASTATIN CALCIUM 40 MG: 40 TABLET, FILM COATED ORAL at 21:08

## 2023-12-18 RX ADMIN — Medication 500 MG: at 07:30

## 2023-12-18 RX ADMIN — POTASSIUM BICARBONATE 40 MEQ: 782 TABLET, EFFERVESCENT ORAL at 17:29

## 2023-12-18 NOTE — PROGRESS NOTES
PROGRESS NOTE       PATIENT PROBLEM LIST:  Patient Active Problem List   Diagnosis Code    Aortic stenosis I35.0    COPD (chronic obstructive pulmonary disease) ([facility]) J44.9    Hypertension I10    H/O hyperlipidemia Z86.39    Coronary artery disease involving native coronary artery of native heart without angina pectoris I25.10    Acute CVA (cerebrovascular accident) ([facility]) I63.9    Cerebral aneurysm I67.1    Intracranial atherosclerosis I67.2    Stroke-like symptoms R29.90    TIA (transient ischemic attack) G45.9    Acute cystitis with hematuria N30.01    NINA (acute kidney injury) ([facility]) N17.9    Acute cystitis without hematuria N30.00       SUBJECTIVE:  Roque Hutson medially engaged in conversation upon entering her room. She wants to know when she can eat more. She denies any chest discomfort, palpitations nor shortness of breath. In the presence of diuretic hypokalemia remains present. REVIEW OF SYSTEMS:  General ROS: negative for - fatigue, malaise,  weight gain or weight loss  Psychological ROS: negative for - anxiety , depression. Positive for - intermittent confusion. Ophthalmic ROS: negative for - decreased vision or visual distortion. ENT ROS: negative  Allergy and Immunology ROS: negative  Hematological and Lymphatic ROS: negative  Endocrine: no heat or cold intolerance and no polyphagia, polydipsia, or polyuria  Respiratory ROS:negative for - hemoptysis, orthopnea, pleuritic pain, and wheezing  Cardiovascular ROS: positive for - dyspnea on exertion and murmur. Gastrointestinal ROS: no abdominal pain, change in bowel habits, or black or bloody stools  Genito-Urinary ROS: no nocturia, dysuria, trouble voiding, frequency or hematuria  Musculoskeletal ROS: negative for- myalgias, arthralgias, or claudication  Neurological ROS: no TIA or stroke symptoms otherwise no significant change in symptoms or problems since yesterday as documented in previous progress notes.     SCHEDULED MEDICATIONS: visually estimated EF of 65 - 70%. Left ventricle size is normal. Mildly increased wall thickness. Findings consistent with mild asymmetric hypertrophy. Normal wall motion. Normal diastolic function. Aortic Valve: Mild stenosis of the aortic valve. AV mean gradient is 17 mmHg. AV area by continuity VTI is 1.2 cm2. Mitral Valve: Mildly calcified leaflet. Calcified nodular density at chordal anterior leaflet interface. Interatrial Septum: Agitated saline study was negative with and without provocation. Pericardium: Small (<1 cm) pericardial effusion present. No indication of cardiac tamponade. Image quality is adequate. Technically difficult study due to patient's body habitus. IMPRESSIONS:  Patient Active Problem List   Diagnosis Code    Aortic stenosis I35.0    COPD (chronic obstructive pulmonary disease) ([facility]) J44.9    Hypertension I10    H/O hyperlipidemia Z86.39    Coronary artery disease involving native coronary artery of native heart without angina pectoris I25.10    Acute CVA (cerebrovascular accident) ([address]) I63.9    Cerebral aneurysm I67.1    Intracranial atherosclerosis I67.2    Stroke-like symptoms R29.90    TIA (transient ischemic attack) G45.9    Acute cystitis with hematuria N30.01    NINA (acute kidney injury) ([address]) N17.9    Acute cystitis without hematuria N30.00       RECOMMENDATIONS:  Excellent synopsis of hospitalization as per Dr. Olegario Garcia. The patient indicates understanding of these issues and agrees with the plan. with recommendations but would consider utilizing clopidogrel in lieu of aspirin based upon most recent cardiac literature for maintaining antiplatelet effect in reducing exposure to potential gastrointestinal side effects. likewise would consider for safety adding low-dose spironolactone in lieu of potassium supplementation for better blood pressure control and preserving potassium stores in the presence of normal renal function. .    I have spent more than 25 minutes

## 2023-12-18 NOTE — CARE COORDINATION
SOCIAL WORK/CASEMANAGEMENT TRANSITION OF CARE PLANNINGSavannah Vaibhav GUILLERMO, [address] [facility]):  Plan is to [facility] where a precert is pending. All discharge paper work with pasrr In place. MBBS pending with speech. Pt is on 4l nc and iv dextrose and fluids. Cardio is following and neurology signed off. Pt is  free since Saturday per rn.  DORINA Hardnig.12/18/2023

## 2023-12-18 NOTE — PROGRESS NOTES
SPEECH/LANGUAGE PATHOLOGY  VIDEOFLUOROSCOPIC STUDY OF SWALLOWING (MBS)   and PLAN OF CARE    PATIENT NAME:  Jeb Hassan  (female)     MRN:  [id number]    :  1945  (69 y.o.)  STATUS:  Inpatient: Room 8503/8503-A    TODAY'S DATE:  2023    SLP video swallow  Start:  23,   End:  23,   ONE TIME,   Standing Count:  1 Occurrences,   R         Luis Blanton MD   REASON FOR REFERRAL: dysphagia   EVALUATING THERAPIST: MAHIN Berrios      RESULTS:      DYSPHAGIA DIAGNOSIS:  moderate oral phase dysphagia secondary to edentulous state and cognition    -- prolonged mastication with reduced bolus formation and fleeting attention noted during oral phase of exam accompanied by slow lingual motion; SLP recommend facility SLP continue to work to advance to soft solids as warranted     DIET RECOMMENDATIONS:  Pureed consistency solids (IDDSI level 4) with  thin liquids (IDDSI level 0)    FEEDING RECOMMENDATIONS:    Assistance level:  Encourage self-feeding     Compensatory strategies recommended: Upright in bed/ chair as tolerated and Small bites/sips     Discussed recommendations with nursing and/or faxed report to referring provider: Yes    Laryngeal Penetration and Aspiration:  Penetration WITHOUT aspiration was observed in today's study with  thin liquid    SPEECH THERAPY  PLAN OF CARE   The dysphagia POC is established based on physician order and dysphagia diagnosis    Skilled SLP intervention for dysphagia management on acute care up to 5 x per week until goals met, pt plateaus in function and/or discharged from hospital      Conditions Requiring Skilled Therapeutic Intervention for dysphagia:    posterior escape of greater than half of the bolus.    impaired mastication     SPECIFIC DYSPHAGIA INTERVENTIONS TO INCLUDE:     ongoing mealtime assessment to provide diet modification and compensatory strategy implementation to minimize risk of aspiration associated with PO intake  PO trials of upgraded diet textures with SLP only to determine the least restrictive PO diet     Specific instructions for next treatment:  therapeutic po trial to determine safety of advanced diet textures and consistencies  Treatment Goals:    Short Term Goals:  Pt will implement identified compensatory swallowing strategies on 90% of opportunities or greater to improve airway protection and swallow function. During trials of solids patient will masticate solids fully and recollect bolus leaving only minimal oral residue post swallow on  90% of opportunities or greater    Long Term Goals:   Pt will improve oropharyngeal swallow function to ensure airway protection during PO intake to maintain adequate nutrition/hydration and decrease signs/symptoms of aspiration to less than 1 x/day. Patient/family Goal:    Did not state. Will further assess during treatment. Plan of care discussed with Patient   The Patient understand(s) the diagnosis, prognosis and plan of care     Rehabilitation Potential/Prognosis: good                      ADMITTING DIAGNOSIS: TIA (transient ischemic attack) [G45.9]  Acute cystitis with hematuria [N30.01]  Stroke-like symptoms [R29.90]     VISIT DIAGNOSIS:         PATIENT REPORT/COMPLAINT: denies difficulty swallowing; reports difficulty chewing secondary to no dentures     PRIOR LEVEL OF SWALLOW FUNCTION:    Past History of Oropharyngeal Dysphagia?:  none reported    Home diet: Easy to chew consistency solids (IDDSI level 7, transitional) with  thin liquids (IDDSI level 0)  Current Diet Order:  ADULT DIET; Dysphagia - Pureed;  No Drinking Straws    PROCEDURE:  Consistencies Administered During the Evaluation   Liquids: thin liquid and nectar thick liquid   Solids:  pureed foods and soft solid foods      Method of Intake:   cup, straw, spoon  Self fed      Position:   Sitting in the [facility] chair with head elevated above 75 degrees    INSTRUMENTAL ASSESSMENT:    ORAL PREP/ ORAL

## 2023-12-19 VITALS
HEART RATE: 54 BPM | WEIGHT: 223 LBS | OXYGEN SATURATION: 99 % | BODY MASS INDEX: 38.07 KG/M2 | RESPIRATION RATE: 18 BRPM | HEIGHT: 64 IN | DIASTOLIC BLOOD PRESSURE: 54 MMHG | SYSTOLIC BLOOD PRESSURE: 122 MMHG | TEMPERATURE: 98.2 F

## 2023-12-19 PROCEDURE — 97530 THERAPEUTIC ACTIVITIES: CPT

## 2023-12-19 PROCEDURE — 97535 SELF CARE MNGMENT TRAINING: CPT

## 2023-12-19 PROCEDURE — 94660 CPAP INITIATION&MGMT: CPT

## 2023-12-19 PROCEDURE — 6370000000 HC RX 637 (ALT 250 FOR IP): Performed by: STUDENT IN AN ORGANIZED HEALTH CARE EDUCATION/TRAINING PROGRAM

## 2023-12-19 PROCEDURE — 99238 HOSP IP/OBS DSCHRG MGMT 30/<: CPT | Performed by: INTERNAL MEDICINE

## 2023-12-19 PROCEDURE — 2580000003 HC RX 258: Performed by: INTERNAL MEDICINE

## 2023-12-19 PROCEDURE — 94640 AIRWAY INHALATION TREATMENT: CPT

## 2023-12-19 PROCEDURE — 6360000002 HC RX W HCPCS: Performed by: INTERNAL MEDICINE

## 2023-12-19 PROCEDURE — 6370000000 HC RX 637 (ALT 250 FOR IP): Performed by: NURSE PRACTITIONER

## 2023-12-19 PROCEDURE — 6370000000 HC RX 637 (ALT 250 FOR IP): Performed by: INTERNAL MEDICINE

## 2023-12-19 PROCEDURE — 2700000000 HC OXYGEN THERAPY PER DAY

## 2023-12-19 RX ORDER — AMOXICILLIN AND CLAVULANATE POTASSIUM 875; 125 MG/1; MG/1
1 TABLET, FILM COATED ORAL EVERY 12 HOURS SCHEDULED
Qty: 1 TABLET | Refills: 0
Start: 2023-12-19 | End: 2023-12-20

## 2023-12-19 RX ORDER — BUDESONIDE 0.5 MG/2ML
1000 INHALANT ORAL
Qty: 60 EACH | Refills: 3
Start: 2023-12-19

## 2023-12-19 RX ORDER — IPRATROPIUM BROMIDE AND ALBUTEROL SULFATE 2.5; .5 MG/3ML; MG/3ML
3 SOLUTION RESPIRATORY (INHALATION) EVERY 4 HOURS PRN
Qty: 360 ML | Refills: 0
Start: 2023-12-19

## 2023-12-19 RX ADMIN — LISINOPRIL 30 MG: 10 TABLET ORAL at 09:11

## 2023-12-19 RX ADMIN — CLOPIDOGREL BISULFATE 75 MG: 75 TABLET ORAL at 09:11

## 2023-12-19 RX ADMIN — METOPROLOL TARTRATE 25 MG: 25 TABLET, FILM COATED ORAL at 09:10

## 2023-12-19 RX ADMIN — ACETAMINOPHEN 650 MG: 325 TABLET ORAL at 09:15

## 2023-12-19 RX ADMIN — PAROXETINE 30 MG: 10 TABLET, FILM COATED ORAL at 09:11

## 2023-12-19 RX ADMIN — ENOXAPARIN SODIUM 30 MG: 100 INJECTION SUBCUTANEOUS at 09:10

## 2023-12-19 RX ADMIN — LOPERAMIDE HYDROCHLORIDE 2 MG: 2 CAPSULE ORAL at 09:14

## 2023-12-19 RX ADMIN — FUROSEMIDE 20 MG: 40 TABLET ORAL at 09:11

## 2023-12-19 RX ADMIN — EZETIMIBE 10 MG: 10 TABLET ORAL at 09:11

## 2023-12-19 RX ADMIN — Medication 500 MG: at 09:10

## 2023-12-19 RX ADMIN — AMOXICILLIN AND CLAVULANATE POTASSIUM 1 TABLET: 875; 125 TABLET, FILM COATED ORAL at 09:10

## 2023-12-19 RX ADMIN — CLONAZEPAM 1 MG: 0.5 TABLET ORAL at 09:11

## 2023-12-19 RX ADMIN — ASPIRIN 81 MG CHEWABLE TABLET 81 MG: 81 TABLET CHEWABLE at 09:11

## 2023-12-19 RX ADMIN — Medication 10 ML: at 09:12

## 2023-12-19 RX ADMIN — BUDESONIDE INHALATION 1000 MCG: 0.5 SUSPENSION RESPIRATORY (INHALATION) at 09:59

## 2023-12-19 NOTE — CARE COORDINATION
SOCIAL WORK/CASEMANAGEMENT TRANSITION OF CARE PLANNING( [address], [address] [facility]): met with pt this a.m. and went over plan to go to [facility] and that we are still waiting for the precert. Cardio is following and adjusting medications. Pt is on 4l nc and iv fluids. Lucille manriquez needs the bipap order here discontinued if not using it and coming on it before pt comes to them. All discharge paper work with pasrr in place. DORINA Kinney  [id number]/4718    Precert obtained to go to [facility] today. Pas ambulance setup for 5 p.m. sent message to dr. Reynold Juarez to see if he is ok with discharge. The pcp here already put in discharge orders, etc. Called daughter, miguelina, and notified her discharge and she will be up her before pt leaves. Snf;loc.  DORINA Kinney  12/19/2023

## 2023-12-19 NOTE — PLAN OF CARE
Problem: ABCDS Injury Assessment  Goal: Absence of physical injury  Outcome: Completed     Problem: Safety - Adult  Goal: Free from fall injury  Outcome: Completed     Problem: Chronic Conditions and Co-morbidities  Goal: Patient's chronic conditions and co-morbidity symptoms are monitored and maintained or improved  Outcome: Completed     Problem: Discharge Planning  Goal: Discharge to home or other facility with appropriate resources  Outcome: Completed     Problem: Skin/Tissue Integrity  Goal: Absence of new skin breakdown  Description: 1. Monitor for areas of redness and/or skin breakdown  2. Assess vascular access sites hourly  3. Every 4-6 hours minimum:  Change oxygen saturation probe site  4. Every 4-6 hours:  If on nasal continuous positive airway pressure, respiratory therapy assess nares and determine need for appliance change or resting period.   Outcome: Completed     Problem: Pain  Goal: Verbalizes/displays adequate comfort level or baseline comfort level  Outcome: Completed     Problem: Neurosensory - Adult  Goal: Achieves stable or improved neurological status  Outcome: Completed  Goal: Remains free of injury related to seizures activity  Outcome: Completed     Problem: Respiratory - Adult  Goal: Achieves optimal ventilation and oxygenation  Outcome: Completed

## 2023-12-19 NOTE — PROGRESS NOTES
OCCUPATIONAL THERAPY TREATMENT NOTE   COLLEEN Chacontino [address] [place] [address]   [address] , [place], [place]       JZVZ:                                                               Patient Name: Joellen Hogan  MRN: [id number]  : 1945  Room: [address] [place], [place] [address]A    Evaluating [address] [place] [address]     Referring Provider: Daylin Bonilla MD   Specific Provider Orders/Date: OT eval and treat 23      Diagnosis: TIA (transient ischemic attack) [G45.9]  Acute cystitis with hematuria [N30.01]  Stroke-like symptoms [R29.90]   Pt admitted to hospital on 12/10/23 for ataxia and dizziness     Pertinent Medical History:  has a past medical history of Anxiety and depression, Arthritis, Asthma, Borderline diabetes, CAD (coronary artery disease), CHF (congestive heart failure) ([address]), Chronic back pain, COPD (chronic obstructive pulmonary disease) ([address]), Diverticulosis, GERD (gastroesophageal reflux disease), Hyperlipidemia, Hypertension, Left rotator cuff tear, Pneumonia, PONV (postoperative nausea and vomiting), Prolonged emergence from general anesthesia, and TIA (transient ischemic attack).          Precautions:  Fall Risk, cognition, O2, monitor HR,  bed alarm    patient had RRT patient was found to have acute hypoxic respiratory failure, right-sided pleural effusion, left-sided opacification suspected aspiration pneumonia started on Zosyn, BiPAP and transferred to unit neuro ICU    Assessment of current deficits    [x] Functional mobility         [x]ADLs           [x] Strength                  [x]Cognition    [x] Functional transfers       [x] IADLs         [x] Safety Awareness   [x]Endurance    [] Fine Coordination                      [x] Balance      [] Vision/perception   []Sensation      []Gross Motor Coordination          [] ROM           [] Delirium                   [x] Motor Control      OT PLAN OF CARE   OT POC based on physician orders, patient diagnosis

## 2023-12-19 NOTE — PROGRESS NOTES
gradient is 17 mmHg. AV area by continuity VTI is 1.2 cm2. Mitral Valve: Mildly calcified leaflet. Calcified nodular density at chordal anterior leaflet interface. Interatrial Septum: Agitated saline study was negative with and without provocation. Pericardium: Small (<1 cm) pericardial effusion present. No indication of cardiac tamponade. Image quality is adequate. Technically difficult study due to patient's body habitus. IMPRESSIONS:  Patient Active Problem List   Diagnosis Code    Aortic stenosis I35.0    COPD (chronic obstructive pulmonary disease) ([facility]) J44.9    Hypertension I10    H/O hyperlipidemia Z86.39    Coronary artery disease involving native coronary artery of native heart without angina pectoris I25.10    Acute CVA (cerebrovascular accident) ([address]) I63.9    Cerebral aneurysm I67.1    Intracranial atherosclerosis I67.2    Stroke-like symptoms R29.90    TIA (transient ischemic attack) G45.9    Acute cystitis with hematuria N30.01    NINA (acute kidney injury) ([address]) N17.9    Acute cystitis without hematuria N30.00       RECOMMENDATIONS:  Unfortunately Mrs. Hutson's swallow study remains abnormal.once again she is anxious to be discharged however. Continue to carefully monitor electrolytes and adjust medications accordingly. I have spent more than 24 minutes face to face with Girish Forward and reviewing notes and laboratory data, with greater than 50% of this time instructing and counseling the patient  face to face regarding my findings and recommendations and I have answered all questions as posed to me by Ms. Hutson. Loretta Krishnan, DO FACP,FACC,FSCAI      NOTE:  This report was transcribed using voice recognition software.   Every effort was made to ensure accuracy; however, inadvertent computerized transcription errors may be present

## 2023-12-19 NOTE — PROGRESS NOTES
This RN was told by staff that visiting daughter is feeding patient solid food (grilled cheese). Educated patient and patient's family extensively about pureed food and SLP recommendations. Educated on the risk of aspiration. Patient continues to show no evidence of learning and states she hates the texture of her diet. Daughter verbalized an understanding.

## 2023-12-20 LAB
EKG ATRIAL RATE: 118 BPM
EKG ATRIAL RATE: 75 BPM
EKG P AXIS: 44 DEGREES
EKG P AXIS: 67 DEGREES
EKG P-R INTERVAL: 138 MS
EKG P-R INTERVAL: 150 MS
EKG Q-T INTERVAL: 352 MS
EKG Q-T INTERVAL: 426 MS
EKG QRS DURATION: 76 MS
EKG QRS DURATION: 78 MS
EKG QTC CALCULATION (BAZETT): 475 MS
EKG QTC CALCULATION (BAZETT): 493 MS
EKG R AXIS: 26 DEGREES
EKG R AXIS: 28 DEGREES
EKG T AXIS: 40 DEGREES
EKG T AXIS: 51 DEGREES
EKG VENTRICULAR RATE: 118 BPM
EKG VENTRICULAR RATE: 75 BPM

## 2023-12-22 NOTE — PROGRESS NOTES
Physician Progress Note      Estefani Mayer  CSN #:                  [id number]  :                       1945  ADMIT DATE:       12/10/2023 4:39 PM  [address] DATE:        2023 6:16 PM  RESPONDING  PROVIDER #:        Monse Bang MD          QUERY TEXT:    Patient admitted with UTI with fatigue and generalized weakness. Documentation   reflects Sepsis in H&P and Consult note(s) dated 12/10 and . If   possible, please document in the progress notes and discharge summary if   Sepsis was: The medical record reflects the following:  Risk Factors: Advanced age Admitted with UTI  Clinical Indicators: H&P states: \". Lance Simms Sepsis due to UTI- follow cultures. Lance Simms \",   General surgery Consult note states: \". Lance Simms I provided critical care to a patient   with sepsis, acute hypoxic respiratory failure requiring frequent and   emergent imaging, lab studies, intensive monitoring and data review as well as   urgent coordination with multiple specialists. Lance Simms \", LABS:   124 WBC   15.9  14.0  9.7  8.0  7.0 Procal 0.26 SED rate 77  91, Noted NINA on admission  Treatment:  Zosyn, Augmentin, Serial labs. close pt monitoring    Thank you,  Akira YOON, RN, CRCR  Clinical Documentation Improvement  [email or web address]  Options provided:  -- Sepsis confirmed after study  -- Sepsis ruled out after study  -- Other - I will add my own diagnosis  -- Disagree - Not applicable / Not valid  -- Disagree - Clinically unable to determine / Unknown  -- Refer to Clinical Documentation Reviewer    PROVIDER RESPONSE TEXT:    Sepsis confirmed after study.     Query created by: Marko Monaco on 2023 2:30 PM      Electronically signed by:  Monse Bang MD 2023 10:36 AM

## 2025-01-16 ENCOUNTER — APPOINTMENT (OUTPATIENT)
Dept: CT IMAGING | Age: 80
DRG: 113 | End: 2025-01-16
Payer: MEDICAID

## 2025-01-16 ENCOUNTER — APPOINTMENT (OUTPATIENT)
Dept: GENERAL RADIOLOGY | Age: 80
DRG: 113 | End: 2025-01-16
Payer: MEDICAID

## 2025-01-16 ENCOUNTER — HOSPITAL ENCOUNTER (INPATIENT)
Age: 80
LOS: 4 days | Discharge: HOME | DRG: 113 | End: 2025-01-20
Attending: EMERGENCY MEDICINE | Admitting: INTERNAL MEDICINE
Payer: MEDICAID

## 2025-01-16 DIAGNOSIS — G89.29 CHRONIC LOW BACK PAIN, UNSPECIFIED BACK PAIN LATERALITY, UNSPECIFIED WHETHER SCIATICA PRESENT: ICD-10-CM

## 2025-01-16 DIAGNOSIS — R42 DIZZINESS: Primary | ICD-10-CM

## 2025-01-16 DIAGNOSIS — R05.1 ACUTE COUGH: ICD-10-CM

## 2025-01-16 DIAGNOSIS — E86.0 DEHYDRATION: ICD-10-CM

## 2025-01-16 DIAGNOSIS — M54.50 CHRONIC LOW BACK PAIN, UNSPECIFIED BACK PAIN LATERALITY, UNSPECIFIED WHETHER SCIATICA PRESENT: ICD-10-CM

## 2025-01-16 DIAGNOSIS — F41.9 ANXIETY: ICD-10-CM

## 2025-01-16 PROBLEM — R62.7 FAILURE TO THRIVE IN ADULT: Status: ACTIVE | Noted: 2025-01-16

## 2025-01-16 LAB
ALBUMIN SERPL-MCNC: 3.9 G/DL (ref 3.5–5.2)
ALP SERPL-CCNC: 107 U/L (ref 35–104)
ALT SERPL-CCNC: 9 U/L (ref 0–32)
ANION GAP SERPL CALCULATED.3IONS-SCNC: 8 MMOL/L (ref 7–16)
AST SERPL-CCNC: 13 U/L (ref 0–31)
BASOPHILS # BLD: 0.11 K/UL (ref 0–0.2)
BASOPHILS NFR BLD: 1 % (ref 0–2)
BILIRUB SERPL-MCNC: 0.3 MG/DL (ref 0–1.2)
BILIRUB UR QL STRIP: NEGATIVE
BNP SERPL-MCNC: 130 PG/ML (ref 0–450)
BUN SERPL-MCNC: 22 MG/DL (ref 6–23)
CALCIUM SERPL-MCNC: 9.2 MG/DL (ref 8.6–10.2)
CHLORIDE SERPL-SCNC: 103 MMOL/L (ref 98–107)
CLARITY UR: CLEAR
CO2 SERPL-SCNC: 29 MMOL/L (ref 22–29)
COLOR UR: YELLOW
CREAT SERPL-MCNC: 1.1 MG/DL (ref 0.5–1)
EOSINOPHIL # BLD: 0.22 K/UL (ref 0.05–0.5)
EOSINOPHILS RELATIVE PERCENT: 2 % (ref 0–6)
ERYTHROCYTE [DISTWIDTH] IN BLOOD BY AUTOMATED COUNT: 13 % (ref 11.5–15)
GFR, ESTIMATED: 52 ML/MIN/1.73M2
GLUCOSE SERPL-MCNC: 98 MG/DL (ref 74–99)
GLUCOSE UR STRIP-MCNC: NEGATIVE MG/DL
HCT VFR BLD AUTO: 41.3 % (ref 34–48)
HGB BLD-MCNC: 12.4 G/DL (ref 11.5–15.5)
HGB UR QL STRIP.AUTO: NEGATIVE
IMM GRANULOCYTES # BLD AUTO: 0.03 K/UL (ref 0–0.58)
IMM GRANULOCYTES NFR BLD: 0 % (ref 0–5)
INFLUENZA A BY PCR: NOT DETECTED
INFLUENZA B BY PCR: NOT DETECTED
KETONES UR STRIP-MCNC: NEGATIVE MG/DL
LEUKOCYTE ESTERASE UR QL STRIP: ABNORMAL
LYMPHOCYTES NFR BLD: 2.02 K/UL (ref 1.5–4)
LYMPHOCYTES RELATIVE PERCENT: 22 % (ref 20–42)
MCH RBC QN AUTO: 29.6 PG (ref 26–35)
MCHC RBC AUTO-ENTMCNC: 30 G/DL (ref 32–34.5)
MCV RBC AUTO: 98.6 FL (ref 80–99.9)
MONOCYTES NFR BLD: 0.68 K/UL (ref 0.1–0.95)
MONOCYTES NFR BLD: 8 % (ref 2–12)
NEUTROPHILS NFR BLD: 66 % (ref 43–80)
NEUTS SEG NFR BLD: 6.02 K/UL (ref 1.8–7.3)
NITRITE UR QL STRIP: NEGATIVE
PH UR STRIP: 5.5 [PH] (ref 5–9)
PLATELET # BLD AUTO: 341 K/UL (ref 130–450)
PMV BLD AUTO: 9.9 FL (ref 7–12)
POTASSIUM SERPL-SCNC: 4.4 MMOL/L (ref 3.5–5)
PROT SERPL-MCNC: 6.6 G/DL (ref 6.4–8.3)
PROT UR STRIP-MCNC: NEGATIVE MG/DL
RBC # BLD AUTO: 4.19 M/UL (ref 3.5–5.5)
RBC #/AREA URNS HPF: ABNORMAL /HPF
SARS-COV-2 RDRP RESP QL NAA+PROBE: NOT DETECTED
SODIUM SERPL-SCNC: 140 MMOL/L (ref 132–146)
SP GR UR STRIP: 1.01 (ref 1–1.03)
SPECIMEN DESCRIPTION: NORMAL
TROPONIN I SERPL HS-MCNC: 13 NG/L (ref 0–9)
UROBILINOGEN UR STRIP-ACNC: 0.2 EU/DL (ref 0–1)
WBC #/AREA URNS HPF: ABNORMAL /HPF
WBC OTHER # BLD: 9.1 K/UL (ref 4.5–11.5)

## 2025-01-16 PROCEDURE — 83880 ASSAY OF NATRIURETIC PEPTIDE: CPT

## 2025-01-16 PROCEDURE — 84484 ASSAY OF TROPONIN QUANT: CPT

## 2025-01-16 PROCEDURE — 87502 INFLUENZA DNA AMP PROBE: CPT

## 2025-01-16 PROCEDURE — 71045 X-RAY EXAM CHEST 1 VIEW: CPT

## 2025-01-16 PROCEDURE — 96375 TX/PRO/DX INJ NEW DRUG ADDON: CPT

## 2025-01-16 PROCEDURE — 94664 DEMO&/EVAL PT USE INHALER: CPT

## 2025-01-16 PROCEDURE — 6370000000 HC RX 637 (ALT 250 FOR IP): Performed by: EMERGENCY MEDICINE

## 2025-01-16 PROCEDURE — 2500000003 HC RX 250 WO HCPCS: Performed by: NURSE PRACTITIONER

## 2025-01-16 PROCEDURE — 87086 URINE CULTURE/COLONY COUNT: CPT

## 2025-01-16 PROCEDURE — 85025 COMPLETE CBC W/AUTO DIFF WBC: CPT

## 2025-01-16 PROCEDURE — 2580000003 HC RX 258: Performed by: EMERGENCY MEDICINE

## 2025-01-16 PROCEDURE — 87635 SARS-COV-2 COVID-19 AMP PRB: CPT

## 2025-01-16 PROCEDURE — 1200000000 HC SEMI PRIVATE

## 2025-01-16 PROCEDURE — 96361 HYDRATE IV INFUSION ADD-ON: CPT

## 2025-01-16 PROCEDURE — 6360000002 HC RX W HCPCS: Performed by: EMERGENCY MEDICINE

## 2025-01-16 PROCEDURE — 6370000000 HC RX 637 (ALT 250 FOR IP): Performed by: NURSE PRACTITIONER

## 2025-01-16 PROCEDURE — 94640 AIRWAY INHALATION TREATMENT: CPT

## 2025-01-16 PROCEDURE — 99285 EMERGENCY DEPT VISIT HI MDM: CPT

## 2025-01-16 PROCEDURE — 70450 CT HEAD/BRAIN W/O DYE: CPT

## 2025-01-16 PROCEDURE — 81001 URINALYSIS AUTO W/SCOPE: CPT

## 2025-01-16 PROCEDURE — 96374 THER/PROPH/DIAG INJ IV PUSH: CPT

## 2025-01-16 PROCEDURE — 80053 COMPREHEN METABOLIC PANEL: CPT

## 2025-01-16 RX ORDER — CLONAZEPAM 0.5 MG/1
1 TABLET ORAL 2 TIMES DAILY
Status: DISCONTINUED | OUTPATIENT
Start: 2025-01-17 | End: 2025-01-20

## 2025-01-16 RX ORDER — IPRATROPIUM BROMIDE AND ALBUTEROL SULFATE 2.5; .5 MG/3ML; MG/3ML
1 SOLUTION RESPIRATORY (INHALATION) EVERY 4 HOURS PRN
Status: DISCONTINUED | OUTPATIENT
Start: 2025-01-16 | End: 2025-01-20 | Stop reason: HOSPADM

## 2025-01-16 RX ORDER — GABAPENTIN 300 MG/1
300 CAPSULE ORAL 3 TIMES DAILY
Status: ON HOLD | COMMUNITY
End: 2025-01-17

## 2025-01-16 RX ORDER — FUROSEMIDE 20 MG/1
20 TABLET ORAL DAILY
Status: DISCONTINUED | OUTPATIENT
Start: 2025-01-17 | End: 2025-01-20 | Stop reason: HOSPADM

## 2025-01-16 RX ORDER — ASPIRIN 81 MG/1
81 TABLET ORAL DAILY
Status: DISCONTINUED | OUTPATIENT
Start: 2025-01-17 | End: 2025-01-20 | Stop reason: HOSPADM

## 2025-01-16 RX ORDER — SODIUM CHLORIDE 9 MG/ML
INJECTION, SOLUTION INTRAVENOUS PRN
Status: DISCONTINUED | OUTPATIENT
Start: 2025-01-16 | End: 2025-01-20 | Stop reason: HOSPADM

## 2025-01-16 RX ORDER — METHYLPREDNISOLONE SODIUM SUCCINATE 125 MG/2ML
60 INJECTION INTRAMUSCULAR; INTRAVENOUS ONCE
Status: COMPLETED | OUTPATIENT
Start: 2025-01-16 | End: 2025-01-16

## 2025-01-16 RX ORDER — ONDANSETRON 2 MG/ML
4 INJECTION INTRAMUSCULAR; INTRAVENOUS EVERY 6 HOURS PRN
Status: DISCONTINUED | OUTPATIENT
Start: 2025-01-16 | End: 2025-01-20 | Stop reason: HOSPADM

## 2025-01-16 RX ORDER — SENNOSIDES A AND B 8.6 MG/1
1 TABLET, FILM COATED ORAL DAILY PRN
Status: DISCONTINUED | OUTPATIENT
Start: 2025-01-16 | End: 2025-01-20 | Stop reason: HOSPADM

## 2025-01-16 RX ORDER — ONDANSETRON 2 MG/ML
4 INJECTION INTRAMUSCULAR; INTRAVENOUS ONCE
Status: COMPLETED | OUTPATIENT
Start: 2025-01-16 | End: 2025-01-16

## 2025-01-16 RX ORDER — METOPROLOL TARTRATE 25 MG/1
25 TABLET, FILM COATED ORAL 2 TIMES DAILY
Status: DISCONTINUED | OUTPATIENT
Start: 2025-01-16 | End: 2025-01-20 | Stop reason: HOSPADM

## 2025-01-16 RX ORDER — SODIUM CHLORIDE 0.9 % (FLUSH) 0.9 %
10 SYRINGE (ML) INJECTION EVERY 12 HOURS SCHEDULED
Status: DISCONTINUED | OUTPATIENT
Start: 2025-01-16 | End: 2025-01-20 | Stop reason: HOSPADM

## 2025-01-16 RX ORDER — MIRTAZAPINE 15 MG/1
15 TABLET, FILM COATED ORAL NIGHTLY
Status: DISCONTINUED | OUTPATIENT
Start: 2025-01-16 | End: 2025-01-20 | Stop reason: HOSPADM

## 2025-01-16 RX ORDER — PANTOPRAZOLE SODIUM 40 MG/1
40 TABLET, DELAYED RELEASE ORAL
Status: DISCONTINUED | OUTPATIENT
Start: 2025-01-17 | End: 2025-01-20 | Stop reason: HOSPADM

## 2025-01-16 RX ORDER — ALBUTEROL SULFATE 0.83 MG/ML
2.5 SOLUTION RESPIRATORY (INHALATION) EVERY 4 HOURS PRN
Status: DISCONTINUED | OUTPATIENT
Start: 2025-01-16 | End: 2025-01-20 | Stop reason: HOSPADM

## 2025-01-16 RX ORDER — ACETAMINOPHEN 650 MG/1
650 SUPPOSITORY RECTAL EVERY 6 HOURS PRN
Status: DISCONTINUED | OUTPATIENT
Start: 2025-01-16 | End: 2025-01-20 | Stop reason: HOSPADM

## 2025-01-16 RX ORDER — LISINOPRIL 20 MG/1
20 TABLET ORAL DAILY
Status: DISCONTINUED | OUTPATIENT
Start: 2025-01-17 | End: 2025-01-20 | Stop reason: HOSPADM

## 2025-01-16 RX ORDER — POTASSIUM CHLORIDE 7.45 MG/ML
10 INJECTION INTRAVENOUS PRN
Status: DISCONTINUED | OUTPATIENT
Start: 2025-01-16 | End: 2025-01-20 | Stop reason: HOSPADM

## 2025-01-16 RX ORDER — HYDROCODONE BITARTRATE AND ACETAMINOPHEN 5; 325 MG/1; MG/1
1 TABLET ORAL EVERY 8 HOURS PRN
Status: DISCONTINUED | OUTPATIENT
Start: 2025-01-16 | End: 2025-01-20 | Stop reason: HOSPADM

## 2025-01-16 RX ORDER — SODIUM CHLORIDE 0.9 % (FLUSH) 0.9 %
10 SYRINGE (ML) INJECTION PRN
Status: DISCONTINUED | OUTPATIENT
Start: 2025-01-16 | End: 2025-01-20 | Stop reason: HOSPADM

## 2025-01-16 RX ORDER — ENOXAPARIN SODIUM 100 MG/ML
40 INJECTION SUBCUTANEOUS DAILY
Status: DISCONTINUED | OUTPATIENT
Start: 2025-01-17 | End: 2025-01-20 | Stop reason: HOSPADM

## 2025-01-16 RX ORDER — POTASSIUM CHLORIDE 1500 MG/1
40 TABLET, EXTENDED RELEASE ORAL PRN
Status: DISCONTINUED | OUTPATIENT
Start: 2025-01-16 | End: 2025-01-20 | Stop reason: HOSPADM

## 2025-01-16 RX ORDER — EZETIMIBE 10 MG/1
10 TABLET ORAL DAILY
Status: DISCONTINUED | OUTPATIENT
Start: 2025-01-17 | End: 2025-01-20 | Stop reason: HOSPADM

## 2025-01-16 RX ORDER — DOXEPIN HYDROCHLORIDE 50 MG/1
100 CAPSULE ORAL NIGHTLY
Status: DISCONTINUED | OUTPATIENT
Start: 2025-01-16 | End: 2025-01-20 | Stop reason: HOSPADM

## 2025-01-16 RX ORDER — ATORVASTATIN CALCIUM 40 MG/1
40 TABLET, FILM COATED ORAL DAILY
Status: DISCONTINUED | OUTPATIENT
Start: 2025-01-17 | End: 2025-01-20 | Stop reason: HOSPADM

## 2025-01-16 RX ORDER — MAGNESIUM SULFATE IN WATER 40 MG/ML
2000 INJECTION, SOLUTION INTRAVENOUS PRN
Status: DISCONTINUED | OUTPATIENT
Start: 2025-01-16 | End: 2025-01-20 | Stop reason: HOSPADM

## 2025-01-16 RX ORDER — IPRATROPIUM BROMIDE AND ALBUTEROL SULFATE 2.5; .5 MG/3ML; MG/3ML
1 SOLUTION RESPIRATORY (INHALATION)
Status: COMPLETED | OUTPATIENT
Start: 2025-01-16 | End: 2025-01-16

## 2025-01-16 RX ORDER — ONDANSETRON 4 MG/1
4 TABLET, ORALLY DISINTEGRATING ORAL EVERY 8 HOURS PRN
Status: DISCONTINUED | OUTPATIENT
Start: 2025-01-16 | End: 2025-01-20 | Stop reason: HOSPADM

## 2025-01-16 RX ORDER — 0.9 % SODIUM CHLORIDE 0.9 %
1000 INTRAVENOUS SOLUTION INTRAVENOUS ONCE
Status: COMPLETED | OUTPATIENT
Start: 2025-01-16 | End: 2025-01-16

## 2025-01-16 RX ORDER — CLOPIDOGREL BISULFATE 75 MG/1
75 TABLET ORAL DAILY
Status: DISCONTINUED | OUTPATIENT
Start: 2025-01-17 | End: 2025-01-20 | Stop reason: HOSPADM

## 2025-01-16 RX ORDER — ACETAMINOPHEN 325 MG/1
650 TABLET ORAL EVERY 6 HOURS PRN
Status: DISCONTINUED | OUTPATIENT
Start: 2025-01-16 | End: 2025-01-20 | Stop reason: HOSPADM

## 2025-01-16 RX ORDER — BUDESONIDE 0.5 MG/2ML
1000 INHALANT ORAL
Status: DISCONTINUED | OUTPATIENT
Start: 2025-01-16 | End: 2025-01-20 | Stop reason: HOSPADM

## 2025-01-16 RX ADMIN — ONDANSETRON 4 MG: 4 TABLET, ORALLY DISINTEGRATING ORAL at 23:52

## 2025-01-16 RX ADMIN — ONDANSETRON 4 MG: 2 INJECTION, SOLUTION INTRAMUSCULAR; INTRAVENOUS at 15:02

## 2025-01-16 RX ADMIN — METHYLPREDNISOLONE SODIUM SUCCINATE 60 MG: 125 INJECTION INTRAMUSCULAR; INTRAVENOUS at 15:03

## 2025-01-16 RX ADMIN — IPRATROPIUM BROMIDE AND ALBUTEROL SULFATE 1 DOSE: .5; 3 SOLUTION RESPIRATORY (INHALATION) at 14:46

## 2025-01-16 RX ADMIN — SODIUM CHLORIDE, PRESERVATIVE FREE 10 ML: 5 INJECTION INTRAVENOUS at 23:52

## 2025-01-16 RX ADMIN — IPRATROPIUM BROMIDE AND ALBUTEROL SULFATE 1 DOSE: .5; 3 SOLUTION RESPIRATORY (INHALATION) at 14:45

## 2025-01-16 RX ADMIN — DOXEPIN HYDROCHLORIDE 100 MG: 50 CAPSULE ORAL at 23:52

## 2025-01-16 RX ADMIN — MIRTAZAPINE 15 MG: 15 TABLET, FILM COATED ORAL at 23:52

## 2025-01-16 RX ADMIN — SODIUM CHLORIDE 1000 ML: 9 INJECTION, SOLUTION INTRAVENOUS at 17:36

## 2025-01-16 RX ADMIN — IPRATROPIUM BROMIDE AND ALBUTEROL SULFATE 1 DOSE: .5; 3 SOLUTION RESPIRATORY (INHALATION) at 14:44

## 2025-01-16 NOTE — ED PROVIDER NOTES
HPI:  1/16/25, Time: 1:58 PM ABIGAIL Hutson is a 79 y.o. female presenting to the ED for productive cough with mucus and shortness of breath beginning a few days ago.  No hemoptysis or syncope.  No chest pain, documented fevers, or abdominal pain.  She has had some nausea.  History of COPD on 3 to 4 L of oxygen at baseline.  No increased oxygen requirements.  Patient was in the office seeing her PCP today.  Apparently they were unable to get blood work and they were concerned for dehydration so they sent her to the ED for further evaluation.  Patient is not anticoagulated.  Denies history of DVT/PE.    Review of Systems:  Pertinent positives and negatives as per HPI.    --------------------------------------------- PAST HISTORY ---------------------------------------------  Past Medical History:  has a past medical history of Anxiety and depression, Arthritis, Asthma, Borderline diabetes, CAD (coronary artery disease), CHF (congestive heart failure) ([facility]), Chronic back pain, COPD (chronic obstructive pulmonary disease) ([facility]), Diverticulosis, GERD (gastroesophageal reflux disease), Hyperlipidemia, Hypertension, Left rotator cuff tear, Pneumonia, PONV (postoperative nausea and vomiting), Prolonged emergence from general anesthesia, and TIA (transient ischemic attack).    Past Surgical History:  has a past surgical history that includes Dental surgery; Hemorrhoid surgery; Appendectomy; Diagnostic Cardiac Cath Lab Procedure (07/26/10); Diagnostic Cardiac Cath Lab Procedure (10/04/10); Diagnostic Cardiac Cath Lab Procedure (11/22/11); Coronary angioplasty (10/04/10); Aortic valve replacement (11/28/11); Chest surgery (11/28/11); Cardiac catheterization (Right, 1-8-2013); ECHO Transesophageal (1/27/2014); Cardiac catheterization (2/10/15); Hysterectomy (1980); Abdomen surgery (1969); eye surgery; Colonoscopy; Endoscopy, colon, diagnostic; brain surgery (2000); and Cardiac valve replacement (6/17/15).    Social

## 2025-01-17 LAB
ALBUMIN SERPL-MCNC: 3.4 G/DL (ref 3.5–5.2)
ALP SERPL-CCNC: 88 U/L (ref 35–104)
ALT SERPL-CCNC: 7 U/L (ref 0–32)
ANION GAP SERPL CALCULATED.3IONS-SCNC: 10 MMOL/L (ref 7–16)
AST SERPL-CCNC: 10 U/L (ref 0–31)
B PARAP IS1001 DNA NPH QL NAA+NON-PROBE: NOT DETECTED
B PERT DNA SPEC QL NAA+PROBE: NOT DETECTED
BASOPHILS # BLD: 0.01 K/UL (ref 0–0.2)
BASOPHILS NFR BLD: 0 % (ref 0–2)
BILIRUB SERPL-MCNC: 0.3 MG/DL (ref 0–1.2)
BUN SERPL-MCNC: 21 MG/DL (ref 6–23)
C PNEUM DNA NPH QL NAA+NON-PROBE: NOT DETECTED
CALCIUM SERPL-MCNC: 8.9 MG/DL (ref 8.6–10.2)
CHLORIDE SERPL-SCNC: 105 MMOL/L (ref 98–107)
CO2 SERPL-SCNC: 25 MMOL/L (ref 22–29)
CREAT SERPL-MCNC: 1 MG/DL (ref 0.5–1)
EOSINOPHIL # BLD: 0 K/UL (ref 0.05–0.5)
EOSINOPHILS RELATIVE PERCENT: 0 % (ref 0–6)
ERYTHROCYTE [DISTWIDTH] IN BLOOD BY AUTOMATED COUNT: 13 % (ref 11.5–15)
FLUAV RNA NPH QL NAA+NON-PROBE: NOT DETECTED
FLUBV RNA NPH QL NAA+NON-PROBE: NOT DETECTED
GFR, ESTIMATED: 58 ML/MIN/1.73M2
GLUCOSE SERPL-MCNC: 176 MG/DL (ref 74–99)
HADV DNA NPH QL NAA+NON-PROBE: NOT DETECTED
HCOV 229E RNA NPH QL NAA+NON-PROBE: NOT DETECTED
HCOV HKU1 RNA NPH QL NAA+NON-PROBE: NOT DETECTED
HCOV NL63 RNA NPH QL NAA+NON-PROBE: DETECTED
HCOV OC43 RNA NPH QL NAA+NON-PROBE: NOT DETECTED
HCT VFR BLD AUTO: 34.8 % (ref 34–48)
HGB BLD-MCNC: 11.1 G/DL (ref 11.5–15.5)
HMPV RNA NPH QL NAA+NON-PROBE: NOT DETECTED
HPIV1 RNA NPH QL NAA+NON-PROBE: NOT DETECTED
HPIV2 RNA NPH QL NAA+NON-PROBE: NOT DETECTED
HPIV3 RNA NPH QL NAA+NON-PROBE: NOT DETECTED
HPIV4 RNA NPH QL NAA+NON-PROBE: NOT DETECTED
IMM GRANULOCYTES # BLD AUTO: 0.05 K/UL (ref 0–0.58)
IMM GRANULOCYTES NFR BLD: 1 % (ref 0–5)
LYMPHOCYTES NFR BLD: 0.59 K/UL (ref 1.5–4)
LYMPHOCYTES RELATIVE PERCENT: 8 % (ref 20–42)
M PNEUMO DNA NPH QL NAA+NON-PROBE: NOT DETECTED
MCH RBC QN AUTO: 30.7 PG (ref 26–35)
MCHC RBC AUTO-ENTMCNC: 31.9 G/DL (ref 32–34.5)
MCV RBC AUTO: 96.4 FL (ref 80–99.9)
MICROORGANISM SPEC CULT: ABNORMAL
MONOCYTES NFR BLD: 0.09 K/UL (ref 0.1–0.95)
MONOCYTES NFR BLD: 1 % (ref 2–12)
NEUTROPHILS NFR BLD: 90 % (ref 43–80)
NEUTS SEG NFR BLD: 6.87 K/UL (ref 1.8–7.3)
PLATELET # BLD AUTO: 289 K/UL (ref 130–450)
PMV BLD AUTO: 9.9 FL (ref 7–12)
POTASSIUM SERPL-SCNC: 4.8 MMOL/L (ref 3.5–5)
PROT SERPL-MCNC: 5.8 G/DL (ref 6.4–8.3)
RBC # BLD AUTO: 3.61 M/UL (ref 3.5–5.5)
RBC # BLD: ABNORMAL 10*6/UL
RSV RNA NPH QL NAA+NON-PROBE: NOT DETECTED
RV+EV RNA NPH QL NAA+NON-PROBE: NOT DETECTED
SARS-COV-2 RNA NPH QL NAA+NON-PROBE: NOT DETECTED
SERVICE CMNT-IMP: ABNORMAL
SODIUM SERPL-SCNC: 140 MMOL/L (ref 132–146)
SPECIMEN DESCRIPTION: ABNORMAL
SPECIMEN DESCRIPTION: ABNORMAL
TSH SERPL DL<=0.05 MIU/L-ACNC: 0.59 UIU/ML (ref 0.27–4.2)
WBC OTHER # BLD: 7.6 K/UL (ref 4.5–11.5)

## 2025-01-17 PROCEDURE — 97161 PT EVAL LOW COMPLEX 20 MIN: CPT

## 2025-01-17 PROCEDURE — 6360000002 HC RX W HCPCS: Performed by: NURSE PRACTITIONER

## 2025-01-17 PROCEDURE — 0202U NFCT DS 22 TRGT SARS-COV-2: CPT

## 2025-01-17 PROCEDURE — 80053 COMPREHEN METABOLIC PANEL: CPT

## 2025-01-17 PROCEDURE — 97165 OT EVAL LOW COMPLEX 30 MIN: CPT

## 2025-01-17 PROCEDURE — 94640 AIRWAY INHALATION TREATMENT: CPT

## 2025-01-17 PROCEDURE — 2700000000 HC OXYGEN THERAPY PER DAY

## 2025-01-17 PROCEDURE — 1200000000 HC SEMI PRIVATE

## 2025-01-17 PROCEDURE — 6370000000 HC RX 637 (ALT 250 FOR IP): Performed by: NURSE PRACTITIONER

## 2025-01-17 PROCEDURE — 84443 ASSAY THYROID STIM HORMONE: CPT

## 2025-01-17 PROCEDURE — 51798 US URINE CAPACITY MEASURE: CPT

## 2025-01-17 PROCEDURE — 2580000003 HC RX 258: Performed by: INTERNAL MEDICINE

## 2025-01-17 PROCEDURE — 85025 COMPLETE CBC W/AUTO DIFF WBC: CPT

## 2025-01-17 PROCEDURE — 2500000003 HC RX 250 WO HCPCS: Performed by: NURSE PRACTITIONER

## 2025-01-17 RX ORDER — CLONAZEPAM 1 MG/1
1 TABLET ORAL 2 TIMES DAILY
Qty: 6 TABLET | Refills: 0 | Status: SHIPPED | OUTPATIENT
Start: 2025-01-17 | End: 2025-01-20 | Stop reason: HOSPADM

## 2025-01-17 RX ORDER — GABAPENTIN 300 MG/1
300 CAPSULE ORAL 3 TIMES DAILY
Qty: 9 CAPSULE | Refills: 0 | Status: SHIPPED | OUTPATIENT
Start: 2025-01-17 | End: 2025-01-20 | Stop reason: HOSPADM

## 2025-01-17 RX ORDER — HYDROCODONE BITARTRATE AND ACETAMINOPHEN 5; 325 MG/1; MG/1
1 TABLET ORAL EVERY 8 HOURS PRN
Qty: 9 TABLET | Refills: 0 | Status: SHIPPED | OUTPATIENT
Start: 2025-01-17 | End: 2025-01-20

## 2025-01-17 RX ORDER — DOXEPIN HYDROCHLORIDE 100 MG/1
100 CAPSULE ORAL 2 TIMES DAILY
DISCHARGE
Start: 2025-01-17

## 2025-01-17 RX ORDER — SODIUM CHLORIDE 9 MG/ML
INJECTION, SOLUTION INTRAVENOUS CONTINUOUS
Status: DISCONTINUED | OUTPATIENT
Start: 2025-01-17 | End: 2025-01-20

## 2025-01-17 RX ORDER — GABAPENTIN 300 MG/1
300 CAPSULE ORAL 3 TIMES DAILY
Status: DISCONTINUED | OUTPATIENT
Start: 2025-01-17 | End: 2025-01-20

## 2025-01-17 RX ADMIN — GABAPENTIN 300 MG: 300 CAPSULE ORAL at 08:40

## 2025-01-17 RX ADMIN — PANTOPRAZOLE SODIUM 40 MG: 40 TABLET, DELAYED RELEASE ORAL at 05:34

## 2025-01-17 RX ADMIN — METOPROLOL TARTRATE 25 MG: 25 TABLET, FILM COATED ORAL at 08:40

## 2025-01-17 RX ADMIN — MIRTAZAPINE 15 MG: 15 TABLET, FILM COATED ORAL at 21:35

## 2025-01-17 RX ADMIN — GABAPENTIN 300 MG: 300 CAPSULE ORAL at 21:35

## 2025-01-17 RX ADMIN — BUDESONIDE INHALATION 1000 MCG: 0.5 SUSPENSION RESPIRATORY (INHALATION) at 07:54

## 2025-01-17 RX ADMIN — SODIUM CHLORIDE, PRESERVATIVE FREE 10 ML: 5 INJECTION INTRAVENOUS at 08:42

## 2025-01-17 RX ADMIN — HYDROCODONE BITARTRATE AND ACETAMINOPHEN 1 TABLET: 5; 325 TABLET ORAL at 00:20

## 2025-01-17 RX ADMIN — IPRATROPIUM BROMIDE AND ALBUTEROL SULFATE 1 DOSE: .5; 3 SOLUTION RESPIRATORY (INHALATION) at 07:54

## 2025-01-17 RX ADMIN — PAROXETINE 30 MG: 10 TABLET, FILM COATED ORAL at 08:40

## 2025-01-17 RX ADMIN — CLONAZEPAM 1 MG: 0.5 TABLET ORAL at 16:57

## 2025-01-17 RX ADMIN — SODIUM CHLORIDE: 9 INJECTION, SOLUTION INTRAVENOUS at 06:48

## 2025-01-17 RX ADMIN — LISINOPRIL 20 MG: 20 TABLET ORAL at 08:42

## 2025-01-17 RX ADMIN — ENOXAPARIN SODIUM 40 MG: 100 INJECTION SUBCUTANEOUS at 08:42

## 2025-01-17 RX ADMIN — ASPIRIN 81 MG: 81 TABLET, COATED ORAL at 08:41

## 2025-01-17 RX ADMIN — DOXEPIN HYDROCHLORIDE 100 MG: 50 CAPSULE ORAL at 23:58

## 2025-01-17 RX ADMIN — CLONAZEPAM 1 MG: 0.5 TABLET ORAL at 08:40

## 2025-01-17 RX ADMIN — EZETIMIBE 10 MG: 10 TABLET ORAL at 08:40

## 2025-01-17 RX ADMIN — BUDESONIDE INHALATION 1000 MCG: 0.5 SUSPENSION RESPIRATORY (INHALATION) at 19:41

## 2025-01-17 RX ADMIN — ATORVASTATIN CALCIUM 40 MG: 40 TABLET, FILM COATED ORAL at 08:40

## 2025-01-17 RX ADMIN — GABAPENTIN 300 MG: 300 CAPSULE ORAL at 15:20

## 2025-01-17 RX ADMIN — CLOPIDOGREL BISULFATE 75 MG: 75 TABLET ORAL at 08:40

## 2025-01-17 RX ADMIN — HYDROCODONE BITARTRATE AND ACETAMINOPHEN 1 TABLET: 5; 325 TABLET ORAL at 15:20

## 2025-01-17 ASSESSMENT — PAIN DESCRIPTION - FREQUENCY: FREQUENCY: INTERMITTENT

## 2025-01-17 ASSESSMENT — PAIN DESCRIPTION - ORIENTATION: ORIENTATION: RIGHT;LEFT

## 2025-01-17 ASSESSMENT — PAIN SCALES - GENERAL
PAINLEVEL_OUTOF10: 5
PAINLEVEL_OUTOF10: 7
PAINLEVEL_OUTOF10: 6
PAINLEVEL_OUTOF10: 0

## 2025-01-17 ASSESSMENT — PAIN DESCRIPTION - DESCRIPTORS
DESCRIPTORS: ACHING;DISCOMFORT
DESCRIPTORS: ACHING;DISCOMFORT;OTHER (COMMENT)

## 2025-01-17 ASSESSMENT — PAIN DESCRIPTION - ONSET: ONSET: ON-GOING

## 2025-01-17 ASSESSMENT — PAIN DESCRIPTION - PAIN TYPE: TYPE: CHRONIC PAIN

## 2025-01-17 ASSESSMENT — PAIN - FUNCTIONAL ASSESSMENT
PAIN_FUNCTIONAL_ASSESSMENT: ACTIVITIES ARE NOT PREVENTED
PAIN_FUNCTIONAL_ASSESSMENT: ACTIVITIES ARE NOT PREVENTED

## 2025-01-17 ASSESSMENT — PAIN DESCRIPTION - LOCATION
LOCATION: LEG;KNEE
LOCATION: BACK
LOCATION: ABDOMEN

## 2025-01-17 NOTE — PROGRESS NOTES
Occupational Therapy    OCCUPATIONAL THERAPY INITIAL EVALUATION    COLLEEN SALVADOR [facility]   [address] [place], [place]         Date:2025                                                  Patient Name: Barbara Hutson    MRN: [id number]    : 1945    Room: [address] [place] [address]      Evaluating OT: Tamra Hatch OTR/L   [id number]      Referring Provider:Shahrzad Tolbert APRN - CNP     Specific Provider Orders/Date:OT eval and treat 2025      Diagnosis:  Failure to thrive in adult [R62.7]     Pertinent Medical History: anxiety, asthma, CHF, COPD, HTN, TIA,       Precautions:  Fall Risk, O2, DROPLET PLUS      Assessment of current deficits    [x] Functional mobility  [x]ADLs  [x] Strength               []Cognition    [x] Functional transfers   [x] IADLs         [x] Safety Awareness   [x]Endurance    [] Fine Coordination              [x] Balance      [] Vision/perception   []Sensation     []Gross Motor Coordination  [] ROM  [] Delirium                   [] Motor Control     OT PLAN OF CARE   OT POC based on physician orders, patient diagnosis and results of clinical assessment    Frequency/Duration  2-4 days/wk for 2 - 4weeks PRN   Specific OT Treatment Interventions to include:   ADL retraining/adapted techniques and AE recommendations to increase functional independence within precautions                    Energy conservation techniques to improve tolerance for selfcare routine   Functional transfer/mobility training/DME recommendations for increased independence, safety and fall prevention         Patient/family education to increase safety and functional independence             Environmental modifications for safe mobility and completion of ADLs                             Therapeutic activity to improve functional performance during ADLs.                                         Therapeutic exercise to improve tolerance and functional strength for ADLs    Balance

## 2025-01-17 NOTE — H&P
Internal Medicine History & Physical     Name: Barbara Hutson  : 1945  Chief Complaint: Dizziness (Sent by PCP-unable to draw labs. Patient c/o dizziness and dehydration x few weeks )  Primary Care Physician: Ananth Huff MD  Admission date: 2025  Date of service: 2025   Unit: [address] MED SURG/TELE     History of Present Illness  Barbara is a 79 y.o. year old female with a past medical history of hypertension, hyperlipidemia, COPD, anxiety/depression, vascular dementia and CVA.  She presented to the ER on  with complaints of generalized weakness and possible CVA that began just prior to arrival.  Patient reports that for the last several weeks she has been having a cough with some chest congestion.  She denies any fever or chills.  She does report some intermittent episodes of nausea but denies any emesis.  She denies any pain or discomfort.  She reports that her appetite has not been great, only eating small amounts due to the nausea.  Her symptoms have been constant and moderate in severity, nothing seems to make it better or worse.  She reports that yesterday evening she felt somewhat dizzy and lightheaded, unsteady and off balance.  She expresses that her symptoms were similar to when she had her stroke so was concerned she was possibly having another stroke and came to the ER for further evaluation.  Upon arrival to the ER she was noted to be hemodynamically stable.  Lab work was obtained and noted BUN 22, creatinine 1.1, WBC 9.1 and hemoglobin 12.4.  UA was essentially unremarkable.  Chest x-ray noted mild cardiomegaly but no evidence of CHF.  CT of the head was also obtained and showed no acute intracranial findings.  She was given IV fluids and admitted for further management.    Currently she is seen sitting up in bed awake and alert no distress.  She reports that she does feel little bit better, not really coughing.  She denies any shortness of breath or difficulty breathing

## 2025-01-17 NOTE — PROGRESS NOTES
Spoke to daughter Zoila to review med list and code status. She states she is a full code but her sternum was rebuilt when she had open heart surgery. All questions answered.

## 2025-01-17 NOTE — ACP (ADVANCE CARE PLANNING)
1/17/2025  Advance Care Planning   Healthcare Decision Maker:    Primary Decision Maker: DaveZoila Davie Peng - [phone]    Click here to complete Healthcare Decision Makers including selection of the Healthcare Decision Maker Relationship (ie \"Primary\").

## 2025-01-17 NOTE — PLAN OF CARE
Problem: Chronic Conditions and Co-morbidities  Goal: Patient's chronic conditions and co-morbidity symptoms are monitored and maintained or improved  1/17/2025 1747 by Dian Piña RN  Outcome: Progressing  1/17/2025 0458 by Lidia Lind RN  Outcome: Progressing     Problem: Discharge Planning  Goal: Discharge to home or other facility with appropriate resources  1/17/2025 1747 by Dian Piña RN  Outcome: Progressing  1/17/2025 0458 by Lidia Lind RN  Outcome: Progressing     Problem: Safety - Adult  Goal: Free from fall injury  1/17/2025 1747 by Dian Piña RN  Outcome: Progressing  1/17/2025 0458 by Lidia Lind RN  Outcome: Progressing     Problem: Skin/Tissue Integrity  Goal: Absence of new skin breakdown  Description: 1.  Monitor for areas of redness and/or skin breakdown  2.  Assess vascular access sites hourly  3.  Every 4-6 hours minimum:  Change oxygen saturation probe site  4.  Every 4-6 hours:  If on nasal continuous positive airway pressure, respiratory therapy assess nares and determine need for appliance change or resting period.  1/17/2025 1747 by Dian Piña RN  Outcome: Progressing  1/17/2025 0458 by Lidia Lind RN  Outcome: Progressing     Problem: Pain  Goal: Verbalizes/displays adequate comfort level or baseline comfort level  1/17/2025 1747 by Dian Piña RN  Outcome: Progressing  1/17/2025 0458 by Lidia Lind RN  Outcome: Progressing     Problem: Nutrition Deficit:  Goal: Optimize nutritional status  Outcome: Progressing

## 2025-01-17 NOTE — PROGRESS NOTES
Spiritual Health History and Assessment/Progress Note  [facility]     Encounter, Rituals, Rites and Sacraments,  ,  ,      Name: Barbara Hutson MRN: [id number]    Age: 79 y.o.     Sex: female   Language: English   Congregation: [employer]   Failure to thrive in adult     Date: 1/17/2025                           Spiritual Assessment began in [facility] MED SURG/TELE        Referral/Consult From: Rounding   Encounter Overview/Reason:  Encounter, Rituals, Rites and Sacraments  Service Provided For: Patient    Danyelle, Belief, Meaning:   Patient is connected with a danyelle tradition or spiritual practice  Family/Friends No family/friends present      Importance and Influence:  Patient has no beliefs influential to healthcare decision-making identified during this visit  Family/Friends No family/friends present    Community:  Patient feels well-supported. Support system includes: Children  Family/Friends No family/friends present    Assessment and Plan of Care:     Patient Interventions include: Affirmed coping skills/support systems and Provided sacramental/Denominational ritual  Family/Friends Interventions include: No family/friends present    Patient Plan of Care: Spiritual Care available upon further referral  Family/Friends Plan of Care: Spiritual Care available upon further referral    Electronically signed by Chaplain Jazmyne on 1/17/2025 at 2:48 PM

## 2025-01-17 NOTE — DISCHARGE INSTR - COC
TRANSESOPHAGEAL  2014         ENDOSCOPY, COLON, DIAGNOSTIC      EYE SURGERY      lan lense implants    HEMORRHOID SURGERY      HYSTERECTOMY (CERVIX STATUS UNKNOWN)         Immunization History:   Immunization History   Administered Date(s) Administered    Influenza Virus Vaccine 2013    TDaP, ADACEL (age 10y-64y), BOOSTRIX (age 10y+), IM, 0.5mL 2016    Td, unspecified formulation 2012       Active Problems:  Patient Active Problem List   Diagnosis Code    Aortic stenosis I35.0    COPD (chronic obstructive pulmonary disease) ([facility]) J44.9    Hypertension I10    H/O hyperlipidemia Z86.39    CAD (coronary artery disease) I25.10    Acute CVA (cerebrovascular accident) ([facility]) I63.9    Cerebral aneurysm I67.1    Intracranial atherosclerosis I67.2    Stroke-like symptoms R29.90    TIA (transient ischemic attack) G45.9    Acute cystitis with hematuria N30.01    NINA (acute kidney injury) ([facility]) N17.9    Acute cystitis without hematuria N30.00    Failure to thrive in adult R62.7    Anxiety and depression F41.9, F32.A    Asthma J45.909    Chronic back pain M54.9, G89.29    GERD (gastroesophageal reflux disease) K21.9    Hyperlipidemia E78.5       Isolation/Infection:   Isolation            Droplet Plus          Patient Infection Status       Infection Onset Added Last Indicated Last Indicated By Review Planned Expiration Resolved Resolved By    COVID-19 (Rule Out) 25 Respiratory Panel, Molecular, with COVID-19 (Restricted: peds pts or suitable admitted adults) (Ordered) 25      Resolved    COVID-19 22 Respiratory Panel, Molecular, with COVID-19 (Restricted: peds pts or suitable admitted adults)   22 Infection                        Nurse Assessment:  Last Vital Signs: /61   Pulse 60   Temp 97.9 °F (36.6 °C)   Resp 18   Ht 1.626 m (5' 4\")   Wt 81.6 kg (179 lb 14.4 oz)   SpO2 98%   BMI 30.88 kg/m²     Last documented

## 2025-01-17 NOTE — PROGRESS NOTES
4 Eyes Skin Assessment     NAME:  Barbara Hutson  YOB: 1945  MEDICAL RECORD NUMBER:  [id number]    The patient is being assessed for  Admission    I agree that at least one RN has performed a thorough Head to Toe Skin Assessment on the patient. ALL assessment sites listed below have been assessed.      Areas assessed by both nurses:    Head, Face, Ears, Shoulders, Back, Chest, Arms, Elbows, Hands, Sacrum. Buttock, Coccyx, Ischium, and Legs. Feet and Heels        Does the Patient have a Wound? No noted wound(s)       Mark Prevention initiated by RN: Yes  Wound Care Orders initiated by RN: No    Pressure Injury (Stage 3,4, Unstageable, DTI, NWPT, and Complex wounds) if present, place Wound referral order by RN under : No    New Ostomies, if present place, Ostomy referral order under : No     Nurse 1 eSignature: Electronically signed by Lidia Lind RN on 1/17/25 at 5:30 AM EST    **SHARE this note so that the co-signing nurse can place an eSignature**    Nurse 2 eSignature: Electronically signed by Kenyetta Antunez RN on 1/17/25 at 6:06 AM EST

## 2025-01-17 NOTE — CARE COORDINATION
1/17/2025  Social Work Discharge Planning:Failure to thrive.  This worker met with Pt and called daughter on the phone to discuss  role and transition of care/discharge planning.They would like Pt to go to [facility]. Awaiting therapy evals. Referral was made. Waiting reply.They would like Pt to go LTC.  [facility]-they do not have an isolation room for Pt. Pt is currently on 3l o2 here.Electronically signed by DORINA Womack on 1/17/2025 at 11:10 AM     1/17/2025  Social Work Discharge Planning:Pt is COVID POS. FRANKO notified Pts daughter Zoila and requested additional SENTHIL choices.She chose [facility] . Referral was made . They accept and can take today. FRANKO set up ambulette transport for Pt to go at 7pm today. 7000, fantasma completed. FRANKO notified nurse here, [facility] liaison and daughter (ryan).Electronically signed by DORINA Womack on 1/17/2025 at 1:14 PM

## 2025-01-17 NOTE — PROGRESS NOTES
Spoke to ASHLEY Stahl regarding home medication updates. Also notified her of bladder scan of 433 ml, she would like staff to see if she will urinate on the commode.

## 2025-01-17 NOTE — PROGRESS NOTES
Comprehensive Nutrition Assessment    Type and Reason for Visit:  Initial, Positive nutrition screen    Nutrition Recommendations/Plan:   Continue current diet  Start HC/HP ONS BID  Will continue to monitor while inpatient     Malnutrition Assessment:  Malnutrition Status:  Insufficient data (01/17/25 0836)    Context:  Acute Illness     Findings of the 6 clinical characteristics of malnutrition:  Energy Intake:  Mild decrease in energy intake  Weight Loss:  Unable to assess (d/t large wt fluctuations)     Body Fat Loss:  Unable to assess (droplet iso)     Muscle Mass Loss:  Unable to assess    Fluid Accumulation:  No fluid accumulation     Strength:  Not Performed    Nutrition Assessment:    Pt w/ decreased appetite d/t nausea; FTT. Hx CAD, COPD, CVA, vascular dementia. RICHARD UBW/wt loss d/t large wt fluctuations (196#-231#). Continue current diet. Will add HC/HP ONS BID to promote protein/energy intake. Will continue to monitor.    Nutrition Related Findings:    A&O x4, disoriented @ times, edentulous, no edema, no I/O data, gluc 176, remeron Wound Type: None       Current Nutrition Intake & Therapies:    Average Meal Intake: Unable to assess  Average Supplements Intake: None Ordered  ADULT DIET; Regular  ADULT ORAL NUTRITION SUPPLEMENT; Breakfast, Dinner; Standard High Calorie/High Protein Oral Supplement    Anthropometric Measures:  Height: 162.6 cm (5' 4\")  Ideal Body Weight (IBW): 120 lbs (55 kg)    Admission Body Weight: 79.4 kg (175 lb) (1/16 unspecified method)  Current Body Weight: 81.6 kg (179 lb 14.3 oz) (1/17), 149.9 % IBW. Weight Source: Bed scale  Current BMI (kg/m2): 30.9  Usual Body Weight:  (RICHARD UBW d/t wt fluctuations (196#-231#))     Weight Adjustment For: No Adjustment     BMI Categories: Obese Class 1 (BMI 30.0-34.9)    Estimated Daily Nutrient Needs:  Energy Requirements Based On: Formula  Weight Used for Energy Requirements: Admission  Energy (kcal/day): 5588-6505  Weight Used for Protein

## 2025-01-17 NOTE — ED NOTES
ED to Inpatient Handoff Report    Notified floor that electronic handoff available and patient ready for transport to room 541.    Safety Risks: Risk of falls    Patient in Restraints: no    Constant Observer or Patient : no    Telemetry Monitoring Ordered: Yes          Order to transfer to unit without monitor: NO    Last MEWS: 1 Time completed: 2215    Deterioration Index: 41.18    Vitals:    01/16/25 1511 01/16/25 1810 01/16/25 2110 01/16/25 2207   BP: (!) 124/49 (!) 112/47 (!) 106/59 (!) 128/56   Pulse: 68 57 52 58   Resp: 20 20 21 16   Temp:    98.4 °F (36.9 °C)   TempSrc:    Oral   SpO2: 100% 97% 96% 96%   Weight:       Height:           Opportunity for questions and clarification was provided.     refused

## 2025-01-17 NOTE — CARE COORDINATION
Internal Medicine On-call Care Coordination Note    I was called by the ED physician because they recommended admission for this patient and we cover their PCP.  The history as I understand it after discussion with the ED physician is as follows:    Not eating or drinking well  CT head negative  No focal neuro deficits  Cardiac workup negative  Daughter doesn't feel comfortable bringing patient home  Needs admit for placement    I placed admission orders.  Including:    General admission orders  Reconciled home meds  PT/OT evals  SW for placement    Dr. Taylor, or our coverage will see the patient tomorrow for H&P.    Electronically signed by IMTIAZ Mendez CNP on 1/16/2025 at 9:27 PM

## 2025-01-18 ENCOUNTER — APPOINTMENT (OUTPATIENT)
Dept: CT IMAGING | Age: 80
DRG: 113 | End: 2025-01-18
Payer: MEDICAID

## 2025-01-18 PROBLEM — R40.0 SOMNOLENCE: Status: ACTIVE | Noted: 2025-01-18

## 2025-01-18 LAB
ALBUMIN SERPL-MCNC: 3.1 G/DL (ref 3.5–5.2)
ALBUMIN SERPL-MCNC: 3.3 G/DL (ref 3.5–5.2)
ALP SERPL-CCNC: 81 U/L (ref 35–104)
ALP SERPL-CCNC: 82 U/L (ref 35–104)
ALT SERPL-CCNC: 11 U/L (ref 0–32)
ALT SERPL-CCNC: 13 U/L (ref 0–32)
AMMONIA PLAS-SCNC: 15 UMOL/L (ref 11–51)
ANION GAP SERPL CALCULATED.3IONS-SCNC: 8 MMOL/L (ref 7–16)
ANION GAP SERPL CALCULATED.3IONS-SCNC: 9 MMOL/L (ref 7–16)
AST SERPL-CCNC: 13 U/L (ref 0–31)
AST SERPL-CCNC: 14 U/L (ref 0–31)
B.E.: 1.4 MMOL/L (ref -3–3)
BASOPHILS # BLD: 0.06 K/UL (ref 0–0.2)
BASOPHILS # BLD: 0.09 K/UL (ref 0–0.2)
BASOPHILS NFR BLD: 1 % (ref 0–2)
BASOPHILS NFR BLD: 1 % (ref 0–2)
BILIRUB SERPL-MCNC: 0.3 MG/DL (ref 0–1.2)
BILIRUB SERPL-MCNC: 1.1 MG/DL (ref 0–1.2)
BNP SERPL-MCNC: 710 PG/ML (ref 0–450)
BUN SERPL-MCNC: 25 MG/DL (ref 6–23)
BUN SERPL-MCNC: 26 MG/DL (ref 6–23)
CALCIUM SERPL-MCNC: 8.8 MG/DL (ref 8.6–10.2)
CALCIUM SERPL-MCNC: 8.8 MG/DL (ref 8.6–10.2)
CHLORIDE SERPL-SCNC: 106 MMOL/L (ref 98–107)
CHLORIDE SERPL-SCNC: 108 MMOL/L (ref 98–107)
CO2 SERPL-SCNC: 26 MMOL/L (ref 22–29)
CO2 SERPL-SCNC: 26 MMOL/L (ref 22–29)
COHB: 0 % (ref 0–1.5)
CREAT SERPL-MCNC: 1 MG/DL (ref 0.5–1)
CREAT SERPL-MCNC: 1.2 MG/DL (ref 0.5–1)
CRITICAL: ABNORMAL
D-DIMER QUANTITATIVE: 218 NG/ML DDU (ref 0–230)
DATE ANALYZED: ABNORMAL
DATE OF COLLECTION: ABNORMAL
EOSINOPHIL # BLD: 0.07 K/UL (ref 0.05–0.5)
EOSINOPHIL # BLD: 0.25 K/UL (ref 0.05–0.5)
EOSINOPHILS RELATIVE PERCENT: 1 % (ref 0–6)
EOSINOPHILS RELATIVE PERCENT: 3 % (ref 0–6)
ERYTHROCYTE [DISTWIDTH] IN BLOOD BY AUTOMATED COUNT: 13.2 % (ref 11.5–15)
ERYTHROCYTE [DISTWIDTH] IN BLOOD BY AUTOMATED COUNT: 13.3 % (ref 11.5–15)
ERYTHROCYTE [SEDIMENTATION RATE] IN BLOOD BY WESTERGREN METHOD: 10 MM/HR (ref 0–20)
GFR, ESTIMATED: 46 ML/MIN/1.73M2
GFR, ESTIMATED: 55 ML/MIN/1.73M2
GLUCOSE BLD-MCNC: 112 MG/DL (ref 74–99)
GLUCOSE SERPL-MCNC: 100 MG/DL (ref 74–99)
GLUCOSE SERPL-MCNC: 107 MG/DL (ref 74–99)
HCO3: 28.4 MMOL/L (ref 22–26)
HCT VFR BLD AUTO: 34.2 % (ref 34–48)
HCT VFR BLD AUTO: 36.5 % (ref 34–48)
HGB BLD-MCNC: 10.4 G/DL (ref 11.5–15.5)
HGB BLD-MCNC: 11.1 G/DL (ref 11.5–15.5)
HHB: 3.3 % (ref 0–5)
IMM GRANULOCYTES # BLD AUTO: 0.04 K/UL (ref 0–0.58)
IMM GRANULOCYTES # BLD AUTO: 0.07 K/UL (ref 0–0.58)
IMM GRANULOCYTES NFR BLD: 0 % (ref 0–5)
IMM GRANULOCYTES NFR BLD: 1 % (ref 0–5)
LAB: ABNORMAL
LACTATE BLDV-SCNC: 1 MMOL/L (ref 0.5–2.2)
LYMPHOCYTES NFR BLD: 1.92 K/UL (ref 1.5–4)
LYMPHOCYTES NFR BLD: 2.5 K/UL (ref 1.5–4)
LYMPHOCYTES RELATIVE PERCENT: 20 % (ref 20–42)
LYMPHOCYTES RELATIVE PERCENT: 27 % (ref 20–42)
Lab: 2105
MAGNESIUM SERPL-MCNC: 1.9 MG/DL (ref 1.6–2.6)
MCH RBC QN AUTO: 30.3 PG (ref 26–35)
MCH RBC QN AUTO: 30.7 PG (ref 26–35)
MCHC RBC AUTO-ENTMCNC: 30.4 G/DL (ref 32–34.5)
MCHC RBC AUTO-ENTMCNC: 30.4 G/DL (ref 32–34.5)
MCV RBC AUTO: 100.9 FL (ref 80–99.9)
MCV RBC AUTO: 99.7 FL (ref 80–99.9)
METHB: 0.3 % (ref 0–1.5)
MODE: ABNORMAL
MONOCYTES NFR BLD: 0.64 K/UL (ref 0.1–0.95)
MONOCYTES NFR BLD: 0.7 K/UL (ref 0.1–0.95)
MONOCYTES NFR BLD: 7 % (ref 2–12)
MONOCYTES NFR BLD: 7 % (ref 2–12)
NEUTROPHILS NFR BLD: 62 % (ref 43–80)
NEUTROPHILS NFR BLD: 71 % (ref 43–80)
NEUTS SEG NFR BLD: 5.72 K/UL (ref 1.8–7.3)
NEUTS SEG NFR BLD: 6.94 K/UL (ref 1.8–7.3)
O2 CONTENT: 15.4 ML/DL
O2 SATURATION: 96.7 % (ref 92–98.5)
O2HB: 96.4 % (ref 94–97)
OPERATOR ID: ABNORMAL
PATIENT TEMP: 37 C
PCO2: 56.5 MMHG (ref 35–45)
PH BLOOD GAS: 7.32 (ref 7.35–7.45)
PLATELET # BLD AUTO: 275 K/UL (ref 130–450)
PLATELET # BLD AUTO: 282 K/UL (ref 130–450)
PMV BLD AUTO: 10.3 FL (ref 7–12)
PMV BLD AUTO: 10.4 FL (ref 7–12)
PO2: 92.8 MMHG (ref 75–100)
POTASSIUM SERPL-SCNC: 4.2 MMOL/L (ref 3.5–5)
POTASSIUM SERPL-SCNC: 4.4 MMOL/L (ref 3.5–5)
PROT SERPL-MCNC: 5.3 G/DL (ref 6.4–8.3)
PROT SERPL-MCNC: 5.5 G/DL (ref 6.4–8.3)
RBC # BLD AUTO: 3.39 M/UL (ref 3.5–5.5)
RBC # BLD AUTO: 3.66 M/UL (ref 3.5–5.5)
SODIUM SERPL-SCNC: 141 MMOL/L (ref 132–146)
SODIUM SERPL-SCNC: 142 MMOL/L (ref 132–146)
SOURCE, BLOOD GAS: ABNORMAL
THB: 11.3 G/DL (ref 11.5–16.5)
TIME ANALYZED: 2111
TROPONIN I SERPL HS-MCNC: 14 NG/L (ref 0–9)
WBC OTHER # BLD: 9.2 K/UL (ref 4.5–11.5)
WBC OTHER # BLD: 9.8 K/UL (ref 4.5–11.5)

## 2025-01-18 PROCEDURE — 82805 BLOOD GASES W/O2 SATURATION: CPT

## 2025-01-18 PROCEDURE — 85025 COMPLETE CBC W/AUTO DIFF WBC: CPT

## 2025-01-18 PROCEDURE — 94640 AIRWAY INHALATION TREATMENT: CPT

## 2025-01-18 PROCEDURE — 6370000000 HC RX 637 (ALT 250 FOR IP): Performed by: NURSE PRACTITIONER

## 2025-01-18 PROCEDURE — 6360000002 HC RX W HCPCS: Performed by: NURSE PRACTITIONER

## 2025-01-18 PROCEDURE — 85379 FIBRIN DEGRADATION QUANT: CPT

## 2025-01-18 PROCEDURE — 83605 ASSAY OF LACTIC ACID: CPT

## 2025-01-18 PROCEDURE — 70450 CT HEAD/BRAIN W/O DYE: CPT

## 2025-01-18 PROCEDURE — 99291 CRITICAL CARE FIRST HOUR: CPT | Performed by: STUDENT IN AN ORGANIZED HEALTH CARE EDUCATION/TRAINING PROGRAM

## 2025-01-18 PROCEDURE — 2580000003 HC RX 258: Performed by: INTERNAL MEDICINE

## 2025-01-18 PROCEDURE — 83880 ASSAY OF NATRIURETIC PEPTIDE: CPT

## 2025-01-18 PROCEDURE — 82962 GLUCOSE BLOOD TEST: CPT

## 2025-01-18 PROCEDURE — 84484 ASSAY OF TROPONIN QUANT: CPT

## 2025-01-18 PROCEDURE — 1200000000 HC SEMI PRIVATE

## 2025-01-18 PROCEDURE — 84145 PROCALCITONIN (PCT): CPT

## 2025-01-18 PROCEDURE — 2700000000 HC OXYGEN THERAPY PER DAY

## 2025-01-18 PROCEDURE — 85652 RBC SED RATE AUTOMATED: CPT

## 2025-01-18 PROCEDURE — 84439 ASSAY OF FREE THYROXINE: CPT

## 2025-01-18 PROCEDURE — 80053 COMPREHEN METABOLIC PANEL: CPT

## 2025-01-18 PROCEDURE — 83735 ASSAY OF MAGNESIUM: CPT

## 2025-01-18 PROCEDURE — 86140 C-REACTIVE PROTEIN: CPT

## 2025-01-18 PROCEDURE — 93005 ELECTROCARDIOGRAM TRACING: CPT | Performed by: STUDENT IN AN ORGANIZED HEALTH CARE EDUCATION/TRAINING PROGRAM

## 2025-01-18 PROCEDURE — 82140 ASSAY OF AMMONIA: CPT

## 2025-01-18 RX ORDER — 0.9 % SODIUM CHLORIDE 0.9 %
500 INTRAVENOUS SOLUTION INTRAVENOUS ONCE
Status: COMPLETED | OUTPATIENT
Start: 2025-01-18 | End: 2025-01-18

## 2025-01-18 RX ADMIN — CLONAZEPAM 1 MG: 0.5 TABLET ORAL at 17:58

## 2025-01-18 RX ADMIN — EZETIMIBE 10 MG: 10 TABLET ORAL at 08:53

## 2025-01-18 RX ADMIN — ASPIRIN 81 MG: 81 TABLET, COATED ORAL at 08:53

## 2025-01-18 RX ADMIN — PAROXETINE 30 MG: 10 TABLET, FILM COATED ORAL at 08:52

## 2025-01-18 RX ADMIN — PANTOPRAZOLE SODIUM 40 MG: 40 TABLET, DELAYED RELEASE ORAL at 06:20

## 2025-01-18 RX ADMIN — LISINOPRIL 20 MG: 20 TABLET ORAL at 08:53

## 2025-01-18 RX ADMIN — CLOPIDOGREL BISULFATE 75 MG: 75 TABLET ORAL at 08:53

## 2025-01-18 RX ADMIN — SODIUM CHLORIDE 500 ML: 9 INJECTION, SOLUTION INTRAVENOUS at 20:32

## 2025-01-18 RX ADMIN — ATORVASTATIN CALCIUM 40 MG: 40 TABLET, FILM COATED ORAL at 08:53

## 2025-01-18 RX ADMIN — BUDESONIDE INHALATION 1000 MCG: 0.5 SUSPENSION RESPIRATORY (INHALATION) at 20:45

## 2025-01-18 RX ADMIN — ENOXAPARIN SODIUM 40 MG: 100 INJECTION SUBCUTANEOUS at 08:56

## 2025-01-18 RX ADMIN — GABAPENTIN 300 MG: 300 CAPSULE ORAL at 08:53

## 2025-01-18 RX ADMIN — CLONAZEPAM 1 MG: 0.5 TABLET ORAL at 08:53

## 2025-01-18 RX ADMIN — GABAPENTIN 300 MG: 300 CAPSULE ORAL at 14:06

## 2025-01-18 NOTE — PLAN OF CARE
Problem: Chronic Conditions and Co-morbidities  Goal: Patient's chronic conditions and co-morbidity symptoms are monitored and maintained or improved  1/18/2025 0510 by Lidia Lind RN  Outcome: Progressing  1/17/2025 1747 by Dian Piña RN  Outcome: Progressing     Problem: Discharge Planning  Goal: Discharge to home or other facility with appropriate resources  1/18/2025 0510 by Lidia Lind RN  Outcome: Progressing  1/17/2025 1747 by Dian Piña RN  Outcome: Progressing     Problem: Safety - Adult  Goal: Free from fall injury  1/18/2025 0510 by Lidia Lind RN  Outcome: Progressing  1/17/2025 1747 by Dian Piña RN  Outcome: Progressing     Problem: Skin/Tissue Integrity  Goal: Absence of new skin breakdown  Description: 1.  Monitor for areas of redness and/or skin breakdown  2.  Assess vascular access sites hourly  3.  Every 4-6 hours minimum:  Change oxygen saturation probe site  4.  Every 4-6 hours:  If on nasal continuous positive airway pressure, respiratory therapy assess nares and determine need for appliance change or resting period.  1/18/2025 0510 by Lidia Lind RN  Outcome: Progressing  1/17/2025 1747 by Dian Piña RN  Outcome: Progressing     Problem: Pain  Goal: Verbalizes/displays adequate comfort level or baseline comfort level  1/18/2025 0510 by Lidia Lind RN  Outcome: Progressing  1/17/2025 1747 by Dian Piña RN  Outcome: Progressing     Problem: Nutrition Deficit:  Goal: Optimize nutritional status  1/18/2025 0510 by Lidia Lind RN  Outcome: Progressing  1/17/2025 1747 by Dian Piña RN  Outcome: Progressing

## 2025-01-18 NOTE — PROGRESS NOTES
Spoke to patient's daughter Zoila, she does not want her mother to go to [facility] after reading reviews and would rather take her mom home. She states she can not  the patient tonight but can get her tomorrow. Will call [facility] and send message to Dr. Taylor's group.     Updated CHRIS Hendrickson, covering for Dr. Taylor. Attempted to call [facility], no answer.    0243: Attempted to call [facility]ania to update them, no answer.

## 2025-01-18 NOTE — PROGRESS NOTES
Internal Medicine Progress Note    Patient's name: Barbara Hutson  : 1945  Chief complaints (on day of admission): Dizziness (Sent by PCP-unable to draw labs. Patient c/o dizziness and dehydration x few weeks )  Admission date: 2025  Date of service: 2025   Room: [address] MED SURG/TELE  Primary care physician: Ananth Huff MD  Reason for visit: Follow-up for failure to thrive    Subjective  Barbara was seen and examined sitting up in bed awake and alert. She tells me she is feeling ok today. She denies any bothersome symptoms. She denies any needs this morning.     Review of Systems  There are no new complaints of chest pain, shortness of breath, abdominal pain, nausea, vomiting, diarrhea, constipation.    Hospital Medications  Current Facility-Administered Medications   Medication Dose Route Frequency Provider Last Rate Last Admin    0.9 % sodium chloride infusion   IntraVENous Continuous Vicente Taylor,    Stopped at 25 1840    gabapentin (NEURONTIN) capsule 300 mg  300 mg Oral TID Maribeth Brooks APRN - CNP   300 mg at 25 2135    ipratropium 0.5 mg-albuterol 2.5 mg (DUONEB) nebulizer solution 1 Dose  1 Dose Inhalation Q4H PRN Shahrzad Tolbert APRN - CNP   1 Dose at 25 0754    albuterol (PROVENTIL) (2.5 MG/3ML) 0.083% nebulizer solution 2.5 mg  2.5 mg Nebulization Q4H PRN Shahrzad Tolbert APRN - CNP        aspirin EC tablet 81 mg  81 mg Oral Daily Shahrzad Tolbert APRN - CNP   81 mg at 25 0841    atorvastatin (LIPITOR) tablet 40 mg  40 mg Oral Daily Shahrzad Tolbert APRN - CNP   40 mg at 25 0840    lisinopril (PRINIVIL;ZESTRIL) tablet 20 mg  20 mg Oral Daily Shahrzad Tolbert APRN - CNP   20 mg at 25 0842    budesonide (PULMICORT) nebulizer suspension 1,000 mcg  1,000 mcg Nebulization BID RT Shahrzad Tolbert APRN - CNP   1,000 mcg at 25    clonazePAM (KLONOPIN) tablet 1 mg  1 mg Oral BID Shahrzad Tolbert APRN - JU   1 mg at

## 2025-01-18 NOTE — CARE COORDINATION
Social Work:    Received a call from charge RN Lavern advising that patient's family preferred [facility], does not have Covid, has coronavirus which does not require an isolation bed, as indicated by LSW in error yesterday. Lavern asked social service to check with [facility] to see if they can take patient today as she is discharged.  Social service reviewed chart notes and patient is accepted at [facility]. A call was placed to Xi at [facility] who advised that they will not have a bed open until Monday. Charge RN was updated.    Electronically signed by DORINA Sanchez on 1/18/2025 at 10:23 AM

## 2025-01-19 LAB
ALBUMIN SERPL-MCNC: 3.5 G/DL (ref 3.5–5.2)
ALP SERPL-CCNC: 93 U/L (ref 35–104)
ALT SERPL-CCNC: 18 U/L (ref 0–32)
ANION GAP SERPL CALCULATED.3IONS-SCNC: 6 MMOL/L (ref 7–16)
AST SERPL-CCNC: 17 U/L (ref 0–31)
BASOPHILS # BLD: 0.09 K/UL (ref 0–0.2)
BASOPHILS NFR BLD: 1 % (ref 0–2)
BILIRUB SERPL-MCNC: 0.3 MG/DL (ref 0–1.2)
BUN SERPL-MCNC: 21 MG/DL (ref 6–23)
CALCIUM SERPL-MCNC: 9.1 MG/DL (ref 8.6–10.2)
CHLORIDE SERPL-SCNC: 107 MMOL/L (ref 98–107)
CO2 SERPL-SCNC: 29 MMOL/L (ref 22–29)
CREAT SERPL-MCNC: 1.1 MG/DL (ref 0.5–1)
CRP SERPL HS-MCNC: <3 MG/L (ref 0–5)
EOSINOPHIL # BLD: 0.28 K/UL (ref 0.05–0.5)
EOSINOPHILS RELATIVE PERCENT: 3 % (ref 0–6)
ERYTHROCYTE [DISTWIDTH] IN BLOOD BY AUTOMATED COUNT: 13.3 % (ref 11.5–15)
FLOW RATE: 3
GFR, ESTIMATED: 54 ML/MIN/1.73M2
GLUCOSE SERPL-MCNC: 118 MG/DL (ref 74–99)
HCT VFR BLD AUTO: 39.9 % (ref 34–48)
HGB BLD-MCNC: 12.1 G/DL (ref 11.5–15.5)
IMM GRANULOCYTES # BLD AUTO: <0.03 K/UL (ref 0–0.58)
IMM GRANULOCYTES NFR BLD: 0 % (ref 0–5)
LYMPHOCYTES NFR BLD: 1.74 K/UL (ref 1.5–4)
LYMPHOCYTES RELATIVE PERCENT: 21 % (ref 20–42)
MCH RBC QN AUTO: 30.3 PG (ref 26–35)
MCHC RBC AUTO-ENTMCNC: 30.3 G/DL (ref 32–34.5)
MCV RBC AUTO: 100 FL (ref 80–99.9)
MONOCYTES NFR BLD: 0.5 K/UL (ref 0.1–0.95)
MONOCYTES NFR BLD: 6 % (ref 2–12)
NEUTROPHILS NFR BLD: 68 % (ref 43–80)
NEUTS SEG NFR BLD: 5.62 K/UL (ref 1.8–7.3)
O2 DELIVERY DEVICE: ABNORMAL
PLATELET # BLD AUTO: 311 K/UL (ref 130–450)
PMV BLD AUTO: 10.2 FL (ref 7–12)
POC HCO3: 32.1 MMOL/L (ref 22–26)
POC O2 SATURATION: 96.3 % (ref 92–98.5)
POC PCO2: 60.7 MM HG (ref 35–45)
POC PH: 7.33 (ref 7.35–7.45)
POC PO2: 92.5 MM HG (ref 60–80)
POSITIVE BASE EXCESS, ART: 4.9 MMOL/L (ref 0–3)
POTASSIUM SERPL-SCNC: 4.4 MMOL/L (ref 3.5–5)
PROCALCITONIN SERPL-MCNC: 0.04 NG/ML (ref 0–0.08)
PROT SERPL-MCNC: 6 G/DL (ref 6.4–8.3)
RBC # BLD AUTO: 3.99 M/UL (ref 3.5–5.5)
SODIUM SERPL-SCNC: 142 MMOL/L (ref 132–146)
T4 FREE SERPL-MCNC: 1.2 NG/DL (ref 0.9–1.7)
WBC OTHER # BLD: 8.3 K/UL (ref 4.5–11.5)

## 2025-01-19 PROCEDURE — 85025 COMPLETE CBC W/AUTO DIFF WBC: CPT

## 2025-01-19 PROCEDURE — 36415 COLL VENOUS BLD VENIPUNCTURE: CPT

## 2025-01-19 PROCEDURE — 2500000003 HC RX 250 WO HCPCS: Performed by: NURSE PRACTITIONER

## 2025-01-19 PROCEDURE — 6360000002 HC RX W HCPCS: Performed by: NURSE PRACTITIONER

## 2025-01-19 PROCEDURE — 6370000000 HC RX 637 (ALT 250 FOR IP): Performed by: NURSE PRACTITIONER

## 2025-01-19 PROCEDURE — 94640 AIRWAY INHALATION TREATMENT: CPT

## 2025-01-19 PROCEDURE — 82803 BLOOD GASES ANY COMBINATION: CPT

## 2025-01-19 PROCEDURE — 80053 COMPREHEN METABOLIC PANEL: CPT

## 2025-01-19 PROCEDURE — 2700000000 HC OXYGEN THERAPY PER DAY

## 2025-01-19 PROCEDURE — 6370000000 HC RX 637 (ALT 250 FOR IP): Performed by: INTERNAL MEDICINE

## 2025-01-19 PROCEDURE — 1200000000 HC SEMI PRIVATE

## 2025-01-19 RX ADMIN — ATORVASTATIN CALCIUM 40 MG: 40 TABLET, FILM COATED ORAL at 09:29

## 2025-01-19 RX ADMIN — SODIUM CHLORIDE, PRESERVATIVE FREE 10 ML: 5 INJECTION INTRAVENOUS at 09:29

## 2025-01-19 RX ADMIN — BUDESONIDE INHALATION 1000 MCG: 0.5 SUSPENSION RESPIRATORY (INHALATION) at 10:03

## 2025-01-19 RX ADMIN — ENOXAPARIN SODIUM 40 MG: 100 INJECTION SUBCUTANEOUS at 09:29

## 2025-01-19 RX ADMIN — HYDROCODONE BITARTRATE AND ACETAMINOPHEN 1 TABLET: 5; 325 TABLET ORAL at 16:36

## 2025-01-19 RX ADMIN — LISINOPRIL 20 MG: 20 TABLET ORAL at 09:29

## 2025-01-19 RX ADMIN — EZETIMIBE 10 MG: 10 TABLET ORAL at 09:29

## 2025-01-19 RX ADMIN — ASPIRIN 81 MG: 81 TABLET, COATED ORAL at 09:29

## 2025-01-19 RX ADMIN — CLOPIDOGREL BISULFATE 75 MG: 75 TABLET ORAL at 09:29

## 2025-01-19 RX ADMIN — SODIUM CHLORIDE, PRESERVATIVE FREE 10 ML: 5 INJECTION INTRAVENOUS at 21:05

## 2025-01-19 RX ADMIN — BUDESONIDE INHALATION 1000 MCG: 0.5 SUSPENSION RESPIRATORY (INHALATION) at 20:25

## 2025-01-19 RX ADMIN — PANTOPRAZOLE SODIUM 40 MG: 40 TABLET, DELAYED RELEASE ORAL at 05:08

## 2025-01-19 ASSESSMENT — PAIN DESCRIPTION - DESCRIPTORS: DESCRIPTORS: DISCOMFORT

## 2025-01-19 ASSESSMENT — PAIN DESCRIPTION - ORIENTATION: ORIENTATION: LEFT

## 2025-01-19 ASSESSMENT — PAIN DESCRIPTION - LOCATION: LOCATION: BACK

## 2025-01-19 ASSESSMENT — PAIN SCALES - GENERAL: PAINLEVEL_OUTOF10: 8

## 2025-01-19 NOTE — SIGNIFICANT EVENT
RRT Note:    Responded to RRT page: per nursing staff, pt was lethargic to the point where she was not responding to being stuck. Nurse reported that she was sleepy when her shift started at 7pm and remained sleepy throughout the shift. Aide also reports that pt is more somnolent tonight as she was awake and active yesterday evening.     Upon evaluation, pt will respond to verbal/tactile stimuli but falls asleep easily. However, she will fully arouse and wake up to painful stimuli. Answers questions appropriately and responsive when fully woken up with painful stimuli. Moving all extremities and CN exam benign. Cardiac exam bradycardic in the 40-50's but nurse reports pt has been shaka throughout the day. Normotensive to hypertensive, afebrile, and satting well on 3L NC. Fingerstick glucose 112    Plan:   - Review of meds given: pt was given Klonopin 1mg at 6:00pm, other nighttime meds were not given b/c of pt's somnolence  - Check CT Head   - Check EKG - showed Sinus shaka 47 but no ischemic findings  - Check ABG - some hypercapnia, but this is likely not severe enough to cause pt's somnolence   - Check other labs for further eval, including Ammonia  - Hold medications that can cause somnolence, including narcotics  - Hold Metoprolol   - Given that pt is arousable and maintaining her airway, will continue to monitor for now with continuous pulse ox. If pt's mental status worsens, will need re-evaluation. Recheck ABG at midnight to monitor for worsening hypercapnia. Discussed plan with nursing staff.     Otherwise, rest of plan as per Primary Team and consultants.     -Mary Carmen Sky MD      --------------------------------------------    I personally spent 40 minutes providing critical care services to the patient, not including separate billable procedures.

## 2025-01-19 NOTE — PLAN OF CARE
Problem: Chronic Conditions and Co-morbidities  Goal: Patient's chronic conditions and co-morbidity symptoms are monitored and maintained or improved  Outcome: Progressing  Flowsheets (Taken 1/19/2025 0023)  Care Plan - Patient's Chronic Conditions and Co-Morbidity Symptoms are Monitored and Maintained or Improved: Monitor and assess patient's chronic conditions and comorbid symptoms for stability, deterioration, or improvement     Problem: Discharge Planning  Goal: Discharge to home or other facility with appropriate resources  Outcome: Progressing  Flowsheets (Taken 1/19/2025 0023)  Discharge to home or other facility with appropriate resources: Identify barriers to discharge with patient and caregiver     Problem: Safety - Adult  Goal: Free from fall injury  Outcome: Progressing     Problem: Skin/Tissue Integrity  Goal: Absence of new skin breakdown  Description: 1.  Monitor for areas of redness and/or skin breakdown  2.  Assess vascular access sites hourly  3.  Every 4-6 hours minimum:  Change oxygen saturation probe site  4.  Every 4-6 hours:  If on nasal continuous positive airway pressure, respiratory therapy assess nares and determine need for appliance change or resting period.  Outcome: Progressing     Problem: Pain  Goal: Verbalizes/displays adequate comfort level or baseline comfort level  Outcome: Progressing     Problem: Nutrition Deficit:  Goal: Optimize nutritional status  Outcome: Progressing

## 2025-01-19 NOTE — PROGRESS NOTES
Internal Medicine Progress Note    Patient's name: Barbara Hutson  : 1945  Chief complaints (on day of admission): Dizziness (Sent by PCP-unable to draw labs. Patient c/o dizziness and dehydration x few weeks )  Admission date: 2025  Date of service: 2025   Room: [address] MED SURG/TELE  Primary care physician: Ananth Huff MD  Reason for visit: Follow-up for failure to thrive    Subjective  Barbara was seen and examined resting in bed. She awakens to voice and tells me she is tired. She denies any complaints or concerns. She falls back to sleep. Family changed their mind on discharge plans and now awaiting bed at [facility].    Review of Systems  There are no new complaints of chest pain, shortness of breath, abdominal pain, nausea, vomiting, diarrhea, constipation.    Hospital Medications  Current Facility-Administered Medications   Medication Dose Route Frequency Provider Last Rate Last Admin    0.9 % sodium chloride infusion   IntraVENous Continuous Vicente Taylor DO   Stopped at 25 1840    [Held by provider] gabapentin (NEURONTIN) capsule 300 mg  300 mg Oral TID Maribeth Brooks APRN - CNP   300 mg at 25 1406    ipratropium 0.5 mg-albuterol 2.5 mg (DUONEB) nebulizer solution 1 Dose  1 Dose Inhalation Q4H PRN Shahrzad Tolbert APRN - CNP   1 Dose at 25 0754    albuterol (PROVENTIL) (2.5 MG/3ML) 0.083% nebulizer solution 2.5 mg  2.5 mg Nebulization Q4H PRN Shahrzad Tolbert APRN - CNP        aspirin EC tablet 81 mg  81 mg Oral Daily Shahrzad Tolbert APRN - CNP   81 mg at 25 0853    atorvastatin (LIPITOR) tablet 40 mg  40 mg Oral Daily Shahrzad Tolbert APRN - CNP   40 mg at 25 0853    lisinopril (PRINIVIL;ZESTRIL) tablet 20 mg  20 mg Oral Daily Shahrzad Tolbert APRN - CNP   20 mg at 25 0853    budesonide (PULMICORT) nebulizer suspension 1,000 mcg  1,000 mcg Nebulization BID RT Lacivita, Shahrzad, IMTIAZ - CNP   1,000 mcg at 25    [Held by

## 2025-01-19 NOTE — PROGRESS NOTES
Pt is new onset lethargy and low BP. Perfect served NP Gina Moseley. Ordered to give 500cc ns bolus and call RRT if lethargy continues or patient worsens.

## 2025-01-20 VITALS
TEMPERATURE: 98.2 F | WEIGHT: 181.2 LBS | HEIGHT: 64 IN | DIASTOLIC BLOOD PRESSURE: 60 MMHG | BODY MASS INDEX: 30.93 KG/M2 | SYSTOLIC BLOOD PRESSURE: 142 MMHG | OXYGEN SATURATION: 94 % | RESPIRATION RATE: 16 BRPM | HEART RATE: 60 BPM

## 2025-01-20 LAB
ALBUMIN SERPL-MCNC: 3.5 G/DL (ref 3.5–5.2)
ALP SERPL-CCNC: 98 U/L (ref 35–104)
ALT SERPL-CCNC: 16 U/L (ref 0–32)
ANION GAP SERPL CALCULATED.3IONS-SCNC: 9 MMOL/L (ref 7–16)
AST SERPL-CCNC: 14 U/L (ref 0–31)
BASOPHILS # BLD: 0.08 K/UL (ref 0–0.2)
BASOPHILS NFR BLD: 1 % (ref 0–2)
BILIRUB SERPL-MCNC: 0.4 MG/DL (ref 0–1.2)
BUN SERPL-MCNC: 22 MG/DL (ref 6–23)
CALCIUM SERPL-MCNC: 9.4 MG/DL (ref 8.6–10.2)
CHLORIDE SERPL-SCNC: 108 MMOL/L (ref 98–107)
CO2 SERPL-SCNC: 27 MMOL/L (ref 22–29)
CREAT SERPL-MCNC: 1 MG/DL (ref 0.5–1)
EKG ATRIAL RATE: 47 BPM
EKG P AXIS: 36 DEGREES
EKG P-R INTERVAL: 152 MS
EKG Q-T INTERVAL: 496 MS
EKG QRS DURATION: 80 MS
EKG QTC CALCULATION (BAZETT): 438 MS
EKG R AXIS: 33 DEGREES
EKG T AXIS: 62 DEGREES
EKG VENTRICULAR RATE: 47 BPM
EOSINOPHIL # BLD: 0.31 K/UL (ref 0.05–0.5)
EOSINOPHILS RELATIVE PERCENT: 4 % (ref 0–6)
ERYTHROCYTE [DISTWIDTH] IN BLOOD BY AUTOMATED COUNT: 13.2 % (ref 11.5–15)
GFR, ESTIMATED: 60 ML/MIN/1.73M2
GLUCOSE SERPL-MCNC: 103 MG/DL (ref 74–99)
HCT VFR BLD AUTO: 42.2 % (ref 34–48)
HGB BLD-MCNC: 13 G/DL (ref 11.5–15.5)
IMM GRANULOCYTES # BLD AUTO: 0.04 K/UL (ref 0–0.58)
IMM GRANULOCYTES NFR BLD: 1 % (ref 0–5)
LYMPHOCYTES NFR BLD: 1.81 K/UL (ref 1.5–4)
LYMPHOCYTES RELATIVE PERCENT: 22 % (ref 20–42)
MCH RBC QN AUTO: 30.3 PG (ref 26–35)
MCHC RBC AUTO-ENTMCNC: 30.8 G/DL (ref 32–34.5)
MCV RBC AUTO: 98.4 FL (ref 80–99.9)
MONOCYTES NFR BLD: 0.64 K/UL (ref 0.1–0.95)
MONOCYTES NFR BLD: 8 % (ref 2–12)
NEUTROPHILS NFR BLD: 65 % (ref 43–80)
NEUTS SEG NFR BLD: 5.39 K/UL (ref 1.8–7.3)
PLATELET # BLD AUTO: 334 K/UL (ref 130–450)
PMV BLD AUTO: 10.1 FL (ref 7–12)
POTASSIUM SERPL-SCNC: 4 MMOL/L (ref 3.5–5)
PROT SERPL-MCNC: 6 G/DL (ref 6.4–8.3)
RBC # BLD AUTO: 4.29 M/UL (ref 3.5–5.5)
SODIUM SERPL-SCNC: 144 MMOL/L (ref 132–146)
WBC OTHER # BLD: 8.3 K/UL (ref 4.5–11.5)

## 2025-01-20 PROCEDURE — 2700000000 HC OXYGEN THERAPY PER DAY

## 2025-01-20 PROCEDURE — 36415 COLL VENOUS BLD VENIPUNCTURE: CPT

## 2025-01-20 PROCEDURE — 2500000003 HC RX 250 WO HCPCS: Performed by: NURSE PRACTITIONER

## 2025-01-20 PROCEDURE — 80053 COMPREHEN METABOLIC PANEL: CPT

## 2025-01-20 PROCEDURE — 93010 ELECTROCARDIOGRAM REPORT: CPT | Performed by: INTERNAL MEDICINE

## 2025-01-20 PROCEDURE — 6370000000 HC RX 637 (ALT 250 FOR IP): Performed by: NURSE PRACTITIONER

## 2025-01-20 PROCEDURE — 94640 AIRWAY INHALATION TREATMENT: CPT

## 2025-01-20 PROCEDURE — 6360000002 HC RX W HCPCS: Performed by: NURSE PRACTITIONER

## 2025-01-20 PROCEDURE — 85025 COMPLETE CBC W/AUTO DIFF WBC: CPT

## 2025-01-20 PROCEDURE — 6370000000 HC RX 637 (ALT 250 FOR IP): Performed by: INTERNAL MEDICINE

## 2025-01-20 RX ORDER — GABAPENTIN 300 MG/1
300 CAPSULE ORAL 2 TIMES DAILY
Status: DISCONTINUED | OUTPATIENT
Start: 2025-01-20 | End: 2025-01-20 | Stop reason: HOSPADM

## 2025-01-20 RX ORDER — GABAPENTIN 300 MG/1
300 CAPSULE ORAL 2 TIMES DAILY
Refills: 0 | DISCHARGE
Start: 2025-01-20 | End: 2025-01-23

## 2025-01-20 RX ADMIN — SODIUM CHLORIDE, PRESERVATIVE FREE 10 ML: 5 INJECTION INTRAVENOUS at 08:21

## 2025-01-20 RX ADMIN — METOPROLOL TARTRATE 25 MG: 25 TABLET, FILM COATED ORAL at 08:21

## 2025-01-20 RX ADMIN — LISINOPRIL 20 MG: 20 TABLET ORAL at 08:20

## 2025-01-20 RX ADMIN — ENOXAPARIN SODIUM 40 MG: 100 INJECTION SUBCUTANEOUS at 08:21

## 2025-01-20 RX ADMIN — ATORVASTATIN CALCIUM 40 MG: 40 TABLET, FILM COATED ORAL at 08:20

## 2025-01-20 RX ADMIN — ASPIRIN 81 MG: 81 TABLET, COATED ORAL at 08:21

## 2025-01-20 RX ADMIN — BUDESONIDE INHALATION 1000 MCG: 0.5 SUSPENSION RESPIRATORY (INHALATION) at 07:51

## 2025-01-20 RX ADMIN — FUROSEMIDE 20 MG: 20 TABLET ORAL at 10:24

## 2025-01-20 RX ADMIN — PAROXETINE 30 MG: 10 TABLET, FILM COATED ORAL at 08:20

## 2025-01-20 RX ADMIN — CLOPIDOGREL BISULFATE 75 MG: 75 TABLET ORAL at 08:21

## 2025-01-20 RX ADMIN — EZETIMIBE 10 MG: 10 TABLET ORAL at 08:20

## 2025-01-20 RX ADMIN — GABAPENTIN 300 MG: 300 CAPSULE ORAL at 08:21

## 2025-01-20 RX ADMIN — ONDANSETRON 4 MG: 4 TABLET, ORALLY DISINTEGRATING ORAL at 02:09

## 2025-01-20 NOTE — CARE COORDINATION
1/20/2025  Social Work Discharge Planning:  [facility]. No precert is needed. JULIA and 7000 done. SW set up ambulette transport via [facility] for Pt to go at 11am  today. FRANKO notified [facility] liaison, [facility] liaison, nurse here and daughter. Electronically signed by DORINA Womack on 1/20/2025 at 9:55 AM

## 2025-01-20 NOTE — PROGRESS NOTES
Internal Medicine Progress Note    Patient's name: Barbara Hutson  : 1945  Chief complaints (on day of admission): Dizziness (Sent by PCP-unable to draw labs. Patient c/o dizziness and dehydration x few weeks )  Admission date: 2025  Date of service: 2025   Room: [address] MED SURG/TELE  Primary care physician: Ananth Huff MD  Reason for visit: Follow-up for failure to thrive    Subjective  Barbara is seen lying in bed awake and alert, seems tired but comfortable.  She reports that she still does not feel great, frustrated that she needs to go back to rehab.  She does report an occasional cough with some chest congestion although not bringing up much sputum.  She denies any fever or chills.  Denies any nausea or vomiting.  In discussion with nursing she has had no further issues since the RRT Saturday night and mentation seems improved.  No other issues or concerns from nursing.    Review of Systems  Full 10 point review of systems negative unless mentioned above.    Hospital Medications  Current Facility-Administered Medications   Medication Dose Route Frequency Provider Last Rate Last Admin    0.9 % sodium chloride infusion   IntraVENous Continuous Vicente Taylor DO   Stopped at 25 1840    [Held by provider] gabapentin (NEURONTIN) capsule 300 mg  300 mg Oral TID Maribeth Brooks APRN - CNP   300 mg at 25 1406    ipratropium 0.5 mg-albuterol 2.5 mg (DUONEB) nebulizer solution 1 Dose  1 Dose Inhalation Q4H PRN Shahrzad Tolbert APRN - CNP   1 Dose at 25 0754    albuterol (PROVENTIL) (2.5 MG/3ML) 0.083% nebulizer solution 2.5 mg  2.5 mg Nebulization Q4H PRN Shahrzad Tolbert APRN - CNP        aspirin EC tablet 81 mg  81 mg Oral Daily Shahrzad Tolbert APRN - CNP   81 mg at 25 0929    atorvastatin (LIPITOR) tablet 40 mg  40 mg Oral Daily Shahrzad Tolbert APRN - CNP   40 mg at 25 0929    lisinopril (PRINIVIL;ZESTRIL) tablet 20 mg  20 mg Oral Daily Yvon

## 2025-01-20 NOTE — PROGRESS NOTES
Nurse to nurse called to Danae at [facility]. Questions answered paperwork sent with transportation.

## 2025-01-20 NOTE — PLAN OF CARE
Problem: Chronic Conditions and Co-morbidities  Goal: Patient's chronic conditions and co-morbidity symptoms are monitored and maintained or improved  Outcome: Progressing  Flowsheets (Taken 1/19/2025 8325)  Care Plan - Patient's Chronic Conditions and Co-Morbidity Symptoms are Monitored and Maintained or Improved: Monitor and assess patient's chronic conditions and comorbid symptoms for stability, deterioration, or improvement     Problem: Discharge Planning  Goal: Discharge to home or other facility with appropriate resources  Outcome: Progressing  Flowsheets (Taken 1/19/2025 3759)  Discharge to home or other facility with appropriate resources: Identify barriers to discharge with patient and caregiver     Problem: Safety - Adult  Goal: Free from fall injury  Outcome: Progressing     Problem: Skin/Tissue Integrity  Goal: Absence of new skin breakdown  Description: 1.  Monitor for areas of redness and/or skin breakdown  2.  Assess vascular access sites hourly  3.  Every 4-6 hours minimum:  Change oxygen saturation probe site  4.  Every 4-6 hours:  If on nasal continuous positive airway pressure, respiratory therapy assess nares and determine need for appliance change or resting period.  Outcome: Progressing     Problem: Pain  Goal: Verbalizes/displays adequate comfort level or baseline comfort level  Outcome: Progressing     Problem: Nutrition Deficit:  Goal: Optimize nutritional status  Outcome: Progressing

## 2025-01-20 NOTE — DISCHARGE SUMMARY
Internal Medicine Discharge Summary    NAME: Barbara Hutson :  1945  MRN:  [id number] PCP:Ananth Huff MD    ADMITTED: 2025   DISCHARGED: 2025 11:28 AM    ADMITTING PHYSICIAN: Vicente Taylor DO    PCP: Ananth Huff MD    CONSULTANT(S):   IP CONSULT TO INTERNAL MEDICINE  IP CONSULT TO SOCIAL WORK  IP CONSULT TO VASCULAR ACCESS TEAM     ADMITTING DIAGNOSIS:   Failure to thrive in adult [R62.7]     Please see H&P for further details    DISCHARGE DIAGNOSES:   Active Hospital Problems    Diagnosis     Somnolence [R40.0]     Anxiety and depression [F41.9, F32.A]     Asthma [J45.909]     Chronic back pain [M54.9, G89.29]     GERD (gastroesophageal reflux disease) [K21.9]     Hyperlipidemia [E78.5]     Failure to thrive in adult [R62.7]     CAD (coronary artery disease) [I25.10]     COPD (chronic obstructive pulmonary disease) (HCC) [J44.9]     Aortic stenosis [I35.0]        BRIEF HISTORY OF PRESENT ILLNESS: Barbara Hutson is a 79 y.o. female patient of Ananth Huff MD who  has a past medical history of Anxiety and depression, Arthritis, Asthma, Borderline diabetes, CAD (coronary artery disease), CHF (congestive heart failure) ([facility]), Chronic back pain, COPD (chronic obstructive pulmonary disease) ([facility]), Diverticulosis, GERD (gastroesophageal reflux disease), Hyperlipidemia, Hypertension, Left rotator cuff tear, Pneumonia, PONV (postoperative nausea and vomiting), Prolonged emergence from general anesthesia, and TIA (transient ischemic attack). who originally had concerns including Dizziness (Sent by PCP-unable to draw labs. Patient c/o dizziness and dehydration x few weeks ). at presentation on 2025, and was found to have Failure to thrive in adult [R62.7] after workup.    Please see H&P for further details.    HOSPITAL COURSE:   The patient presented to the hospital with the chief complaint of Dizziness (Sent by PCP-unable to draw labs. Patient c/o dizziness and dehydration 
3.39*   HGB 12.4 11.1* 10.4*   HCT 41.3 34.8 34.2   MCV 98.6 96.4 100.9*   MCH 29.6 30.7 30.7   MCHC 30.0* 31.9* 30.4*   RDW 13.0 13.0 13.2    289 275   MPV 9.9 9.9 10.4     Lab Results   Component Value Date    LABA1C 5.4 12/11/2023    LABA1C 5.5 04/10/2023    LABA1C 5.8 06/16/2015     Lab Results   Component Value Date    INR 1.3 12/10/2023    INR 1.2 04/09/2023    INR 1.2 05/13/2022    PROTIME 14.5 (H) 12/10/2023    PROTIME 12.7 (H) 04/09/2023    PROTIME 13.2 (H) 05/13/2022      Lab Results   Component Value Date    TSH 0.59 01/17/2025    TSH 0.64 12/11/2023    TSH 0.889 07/15/2014     Lab Results   Component Value Date    TRIG 126 12/11/2023    TRIG 187 (H) 04/10/2023    TRIG 172 (H) 09/17/2018    HDL 48 12/11/2023    HDL 32 04/10/2023    HDL 32 09/17/2018     No results for input(s): \"MG\" in the last 72 hours.    No results for input(s): \"CKTOTAL\", \"CKMB\", \"TROPONINI\" in the last 72 hours.   No results for input(s): \"LACTA\" in the last 72 hours.    IMAGING:  CT Head W/O Contrast    Result Date: 1/16/2025  EXAMINATION: CT OF THE HEAD WITHOUT CONTRAST  1/16/2025 7:42 pm TECHNIQUE: CT of the head was performed without the administration of intravenous contrast. Automated exposure control, iterative reconstruction, and/or weight based adjustment of the mA/kV was utilized to reduce the radiation dose to as low as reasonably achievable. COMPARISON: None. HISTORY: ORDERING SYSTEM PROVIDED HISTORY: left sided facial droop, leaning to left TECHNOLOGIST PROVIDED HISTORY: Has a \"code stroke\" or \"stroke alert\" been called?->No Reason for exam:->left sided facial droop, leaning to left Decision Support Exception - unselect if not a suspected or confirmed emergency medical condition->Emergency Medical Condition (MA) FINDINGS: BRAIN/VENTRICLES: There is no acute intracranial hemorrhage, mass effect or midline shift.  No abnormal extra-axial fluid collection.  The gray-white differentiation is maintained without

## 2025-01-23 LAB
EKG ATRIAL RATE: 50 BPM
EKG P AXIS: 59 DEGREES
EKG P-R INTERVAL: 168 MS
EKG Q-T INTERVAL: 472 MS
EKG QRS DURATION: 70 MS
EKG QTC CALCULATION (BAZETT): 430 MS
EKG R AXIS: 61 DEGREES
EKG T AXIS: 63 DEGREES
EKG VENTRICULAR RATE: 50 BPM

## 2025-01-24 NOTE — PROGRESS NOTES
Physician Progress Note      PATIENT:               DWAYNE MALONE  [facility] #:                  [id number]  :                       1945  ADMIT DATE:       2025 1:33 PM  DISCH DATE:        2025 11:28 AM  RESPONDING  PROVIDER #:        Vicente JACKSON DO          QUERY TEXT:    Patient admitted with FTT. Noted documentation of acute encephalopathy. In   order to support the diagnosis of acute encephalopathy, please include   additional clinical indicators in your documentation.  Or please document if   the diagnosis of acute encephalopathy has been ruled out after further study.    The medical record reflects the following:    Risk Factors: FTT, corona virus infection, dementia, age 79    Clinical Indicators: ED Report 2025 Psych:  Normal Affect. Behavior   normal.    h and p report 2025-awake and alert, oriented although some forgetfulness   is noted, pleasant, seems tired but comfortable. Neurologic: following   commands, speech is clear. Vascular Dementia Continue supportive care.     Mentation is stable this AM. Anxiety/Depression. AO-3    IM Progress Notes 2025 General: AAO to person/place/time/purpose,   intermittent forgetfulness/confusion, NAD, no labored breathing    flow sheet - Oriented X4; Oriented/disoriented at times    Discharge summary 2024 cute Encephalopathy RRT  due to AMS and   somnolence CT head unremarkable Suspect medication induced as was ordered   Klonopin but not taking at home. Klonopin has been discontinued. Cut back   Neurontin dosing slightly based on CrCl  Mentation remains stable at this time    Lab values -Ammonia (umol/L) 15 on   CT head on 74-[id number]-Mh acute intracranial abnormality.      Treatment: Remeron/Paxil, discontinued klonopin , image studies,    Thank you    Fior Lopez [facility]  ,  CDS  Options provided:  -- acute encephalopathy due to drug present as evidenced by, Please document   evidence.  -- acute

## 2025-03-10 NOTE — PROGRESS NOTES
Patient presenting with a longstanding history of both mood and psychotic symptoms. Current presentation appears to be psychotic in nature.    Plan:   Transition to Invega Sustenna with starting dose at 234 mg today (day 1). Booster shot will be on day 4 (3/13/25) at a dose of 156 mg. Maintenance dose of 234 mg will be administered every 4 weeks thereafter. Approximately on 4/10/45  Continue Lexapro to 20 mg qAM for mood and anxiety  Continue melatonin 6mg for sleep regulation purposes   Physical Therapy  Facility/Department: [address] MED SURG/TELE  Physical Therapy Initial Assessment    Name: Barbara Hutson  : 1945  MRN: [id number]  Date of Service: 2025    Attending Provider:  Vicente Taylor DO    Evaluating PT:  Mumtaz Hall Jr., P.T.    Room #:  [facility]  Diagnosis:  Failure to thrive in adult [R62.7]  Pertinent PMHx/PSHx:  Vascular dementia, CVA  Precautions:  falls, bed/chair alarm, confusion at times, droplet+ precautions due to COVID r/o    SUBJECTIVE:    Pt lives with her daughter, son-in-law, and their 16 y.o. daughter in a 2 story home with 4 stairs and 1 rail to enter. She has 1st floor bed and bath.  Pt ambulated with no AD PTA.    OBJECTIVE:   Initial Evaluation  Date: 25 Treatment Short Term/ Long Term   Goals   Was pt agreeable to Eval/treatment? yes     Does pt have pain? C/o pain from base of her neck down her back.     Bed Mobility  Rolling: SBA  Supine to sit: MIN A  Sit to supine: MIN A  Scooting: MIN A  Independent    Transfers Sit to stand: MOD A  Stand to sit: MIN A  Stand pivot: NA  SBA   Ambulation   3 small side steps while holding bed rail with MOD A  100 feet with ww if needed SBA   Stair negotiation: ascended and descended NA  4 steps with 1 rail SBA   AM-PAC 6 Clicks        BLE ROM is WFL.   BLE strength is grossly 4-/5 to 4/5.   Balance: sitting is MIN A to MOD A due to posterior lean and standing with no AD, but holding onto bed rail is MIN A/MOD A  Endurance: fair-    Patient education  Pt educated on bed mobility and transfers.     Patient response to education:   Pt verbalized understanding Pt demonstrated skill Pt requires further education in this area   yes yes yes     ASSESSMENT:    Conditions Requiring Skilled Therapeutic Intervention:    [x]Decreased strength     []Decreased ROM  [x]Decreased functional mobility  [x]Decreased balance   [x]Decreased endurance   []Decreased posture  []Decreased sensation  []Decreased coordination

## 2025-04-02 ENCOUNTER — HOSPITAL ENCOUNTER (INPATIENT)
Age: 80
LOS: 7 days | Discharge: HOME | DRG: 140 | End: 2025-04-09
Attending: EMERGENCY MEDICINE | Admitting: INTERNAL MEDICINE
Payer: MEDICAID

## 2025-04-02 ENCOUNTER — APPOINTMENT (OUTPATIENT)
Dept: GENERAL RADIOLOGY | Age: 80
DRG: 140 | End: 2025-04-02
Payer: MEDICAID

## 2025-04-02 DIAGNOSIS — F32.A ANXIETY AND DEPRESSION: ICD-10-CM

## 2025-04-02 DIAGNOSIS — D64.9 ANEMIA REQUIRING TRANSFUSIONS: Primary | ICD-10-CM

## 2025-04-02 DIAGNOSIS — J45.909 UNCOMPLICATED ASTHMA, UNSPECIFIED ASTHMA SEVERITY, UNSPECIFIED WHETHER PERSISTENT: ICD-10-CM

## 2025-04-02 DIAGNOSIS — J44.9 CHRONIC OBSTRUCTIVE PULMONARY DISEASE, UNSPECIFIED COPD TYPE (HCC): ICD-10-CM

## 2025-04-02 DIAGNOSIS — F41.9 ANXIETY AND DEPRESSION: ICD-10-CM

## 2025-04-02 LAB
ANION GAP SERPL CALCULATED.3IONS-SCNC: 10 MMOL/L (ref 7–16)
B PARAP IS1001 DNA NPH QL NAA+NON-PROBE: NOT DETECTED
B PERT DNA SPEC QL NAA+PROBE: NOT DETECTED
BASOPHILS # BLD: 0.03 K/UL (ref 0–0.2)
BASOPHILS NFR BLD: 0 % (ref 0–2)
BNP SERPL-MCNC: 389 PG/ML (ref 0–450)
BUN SERPL-MCNC: 22 MG/DL (ref 6–23)
C PNEUM DNA NPH QL NAA+NON-PROBE: NOT DETECTED
CALCIUM SERPL-MCNC: 8.6 MG/DL (ref 8.6–10.2)
CHLORIDE SERPL-SCNC: 98 MMOL/L (ref 98–107)
CO2 SERPL-SCNC: 29 MMOL/L (ref 22–29)
CREAT SERPL-MCNC: 1.2 MG/DL (ref 0.5–1)
EOSINOPHIL # BLD: 0.01 K/UL (ref 0.05–0.5)
EOSINOPHILS RELATIVE PERCENT: 0 % (ref 0–6)
ERYTHROCYTE [DISTWIDTH] IN BLOOD BY AUTOMATED COUNT: 12.9 % (ref 11.5–15)
FLUAV RNA NPH QL NAA+NON-PROBE: NOT DETECTED
FLUBV RNA NPH QL NAA+NON-PROBE: NOT DETECTED
GFR, ESTIMATED: 47 ML/MIN/1.73M2
GLUCOSE SERPL-MCNC: 99 MG/DL (ref 74–99)
HADV DNA NPH QL NAA+NON-PROBE: NOT DETECTED
HCOV 229E RNA NPH QL NAA+NON-PROBE: NOT DETECTED
HCOV HKU1 RNA NPH QL NAA+NON-PROBE: NOT DETECTED
HCOV NL63 RNA NPH QL NAA+NON-PROBE: NOT DETECTED
HCOV OC43 RNA NPH QL NAA+NON-PROBE: NOT DETECTED
HCT VFR BLD AUTO: 20.4 % (ref 34–48)
HGB BLD-MCNC: 5.9 G/DL (ref 11.5–15.5)
HMPV RNA NPH QL NAA+NON-PROBE: DETECTED
HPIV1 RNA NPH QL NAA+NON-PROBE: NOT DETECTED
HPIV2 RNA NPH QL NAA+NON-PROBE: NOT DETECTED
HPIV3 RNA NPH QL NAA+NON-PROBE: NOT DETECTED
HPIV4 RNA NPH QL NAA+NON-PROBE: NOT DETECTED
IMM GRANULOCYTES # BLD AUTO: <0.03 K/UL (ref 0–0.58)
IMM GRANULOCYTES NFR BLD: 0 % (ref 0–5)
INFLUENZA A BY PCR: NOT DETECTED
INFLUENZA B BY PCR: NOT DETECTED
LYMPHOCYTES NFR BLD: 1.07 K/UL (ref 1.5–4)
LYMPHOCYTES RELATIVE PERCENT: 13 % (ref 20–42)
M PNEUMO DNA NPH QL NAA+NON-PROBE: NOT DETECTED
MCH RBC QN AUTO: 30.4 PG (ref 26–35)
MCHC RBC AUTO-ENTMCNC: 28.9 G/DL (ref 32–34.5)
MCV RBC AUTO: 105.2 FL (ref 80–99.9)
MONOCYTES NFR BLD: 0.77 K/UL (ref 0.1–0.95)
MONOCYTES NFR BLD: 9 % (ref 2–12)
NEUTROPHILS NFR BLD: 78 % (ref 43–80)
NEUTS SEG NFR BLD: 6.62 K/UL (ref 1.8–7.3)
PLATELET # BLD AUTO: 147 K/UL (ref 130–450)
PMV BLD AUTO: 9.9 FL (ref 7–12)
POTASSIUM SERPL-SCNC: 3.4 MMOL/L (ref 3.5–5)
RBC # BLD AUTO: 1.94 M/UL (ref 3.5–5.5)
RBC # BLD: ABNORMAL 10*6/UL
RSV RNA NPH QL NAA+NON-PROBE: NOT DETECTED
RV+EV RNA NPH QL NAA+NON-PROBE: NOT DETECTED
SARS-COV-2 RDRP RESP QL NAA+PROBE: NOT DETECTED
SARS-COV-2 RNA NPH QL NAA+NON-PROBE: NOT DETECTED
SODIUM SERPL-SCNC: 137 MMOL/L (ref 132–146)
SPECIMEN DESCRIPTION: ABNORMAL
SPECIMEN DESCRIPTION: NORMAL
TROPONIN I SERPL HS-MCNC: 20 NG/L (ref 0–9)
WBC OTHER # BLD: 8.5 K/UL (ref 4.5–11.5)

## 2025-04-02 PROCEDURE — 6370000000 HC RX 637 (ALT 250 FOR IP): Performed by: INTERNAL MEDICINE

## 2025-04-02 PROCEDURE — 0202U NFCT DS 22 TRGT SARS-COV-2: CPT

## 2025-04-02 PROCEDURE — 36430 TRANSFUSION BLD/BLD COMPNT: CPT

## 2025-04-02 PROCEDURE — 96374 THER/PROPH/DIAG INJ IV PUSH: CPT

## 2025-04-02 PROCEDURE — 83880 ASSAY OF NATRIURETIC PEPTIDE: CPT

## 2025-04-02 PROCEDURE — 71045 X-RAY EXAM CHEST 1 VIEW: CPT

## 2025-04-02 PROCEDURE — 99285 EMERGENCY DEPT VISIT HI MDM: CPT

## 2025-04-02 PROCEDURE — 86923 COMPATIBILITY TEST ELECTRIC: CPT

## 2025-04-02 PROCEDURE — 86900 BLOOD TYPING SEROLOGIC ABO: CPT

## 2025-04-02 PROCEDURE — P9016 RBC LEUKOCYTES REDUCED: HCPCS

## 2025-04-02 PROCEDURE — 86850 RBC ANTIBODY SCREEN: CPT

## 2025-04-02 PROCEDURE — 6370000000 HC RX 637 (ALT 250 FOR IP): Performed by: EMERGENCY MEDICINE

## 2025-04-02 PROCEDURE — 87502 INFLUENZA DNA AMP PROBE: CPT

## 2025-04-02 PROCEDURE — 85025 COMPLETE CBC W/AUTO DIFF WBC: CPT

## 2025-04-02 PROCEDURE — 6360000002 HC RX W HCPCS: Performed by: EMERGENCY MEDICINE

## 2025-04-02 PROCEDURE — 80048 BASIC METABOLIC PNL TOTAL CA: CPT

## 2025-04-02 PROCEDURE — 2060000000 HC ICU INTERMEDIATE R&B

## 2025-04-02 PROCEDURE — 6360000002 HC RX W HCPCS: Performed by: INTERNAL MEDICINE

## 2025-04-02 PROCEDURE — 2700000000 HC OXYGEN THERAPY PER DAY

## 2025-04-02 PROCEDURE — 87635 SARS-COV-2 COVID-19 AMP PRB: CPT

## 2025-04-02 PROCEDURE — 84484 ASSAY OF TROPONIN QUANT: CPT

## 2025-04-02 PROCEDURE — 86901 BLOOD TYPING SEROLOGIC RH(D): CPT

## 2025-04-02 PROCEDURE — 30233N1 TRANSFUSION OF NONAUTOLOGOUS RED BLOOD CELLS INTO PERIPHERAL VEIN, PERCUTANEOUS APPROACH: ICD-10-PCS | Performed by: INTERNAL MEDICINE

## 2025-04-02 PROCEDURE — 94640 AIRWAY INHALATION TREATMENT: CPT

## 2025-04-02 RX ORDER — POTASSIUM CHLORIDE 7.45 MG/ML
10 INJECTION INTRAVENOUS PRN
Status: DISCONTINUED | OUTPATIENT
Start: 2025-04-02 | End: 2025-04-09 | Stop reason: HOSPADM

## 2025-04-02 RX ORDER — ALBUTEROL SULFATE 0.83 MG/ML
2.5 SOLUTION RESPIRATORY (INHALATION) EVERY 6 HOURS PRN
Status: DISCONTINUED | OUTPATIENT
Start: 2025-04-02 | End: 2025-04-09 | Stop reason: HOSPADM

## 2025-04-02 RX ORDER — PANTOPRAZOLE SODIUM 40 MG/1
40 TABLET, DELAYED RELEASE ORAL EVERY MORNING
COMMUNITY

## 2025-04-02 RX ORDER — IPRATROPIUM BROMIDE AND ALBUTEROL SULFATE 2.5; .5 MG/3ML; MG/3ML
3 SOLUTION RESPIRATORY (INHALATION) ONCE
Status: COMPLETED | OUTPATIENT
Start: 2025-04-02 | End: 2025-04-02

## 2025-04-02 RX ORDER — CLONAZEPAM 1 MG/1
1 TABLET ORAL EVERY 12 HOURS PRN
Status: ON HOLD | COMMUNITY
End: 2025-04-08

## 2025-04-02 RX ORDER — METHYLPREDNISOLONE SODIUM SUCCINATE 125 MG/2ML
125 INJECTION INTRAMUSCULAR; INTRAVENOUS ONCE
Status: COMPLETED | OUTPATIENT
Start: 2025-04-02 | End: 2025-04-02

## 2025-04-02 RX ORDER — FENTANYL CITRATE 50 UG/ML
50 INJECTION, SOLUTION INTRAMUSCULAR; INTRAVENOUS ONCE
Status: DISCONTINUED | OUTPATIENT
Start: 2025-04-02 | End: 2025-04-02

## 2025-04-02 RX ORDER — BUDESONIDE 0.5 MG/2ML
1000 INHALANT ORAL
Status: DISCONTINUED | OUTPATIENT
Start: 2025-04-02 | End: 2025-04-09 | Stop reason: HOSPADM

## 2025-04-02 RX ORDER — EZETIMIBE 10 MG/1
10 TABLET ORAL DAILY
Status: DISCONTINUED | OUTPATIENT
Start: 2025-04-03 | End: 2025-04-09 | Stop reason: HOSPADM

## 2025-04-02 RX ORDER — LOPERAMIDE HYDROCHLORIDE 2 MG/1
2 CAPSULE ORAL EVERY 6 HOURS PRN
COMMUNITY

## 2025-04-02 RX ORDER — LISINOPRIL 20 MG/1
20 TABLET ORAL DAILY
Status: DISCONTINUED | OUTPATIENT
Start: 2025-04-03 | End: 2025-04-09 | Stop reason: HOSPADM

## 2025-04-02 RX ORDER — ACETAMINOPHEN 325 MG/1
650 TABLET ORAL EVERY 6 HOURS PRN
Status: DISCONTINUED | OUTPATIENT
Start: 2025-04-02 | End: 2025-04-09 | Stop reason: HOSPADM

## 2025-04-02 RX ORDER — BENZOCAINE/MENTHOL 6 MG-10 MG
LOZENGE MUCOUS MEMBRANE EVERY 6 HOURS PRN
COMMUNITY

## 2025-04-02 RX ORDER — HYDROCODONE BITARTRATE AND ACETAMINOPHEN 5; 325 MG/1; MG/1
1 TABLET ORAL EVERY 6 HOURS PRN
Status: ON HOLD | COMMUNITY
End: 2025-04-08

## 2025-04-02 RX ORDER — ACETAMINOPHEN 650 MG/1
650 SUPPOSITORY RECTAL EVERY 6 HOURS PRN
Status: DISCONTINUED | OUTPATIENT
Start: 2025-04-02 | End: 2025-04-09 | Stop reason: HOSPADM

## 2025-04-02 RX ORDER — PANTOPRAZOLE SODIUM 40 MG/1
40 TABLET, DELAYED RELEASE ORAL EVERY MORNING
Status: DISCONTINUED | OUTPATIENT
Start: 2025-04-03 | End: 2025-04-04

## 2025-04-02 RX ORDER — ASPIRIN 81 MG/1
81 TABLET ORAL EVERY MORNING
Status: DISCONTINUED | OUTPATIENT
Start: 2025-04-03 | End: 2025-04-09 | Stop reason: HOSPADM

## 2025-04-02 RX ORDER — MIRTAZAPINE 15 MG/1
15 TABLET, FILM COATED ORAL NIGHTLY
Status: DISCONTINUED | OUTPATIENT
Start: 2025-04-02 | End: 2025-04-09 | Stop reason: HOSPADM

## 2025-04-02 RX ORDER — CLONAZEPAM 0.5 MG/1
1 TABLET ORAL EVERY 12 HOURS PRN
Status: DISCONTINUED | OUTPATIENT
Start: 2025-04-02 | End: 2025-04-04

## 2025-04-02 RX ORDER — ACETAMINOPHEN 500 MG
1000 TABLET ORAL EVERY 6 HOURS PRN
COMMUNITY

## 2025-04-02 RX ORDER — PANTOPRAZOLE SODIUM 40 MG/10ML
40 INJECTION, POWDER, LYOPHILIZED, FOR SOLUTION INTRAVENOUS 2 TIMES DAILY
Status: DISCONTINUED | OUTPATIENT
Start: 2025-04-02 | End: 2025-04-04

## 2025-04-02 RX ORDER — ONDANSETRON 4 MG/1
4 TABLET, FILM COATED ORAL EVERY 8 HOURS PRN
COMMUNITY

## 2025-04-02 RX ORDER — MAGNESIUM SULFATE IN WATER 40 MG/ML
2000 INJECTION, SOLUTION INTRAVENOUS PRN
Status: DISCONTINUED | OUTPATIENT
Start: 2025-04-02 | End: 2025-04-09 | Stop reason: HOSPADM

## 2025-04-02 RX ORDER — METOPROLOL TARTRATE 25 MG/1
12.5 TABLET, FILM COATED ORAL 2 TIMES DAILY
Status: DISCONTINUED | OUTPATIENT
Start: 2025-04-02 | End: 2025-04-09 | Stop reason: HOSPADM

## 2025-04-02 RX ORDER — SODIUM CHLORIDE 0.9 % (FLUSH) 0.9 %
10 SYRINGE (ML) INJECTION PRN
Status: DISCONTINUED | OUTPATIENT
Start: 2025-04-02 | End: 2025-04-09 | Stop reason: HOSPADM

## 2025-04-02 RX ORDER — ATORVASTATIN CALCIUM 40 MG/1
40 TABLET, FILM COATED ORAL DAILY
Status: DISCONTINUED | OUTPATIENT
Start: 2025-04-02 | End: 2025-04-09 | Stop reason: HOSPADM

## 2025-04-02 RX ORDER — HYDROCODONE BITARTRATE AND ACETAMINOPHEN 5; 325 MG/1; MG/1
1 TABLET ORAL EVERY 6 HOURS PRN
Refills: 0 | Status: DISCONTINUED | OUTPATIENT
Start: 2025-04-02 | End: 2025-04-09 | Stop reason: HOSPADM

## 2025-04-02 RX ORDER — FUROSEMIDE 20 MG/1
20 TABLET ORAL EVERY MORNING
Status: DISCONTINUED | OUTPATIENT
Start: 2025-04-03 | End: 2025-04-09 | Stop reason: HOSPADM

## 2025-04-02 RX ORDER — CLOPIDOGREL BISULFATE 75 MG/1
75 TABLET ORAL DAILY
Status: DISCONTINUED | OUTPATIENT
Start: 2025-04-03 | End: 2025-04-09 | Stop reason: HOSPADM

## 2025-04-02 RX ORDER — ONDANSETRON 2 MG/ML
4 INJECTION INTRAMUSCULAR; INTRAVENOUS EVERY 6 HOURS PRN
Status: DISCONTINUED | OUTPATIENT
Start: 2025-04-02 | End: 2025-04-09 | Stop reason: HOSPADM

## 2025-04-02 RX ORDER — SENNOSIDES A AND B 8.6 MG/1
1 TABLET, FILM COATED ORAL DAILY PRN
Status: DISCONTINUED | OUTPATIENT
Start: 2025-04-02 | End: 2025-04-09 | Stop reason: HOSPADM

## 2025-04-02 RX ORDER — ONDANSETRON 4 MG/1
4 TABLET, ORALLY DISINTEGRATING ORAL EVERY 8 HOURS PRN
Status: DISCONTINUED | OUTPATIENT
Start: 2025-04-02 | End: 2025-04-09 | Stop reason: HOSPADM

## 2025-04-02 RX ORDER — SODIUM CHLORIDE 9 MG/ML
INJECTION, SOLUTION INTRAVENOUS PRN
Status: DISCONTINUED | OUTPATIENT
Start: 2025-04-02 | End: 2025-04-09 | Stop reason: HOSPADM

## 2025-04-02 RX ORDER — POTASSIUM CHLORIDE 1500 MG/1
40 TABLET, EXTENDED RELEASE ORAL PRN
Status: DISCONTINUED | OUTPATIENT
Start: 2025-04-02 | End: 2025-04-09 | Stop reason: HOSPADM

## 2025-04-02 RX ORDER — GUAIFENESIN/DEXTROMETHORPHAN 100-10MG/5
20 SYRUP ORAL EVERY 6 HOURS PRN
Status: DISCONTINUED | OUTPATIENT
Start: 2025-04-02 | End: 2025-04-03

## 2025-04-02 RX ORDER — ALBUTEROL SULFATE 0.83 MG/ML
2.5 SOLUTION RESPIRATORY (INHALATION) EVERY 6 HOURS PRN
COMMUNITY

## 2025-04-02 RX ORDER — SODIUM CHLORIDE 0.9 % (FLUSH) 0.9 %
10 SYRINGE (ML) INJECTION EVERY 12 HOURS SCHEDULED
Status: DISCONTINUED | OUTPATIENT
Start: 2025-04-02 | End: 2025-04-09 | Stop reason: HOSPADM

## 2025-04-02 RX ADMIN — HYDROCODONE BITARTRATE AND ACETAMINOPHEN 1 TABLET: 5; 325 TABLET ORAL at 18:37

## 2025-04-02 RX ADMIN — METHYLPREDNISOLONE SODIUM SUCCINATE 125 MG: 125 INJECTION INTRAMUSCULAR; INTRAVENOUS at 13:45

## 2025-04-02 RX ADMIN — BUDESONIDE 1000 MCG: 0.5 SUSPENSION RESPIRATORY (INHALATION) at 20:45

## 2025-04-02 RX ADMIN — MIRTAZAPINE 15 MG: 15 TABLET, FILM COATED ORAL at 21:42

## 2025-04-02 RX ADMIN — DESMOPRESSIN ACETATE 40 MG: 0.2 TABLET ORAL at 16:45

## 2025-04-02 RX ADMIN — METOPROLOL TARTRATE 12.5 MG: 25 TABLET, FILM COATED ORAL at 21:46

## 2025-04-02 RX ADMIN — CLONAZEPAM 1 MG: 0.5 TABLET ORAL at 21:42

## 2025-04-02 RX ADMIN — IPRATROPIUM BROMIDE AND ALBUTEROL SULFATE 3 DOSE: .5; 3 SOLUTION RESPIRATORY (INHALATION) at 13:46

## 2025-04-02 ASSESSMENT — PAIN - FUNCTIONAL ASSESSMENT: PAIN_FUNCTIONAL_ASSESSMENT: NONE - DENIES PAIN

## 2025-04-02 NOTE — ED NOTES
[address]  
78 y/o female to the ed with a c/c of SOB and dizziness that has been persistent over the past 2-3 days. Upon arrival, patient noted PW&D and presenting in NAD. Patient denies chest pain or difficulty breathing. Patient denies ABD pain, n/v/d or fever. Patient noted with + pms x 4, pupils PERRLA @ 3 and lung sounds that are noted with wheezes to all fields bilaterally. EMS reported that patient received breathing treatment approximately 1 hour prior to , and received 1 DUONEB treatment en route. Patient placed on telemetry, BP and pulse ox. 12-lead EKG performed. Vitals noted as recorded. PIV placed with labs drawn and sent. Call light placed within patient's reach, and bed in lowest position with side rails up x 2 for safety. Provider at the bedside for assessment.     
External catheter placed per patient's request  
Patient resting in position of comfort on bed presenting in no acute/ apparent distress. Respirations are noted even and unlabored with good rise and fall of the chest observed. Patient updated with current plan of care (POC) and all questions/ concerns addressed. Patient voices no needs at this time. Bed noted in lowest position, locked, with side rails X 2 up for patient safety. Will continue to monitor patient for acute changes.        [x] Side rails up    [x] Cart in lowest position    [x] Family at bedside    [x] Call light within reach      
Patient resting in position of comfort on bed presenting in no acute/ apparent distress. Respirations are noted even and unlabored with good rise and fall of the chest observed. Patient updated with current plan of care (POC) and all questions/ concerns addressed. Patient voices no needs at this time. Bed noted in lowest position, locked, with side rails X 2 up for patient safety. Will continue to monitor patient for acute changes.      [x] Side rails up    [x] Cart in lowest position    [] Family at bedside    [x] Call light within reach      
Patient resting in position of comfort on bed presenting in no acute/ apparent distress. Respirations are noted even and unlabored with good rise and fall of the chest observed. Patient updated with current plan of care (POC) and all questions/ concerns addressed. Patient voices no needs at this time. Bed noted in lowest position, locked, with side rails X 2 up for patient safety. Will continue to monitor patient for acute changes.      [x] Side rails up    [x] Cart in lowest position    [x] Family at bedside    [x] Call light within reach      
Patient resting in position of comfort on bed presenting in no acute/ apparent distress. Respirations are noted even and unlabored with good rise and fall of the chest observed. Patient updated with current plan of care (POC) and all questions/ concerns addressed. Patient voices no needs at this time. Bed noted in lowest position, locked, with side rails X 2 up for patient safety. Will continue to monitor patient for acute changes.      [x] Side rails up    [x] Cart in lowest position    [x] Family at bedside    [x] Call light within reach      
Report called to SONG Mantilla with all questions/ concerns addressed.   
components:    Potassium 3.4 (*)     Creatinine 1.2 (*)     Est, Glom Filt Rate 47 (*)     All other components within normal limits   TROPONIN - Abnormal; Notable for the following components:    Troponin, High Sensitivity 20 (*)     All other components within normal limits        Background  History:   Past Medical History:   Diagnosis Date    Anxiety and depression     Arthritis     Asthma     Borderline diabetes     CAD (coronary artery disease)     CHF (congestive heart failure) (HCC)     Chronic back pain     COPD (chronic obstructive pulmonary disease) (HCC)     Diverticulosis     GERD (gastroesophageal reflux disease)     Hyperlipidemia     Hypertension     Left rotator cuff tear     Pneumonia 2012    PONV (postoperative nausea and vomiting)     Prolonged emergence from general anesthesia     TIA (transient ischemic attack) 09/2012       Assessment    Vitals:        Vitals:    04/02/25 1725 04/02/25 1730 04/02/25 1735 04/02/25 1740   BP: (!) 122/45 (!) 124/48 (!) 114/48 (!) 115/55   Pulse:       Resp:       Temp: 98.4 °F (36.9 °C) 98.5 °F (36.9 °C) 98.2 °F (36.8 °C) 98.4 °F (36.9 °C)   TempSrc:  Oral Oral    SpO2:       Weight:       Height:           PO Status: Regular    O2 Flow Rate: O2 Device: Nasal cannula O2 Flow Rate (L/min): 3 L/min    Cardiac Rhythm: NSR    Last documented pain medication administered: Norco 5mg @ 1840    NIH Score: NIH       Active LDA's:   Peripheral IV 04/02/25 Left Antecubital (Active)       Pertinent or High Risk Medications/Drips: yes   If Yes, please provide details: 2nd unit whole blood currently infusing @ 200mL/ Hr2      Blood Product Administration: no  If Yes, please provide details:     Recommendation    Incomplete orders   Additional Comments:    If any further questions, please call Sending RN     Electronically signed by: Electronically signed by David Benton RN on 4/2/2025 at 6:42 PM

## 2025-04-02 NOTE — CARE COORDINATION
Internal Medicine On-call Care Coordination Note    I was called by the ED physician because they recommended admission for this patient and I cover their PCP.  The history as I understand it after discussion with the ED physician is as follows:    She presented to the hospital with a chief complaint of shortness of breath, wheezing, dizziness.  She wears 3 L/min nasal cannula oxygen at baseline  She was found to be hypoxic and required 4 L/min nasal cannula oxygen  In the ED they found anemia and she required a blood transfusion.    I placed admission orders.  Including:    Anemia:   Check Fe/Ferritin/TIBC/vitamin B12/folate  Follow H&H  Hemoccult positive in the ED  Sp pRBC transfusion in the ED  GI consultation for GI bleeding evaluation    COPD exacerbation:   Bronchodilators  NCO2 prn-- at baseline on 3 L/min  Pulm consultation    Essential Hypertension  Continue Lisinopril and Lopressor  Monitor BP trends and adjust as indicated     Anxiety/Depression/Dementia  Resume home psych meds     Hx CVA   ASA/Plavix on hold due to GIB  Statin    Dr. Taylor or his coverage will see the patient tomorrow for H&P.    Electronically signed by Vicente Taylor DO on 4/2/2025 at 4:10 PM

## 2025-04-02 NOTE — ED PROVIDER NOTES
.  79-year-old female who presents from local nursing facility for shortness of breath cough and wheezing.  Patient has been having symptoms for couple days they also report some mild dizziness though patient reports no other symptoms and reports symptoms resolved now.  She reports no chest pain nausea vomiting diarrhea abdominal pain leg swelling she reports no use of anticoagulation.  She reportedly was 78% on the home 3 L that she has they increased it to 4 L and responded well nursing staff and EMS providers report wheezing on exam    The history is provided by the patient.   Shortness of Breath  Severity:  Moderate  Onset quality:  Gradual  Duration:  1 day  Timing:  Intermittent  Progression:  Waxing and waning  Chronicity:  New  Relieved by:  Nothing  Worsened by:  Nothing  Ineffective treatments:  None tried  Associated symptoms: cough and wheezing    Associated symptoms: no chest pain, no ear pain, no fever, no headaches, no rash, no sore throat and no vomiting         Review of Systems   Constitutional:  Negative for chills and fever.   HENT:  Negative for ear pain, sinus pressure and sore throat.    Eyes:  Negative for pain, discharge and redness.   Respiratory:  Positive for cough, shortness of breath and wheezing.    Cardiovascular:  Negative for chest pain.   Gastrointestinal:  Negative for abdominal distention, diarrhea, nausea and vomiting.   Genitourinary:  Negative for dysuria and frequency.   Musculoskeletal:  Negative for arthralgias and back pain.   Skin:  Negative for rash and wound.   Neurological:  Negative for weakness and headaches.   Hematological:  Negative for adenopathy.   All other systems reviewed and are negative.       Physical Exam  Constitutional:       Appearance: She is well-developed.   HENT:      Head: Normocephalic and atraumatic.   Eyes:      Extraocular Movements: Extraocular movements intact.      Pupils: Pupils are equal, round, and reactive to light.   Cardiovascular:

## 2025-04-03 LAB
ABO/RH: NORMAL
ALBUMIN SERPL-MCNC: 3.2 G/DL (ref 3.5–5.2)
ALP SERPL-CCNC: 88 U/L (ref 35–104)
ALT SERPL-CCNC: 12 U/L (ref 0–32)
ANION GAP SERPL CALCULATED.3IONS-SCNC: 14 MMOL/L (ref 7–16)
ANTIBODY SCREEN: NEGATIVE
ARM BAND NUMBER: NORMAL
AST SERPL-CCNC: 17 U/L (ref 0–31)
BASOPHILS # BLD: 0.01 K/UL (ref 0–0.2)
BASOPHILS NFR BLD: 0 % (ref 0–2)
BILIRUB SERPL-MCNC: 0.7 MG/DL (ref 0–1.2)
BLOOD BANK BLOOD PRODUCT EXPIRATION DATE: NORMAL
BLOOD BANK BLOOD PRODUCT EXPIRATION DATE: NORMAL
BLOOD BANK DISPENSE STATUS: NORMAL
BLOOD BANK DISPENSE STATUS: NORMAL
BLOOD BANK ISBT PRODUCT BLOOD TYPE: 5100
BLOOD BANK ISBT PRODUCT BLOOD TYPE: 5100
BLOOD BANK PRODUCT CODE: NORMAL
BLOOD BANK PRODUCT CODE: NORMAL
BLOOD BANK SAMPLE EXPIRATION: NORMAL
BLOOD BANK UNIT TYPE AND RH: NORMAL
BLOOD BANK UNIT TYPE AND RH: NORMAL
BPU ID: NORMAL
BPU ID: NORMAL
BUN SERPL-MCNC: 28 MG/DL (ref 6–23)
CALCIUM SERPL-MCNC: 9.5 MG/DL (ref 8.6–10.2)
CHLORIDE SERPL-SCNC: 99 MMOL/L (ref 98–107)
CO2 SERPL-SCNC: 25 MMOL/L (ref 22–29)
COMPONENT: NORMAL
COMPONENT: NORMAL
CREAT SERPL-MCNC: 1.1 MG/DL (ref 0.5–1)
CROSSMATCH RESULT: NORMAL
CROSSMATCH RESULT: NORMAL
EOSINOPHIL # BLD: 0 K/UL (ref 0.05–0.5)
EOSINOPHILS RELATIVE PERCENT: 0 % (ref 0–6)
ERYTHROCYTE [DISTWIDTH] IN BLOOD BY AUTOMATED COUNT: 13.4 % (ref 11.5–15)
FERRITIN SERPL-MCNC: 232 NG/ML
FOLATE SERPL-MCNC: 18.6 NG/ML (ref 4.8–24.2)
GFR, ESTIMATED: 52 ML/MIN/1.73M2
GLUCOSE SERPL-MCNC: 235 MG/DL (ref 74–99)
HCT VFR BLD AUTO: 38.1 % (ref 34–48)
HGB BLD-MCNC: 12.4 G/DL (ref 11.5–15.5)
IMM GRANULOCYTES # BLD AUTO: 0.06 K/UL (ref 0–0.58)
IMM GRANULOCYTES NFR BLD: 1 % (ref 0–5)
IRON SATN MFR SERPL: 14 % (ref 15–50)
IRON SERPL-MCNC: 38 UG/DL (ref 37–145)
LYMPHOCYTES NFR BLD: 0.62 K/UL (ref 1.5–4)
LYMPHOCYTES RELATIVE PERCENT: 5 % (ref 20–42)
MCH RBC QN AUTO: 30.8 PG (ref 26–35)
MCHC RBC AUTO-ENTMCNC: 32.5 G/DL (ref 32–34.5)
MCV RBC AUTO: 94.8 FL (ref 80–99.9)
MONOCYTES NFR BLD: 0.19 K/UL (ref 0.1–0.95)
MONOCYTES NFR BLD: 2 % (ref 2–12)
NEUTROPHILS NFR BLD: 93 % (ref 43–80)
NEUTS SEG NFR BLD: 10.93 K/UL (ref 1.8–7.3)
PLATELET # BLD AUTO: 261 K/UL (ref 130–450)
PMV BLD AUTO: 10.3 FL (ref 7–12)
POTASSIUM SERPL-SCNC: 4.6 MMOL/L (ref 3.5–5)
PROT SERPL-MCNC: 6.5 G/DL (ref 6.4–8.3)
RBC # BLD AUTO: 4.02 M/UL (ref 3.5–5.5)
SODIUM SERPL-SCNC: 138 MMOL/L (ref 132–146)
TIBC SERPL-MCNC: 267 UG/DL (ref 250–450)
TRANSFUSION STATUS: NORMAL
TRANSFUSION STATUS: NORMAL
UNIT DIVISION: 0
UNIT DIVISION: 0
UNIT ISSUE DATE/TIME: NORMAL
UNIT ISSUE DATE/TIME: NORMAL
VIT B12 SERPL-MCNC: 266 PG/ML (ref 211–946)
WBC OTHER # BLD: 11.8 K/UL (ref 4.5–11.5)

## 2025-04-03 PROCEDURE — 83540 ASSAY OF IRON: CPT

## 2025-04-03 PROCEDURE — 6370000000 HC RX 637 (ALT 250 FOR IP): Performed by: NURSE PRACTITIONER

## 2025-04-03 PROCEDURE — 97165 OT EVAL LOW COMPLEX 30 MIN: CPT

## 2025-04-03 PROCEDURE — 80053 COMPREHEN METABOLIC PANEL: CPT

## 2025-04-03 PROCEDURE — 83550 IRON BINDING TEST: CPT

## 2025-04-03 PROCEDURE — 97161 PT EVAL LOW COMPLEX 20 MIN: CPT

## 2025-04-03 PROCEDURE — 2500000003 HC RX 250 WO HCPCS: Performed by: INTERNAL MEDICINE

## 2025-04-03 PROCEDURE — 85025 COMPLETE CBC W/AUTO DIFF WBC: CPT

## 2025-04-03 PROCEDURE — 82728 ASSAY OF FERRITIN: CPT

## 2025-04-03 PROCEDURE — 6360000002 HC RX W HCPCS: Performed by: INTERNAL MEDICINE

## 2025-04-03 PROCEDURE — 82746 ASSAY OF FOLIC ACID SERUM: CPT

## 2025-04-03 PROCEDURE — 2700000000 HC OXYGEN THERAPY PER DAY

## 2025-04-03 PROCEDURE — 6370000000 HC RX 637 (ALT 250 FOR IP): Performed by: INTERNAL MEDICINE

## 2025-04-03 PROCEDURE — 94640 AIRWAY INHALATION TREATMENT: CPT

## 2025-04-03 PROCEDURE — 82607 VITAMIN B-12: CPT

## 2025-04-03 PROCEDURE — 2060000000 HC ICU INTERMEDIATE R&B

## 2025-04-03 RX ORDER — HYDROCORTISONE ACETATE, PRAMOXINE HCL 2.5; 1 G/100G; G/100G
CREAM TOPICAL 3 TIMES DAILY
Status: DISCONTINUED | OUTPATIENT
Start: 2025-04-03 | End: 2025-04-09 | Stop reason: HOSPADM

## 2025-04-03 RX ORDER — ARFORMOTEROL TARTRATE 15 UG/2ML
15 SOLUTION RESPIRATORY (INHALATION)
Status: DISCONTINUED | OUTPATIENT
Start: 2025-04-03 | End: 2025-04-09 | Stop reason: HOSPADM

## 2025-04-03 RX ORDER — GUAIFENESIN/DEXTROMETHORPHAN 100-10MG/5
10 SYRUP ORAL EVERY 6 HOURS PRN
Status: DISCONTINUED | OUTPATIENT
Start: 2025-04-03 | End: 2025-04-09 | Stop reason: HOSPADM

## 2025-04-03 RX ORDER — IPRATROPIUM BROMIDE AND ALBUTEROL SULFATE 2.5; .5 MG/3ML; MG/3ML
1 SOLUTION RESPIRATORY (INHALATION)
Status: DISCONTINUED | OUTPATIENT
Start: 2025-04-03 | End: 2025-04-09 | Stop reason: HOSPADM

## 2025-04-03 RX ORDER — BISACODYL 10 MG
10 SUPPOSITORY, RECTAL RECTAL ONCE
Status: DISCONTINUED | OUTPATIENT
Start: 2025-04-03 | End: 2025-04-09 | Stop reason: HOSPADM

## 2025-04-03 RX ORDER — GUAIFENESIN 400 MG/1
400 TABLET ORAL 3 TIMES DAILY
Status: DISCONTINUED | OUTPATIENT
Start: 2025-04-03 | End: 2025-04-09 | Stop reason: HOSPADM

## 2025-04-03 RX ORDER — DOCUSATE SODIUM 100 MG/1
100 CAPSULE, LIQUID FILLED ORAL 2 TIMES DAILY
Status: DISCONTINUED | OUTPATIENT
Start: 2025-04-03 | End: 2025-04-09 | Stop reason: HOSPADM

## 2025-04-03 RX ADMIN — HYDROCODONE BITARTRATE AND ACETAMINOPHEN 1 TABLET: 5; 325 TABLET ORAL at 09:32

## 2025-04-03 RX ADMIN — DOCUSATE SODIUM 100 MG: 100 CAPSULE, LIQUID FILLED ORAL at 13:51

## 2025-04-03 RX ADMIN — FUROSEMIDE 20 MG: 20 TABLET ORAL at 09:24

## 2025-04-03 RX ADMIN — HYDROCODONE BITARTRATE AND ACETAMINOPHEN 1 TABLET: 5; 325 TABLET ORAL at 19:35

## 2025-04-03 RX ADMIN — IPRATROPIUM BROMIDE AND ALBUTEROL SULFATE 1 DOSE: 2.5; .5 SOLUTION RESPIRATORY (INHALATION) at 13:09

## 2025-04-03 RX ADMIN — BUDESONIDE 1000 MCG: 0.5 SUSPENSION RESPIRATORY (INHALATION) at 20:07

## 2025-04-03 RX ADMIN — METHYLPREDNISOLONE SODIUM SUCCINATE 40 MG: 40 INJECTION, POWDER, FOR SOLUTION INTRAMUSCULAR; INTRAVENOUS at 17:37

## 2025-04-03 RX ADMIN — HYDROCORTISONE ACETATE, PRAMOXINE HCL: 2.5; 1 CREAM TOPICAL at 13:51

## 2025-04-03 RX ADMIN — GUAIFENESIN 400 MG: 400 TABLET ORAL at 13:51

## 2025-04-03 RX ADMIN — METOPROLOL TARTRATE 12.5 MG: 25 TABLET, FILM COATED ORAL at 19:29

## 2025-04-03 RX ADMIN — ARFORMOTEROL TARTRATE 15 MCG: 15 SOLUTION RESPIRATORY (INHALATION) at 09:17

## 2025-04-03 RX ADMIN — EZETIMIBE 10 MG: 10 TABLET ORAL at 09:24

## 2025-04-03 RX ADMIN — SODIUM CHLORIDE, PRESERVATIVE FREE 10 ML: 5 INJECTION INTRAVENOUS at 09:25

## 2025-04-03 RX ADMIN — MIRTAZAPINE 15 MG: 15 TABLET, FILM COATED ORAL at 19:29

## 2025-04-03 RX ADMIN — BUDESONIDE 1000 MCG: 0.5 SUSPENSION RESPIRATORY (INHALATION) at 09:17

## 2025-04-03 RX ADMIN — CLONAZEPAM 1 MG: 0.5 TABLET ORAL at 09:33

## 2025-04-03 RX ADMIN — ALBUTEROL SULFATE 2.5 MG: 2.5 SOLUTION RESPIRATORY (INHALATION) at 09:18

## 2025-04-03 RX ADMIN — METOPROLOL TARTRATE 12.5 MG: 25 TABLET, FILM COATED ORAL at 09:24

## 2025-04-03 RX ADMIN — LISINOPRIL 20 MG: 20 TABLET ORAL at 09:24

## 2025-04-03 RX ADMIN — GUAIFENESIN 400 MG: 400 TABLET ORAL at 19:29

## 2025-04-03 RX ADMIN — DOCUSATE SODIUM 100 MG: 100 CAPSULE, LIQUID FILLED ORAL at 19:29

## 2025-04-03 RX ADMIN — HYDROCORTISONE ACETATE, PRAMOXINE HCL: 2.5; 1 CREAM TOPICAL at 19:31

## 2025-04-03 RX ADMIN — ARFORMOTEROL TARTRATE 15 MCG: 15 SOLUTION RESPIRATORY (INHALATION) at 20:07

## 2025-04-03 RX ADMIN — IPRATROPIUM BROMIDE AND ALBUTEROL SULFATE 1 DOSE: 2.5; .5 SOLUTION RESPIRATORY (INHALATION) at 20:07

## 2025-04-03 RX ADMIN — DESMOPRESSIN ACETATE 40 MG: 0.2 TABLET ORAL at 09:24

## 2025-04-03 RX ADMIN — GUAIFENESIN SYRUP AND DEXTROMETHORPHAN 10 ML: 100; 10 SYRUP ORAL at 11:15

## 2025-04-03 RX ADMIN — CLONAZEPAM 1 MG: 0.5 TABLET ORAL at 19:35

## 2025-04-03 RX ADMIN — PANTOPRAZOLE SODIUM 40 MG: 40 INJECTION, POWDER, FOR SOLUTION INTRAVENOUS at 19:27

## 2025-04-03 RX ADMIN — SODIUM CHLORIDE, PRESERVATIVE FREE 10 ML: 5 INJECTION INTRAVENOUS at 19:27

## 2025-04-03 RX ADMIN — METHYLPREDNISOLONE SODIUM SUCCINATE 40 MG: 40 INJECTION, POWDER, FOR SOLUTION INTRAMUSCULAR; INTRAVENOUS at 09:25

## 2025-04-03 RX ADMIN — IPRATROPIUM BROMIDE AND ALBUTEROL SULFATE 1 DOSE: 2.5; .5 SOLUTION RESPIRATORY (INHALATION) at 16:21

## 2025-04-03 RX ADMIN — PANTOPRAZOLE SODIUM 40 MG: 40 INJECTION, POWDER, FOR SOLUTION INTRAVENOUS at 09:25

## 2025-04-03 ASSESSMENT — PAIN DESCRIPTION - DESCRIPTORS
DESCRIPTORS: CRUSHING;DISCOMFORT
DESCRIPTORS: ACHING;THROBBING

## 2025-04-03 ASSESSMENT — PAIN SCALES - GENERAL
PAINLEVEL_OUTOF10: 8
PAINLEVEL_OUTOF10: 8
PAINLEVEL_OUTOF10: 2

## 2025-04-03 ASSESSMENT — PAIN DESCRIPTION - LOCATION
LOCATION: BACK
LOCATION: LEG;BACK

## 2025-04-03 ASSESSMENT — PAIN DESCRIPTION - ORIENTATION
ORIENTATION: LOWER
ORIENTATION: RIGHT;LEFT;LOWER;MID

## 2025-04-03 ASSESSMENT — PAIN - FUNCTIONAL ASSESSMENT: PAIN_FUNCTIONAL_ASSESSMENT: ACTIVITIES ARE NOT PREVENTED

## 2025-04-03 NOTE — ACP (ADVANCE CARE PLANNING)
Advance Care Planning   Healthcare Decision Maker:    Primary Decision Maker: Zoila Acosta - Peng - [phone]    Click here to complete Healthcare Decision Makers including selection of the Healthcare Decision Maker Relationship (ie \"Primary\").  Today we documented Decision Maker(s) consistent with Legal Next of Kin hierarchy.

## 2025-04-03 NOTE — CONSULTS
at the bedside, reviewing labs and radiographs, reviewing notes and consultations, discussing with staff and family was more than 55 minutes.    Thanks for letting us see this patient in consultation.  Please contact us with any questions. Office [phone] or after hours through [facility], x [phone].    
12.4 today, was 5.9 on admission.  Likely an inaccurate initial draw  + Occult  LLQ abdominal pain  Constipation  COPD exacerbation with human metapneumovirus +  CVA, aspirin Plavix on hold  HX of colon polyps   H of rectal/colon cancer    RECOMMENDATIONS:      Repeat occult x1  Trend labs  Diet as tolerated  Monitor H&H  Continue Protonix as ordered  Monitor stools and document  Will need repeat colonoscopy due to history of polyps 2016,  and family history.  Can complete both at the same time.  Likely as outpatient pending clinical course  Colace 100 mg twice daily  Analpram cream as ordered  Dulcolax suppository x 1 today  Medical management per primary team  Will follow    Note: This report was completed utilizing computer voice recognition software. Every effort has been made to ensure accuracy, however; inadvertent computerized transcription errors may be present.     Thank you very much for your consultation. We will follow closely with you.    Discussed with Dr. Girer  Treatment plan developed by Dr. Marvel KITCHEN, NP-C 4/3/2025 9 AM for Dr. Grier      GI Attending Addendum:  The patient was seen and examined independently from the midlevel team. The case was discuss with the team and the above assessment and plan was developed.  Jerome Grier, DO

## 2025-04-03 NOTE — CARE COORDINATION
Patient is in Contact isolation for Human meta pneumovirus. Introduced my self and provided explanation of CM role to patient. Admitted for anemia requiring transfusion. S/p transfusion Hgb 5.9 > 12.4. Pulmonology is following.  GI consult pending, await plan. Currently on  nebs, IV solumedrol, PO lasix, at baseline 3L O2.  Patient is from [facility], discharge plan is to return when medically stable. Patient is a Medicaid bed hold, no auth required to return. JULIA initiated, envelope with demos and transport form in chart. Will continue to follow.  Holly MAYSN, RN  Case Management

## 2025-04-03 NOTE — H&P
Internal Medicine History & Physical     Name: Barbara Hutson  : 1945  Chief Complaint: Shortness of Breath and Dizziness  Primary Care Physician: Ananth Huff MD  Admission date: 2025  Date of service: 4/3/2025     History of Present Illness  Barbara is a 79 y.o. year old female with a past medical history of hypertension, hyperlipidemia, COPD, anxiety/depression, vascular dementia and CVA.  She presented to the ER from the SNF with complaints of cough, chest congestion and shortness of breath that began a few days prior to arrival.  She reports that over the last few days she has had a cough and chest congestion, having trouble seeing up sputum.  She reports shortness of breath that worsens with exertion, oxygen levels low at the SNF and had to be increased to 4L NC.  She denies any fever or chills.  Denies any nausea or vomiting.  She has been using breathing treatments at the facility without any improvement.  Her symptoms have been constant and moderate in severity, worsened with activity but nothing in particular seems to make it better.  Given the lack of improvement she was sent to the ER for further evaluation.  Upon arrival to the ER she was noted to be hemodynamically stable.  Lab work was obtained and noted a potassium 3.4, BUN 22, creatinine 1.2, WBC 8.5 and hemoglobin 5.9 down from her baseline of 12.  Stool for occult blood was obtained in the ER and found to be positive.  Viral respiratory panel was also obtained and found to be positive for human metapneumovirus.  She was given bronchodilators, IV steroids and 2 units PRBC and admitted for further management.    Currently she is seen lying in bed awake and alert no distress.  She just returned from the bathroom.  She does continue to report increased shortness of breath with exertion as well as a cough and chest congestion, does not really feel much better.  She denies any fever or chills.  Denies any nausea or vomiting.  In

## 2025-04-03 NOTE — DISCHARGE INSTR - COC
all that are sent with patient):  ***    RN SIGNATURE:  Electronically signed by Rebeca Matias RN on 4/9/25 at 11:29 AM EDT    CASE MANAGEMENT/SOCIAL WORK SECTION    Inpatient Status Date: 4/2/25    Readmission Risk Assessment Score:  [facility] RISK OF UNPLANNED READMISSION 2.0             18.1 Total Score        Discharging to Facility/ Agency   Name: [facility]  Address   [address] [place] [address]             Contact Information    [phone]          Dialysis Facility (if applicable)   Name:  Address:  Dialysis Schedule:  Phone:  Fax:    / signature: Electronically signed by Holly Brown on 4/3/25 at 11:49 AM EDT    PHYSICIAN SECTION    Prognosis: Fair    Condition at Discharge: Stable    Rehab Potential (if transferring to Rehab): Fair    Recommended Labs or Other Treatments After Discharge: none    Physician Certification: I certify the above information and transfer of Barbara Hutson  is necessary for the continuing treatment of the diagnosis listed and that she requires Skilled Nursing Facility for less 30 days.     Update Admission H&P: Changes in H&P as follows - see DC summary    PHYSICIAN SIGNATURE:  Electronically signed by Vicente Taylor DO on 4/8/25 at 9:31 AM EDT

## 2025-04-04 LAB
ALBUMIN SERPL-MCNC: 3.1 G/DL (ref 3.5–5.2)
ALP SERPL-CCNC: 84 U/L (ref 35–104)
ALT SERPL-CCNC: 18 U/L (ref 0–32)
ANION GAP SERPL CALCULATED.3IONS-SCNC: 11 MMOL/L (ref 7–16)
AST SERPL-CCNC: 22 U/L (ref 0–31)
BASOPHILS # BLD: 0.01 K/UL (ref 0–0.2)
BASOPHILS NFR BLD: 0 % (ref 0–2)
BILIRUB SERPL-MCNC: 0.5 MG/DL (ref 0–1.2)
BUN SERPL-MCNC: 34 MG/DL (ref 6–23)
CALCIUM SERPL-MCNC: 9.1 MG/DL (ref 8.6–10.2)
CHLORIDE SERPL-SCNC: 100 MMOL/L (ref 98–107)
CO2 SERPL-SCNC: 27 MMOL/L (ref 22–29)
CREAT SERPL-MCNC: 1.2 MG/DL (ref 0.5–1)
EOSINOPHIL # BLD: 0 K/UL (ref 0.05–0.5)
EOSINOPHILS RELATIVE PERCENT: 0 % (ref 0–6)
ERYTHROCYTE [DISTWIDTH] IN BLOOD BY AUTOMATED COUNT: 13 % (ref 11.5–15)
GFR, ESTIMATED: 49 ML/MIN/1.73M2
GLUCOSE SERPL-MCNC: 175 MG/DL (ref 74–99)
HCT VFR BLD AUTO: 36.5 % (ref 34–48)
HGB BLD-MCNC: 11.8 G/DL (ref 11.5–15.5)
IMM GRANULOCYTES # BLD AUTO: 0.17 K/UL (ref 0–0.58)
IMM GRANULOCYTES NFR BLD: 1 % (ref 0–5)
LYMPHOCYTES NFR BLD: 0.66 K/UL (ref 1.5–4)
LYMPHOCYTES RELATIVE PERCENT: 4 % (ref 20–42)
MCH RBC QN AUTO: 31.1 PG (ref 26–35)
MCHC RBC AUTO-ENTMCNC: 32.3 G/DL (ref 32–34.5)
MCV RBC AUTO: 96.1 FL (ref 80–99.9)
MONOCYTES NFR BLD: 0.44 K/UL (ref 0.1–0.95)
MONOCYTES NFR BLD: 3 % (ref 2–12)
NEUTROPHILS NFR BLD: 92 % (ref 43–80)
NEUTS SEG NFR BLD: 15.78 K/UL (ref 1.8–7.3)
PLATELET # BLD AUTO: 284 K/UL (ref 130–450)
PMV BLD AUTO: 10.2 FL (ref 7–12)
POTASSIUM SERPL-SCNC: 4.4 MMOL/L (ref 3.5–5)
PROT SERPL-MCNC: 6.2 G/DL (ref 6.4–8.3)
RBC # BLD AUTO: 3.8 M/UL (ref 3.5–5.5)
SODIUM SERPL-SCNC: 138 MMOL/L (ref 132–146)
WBC OTHER # BLD: 17.1 K/UL (ref 4.5–11.5)

## 2025-04-04 PROCEDURE — 6370000000 HC RX 637 (ALT 250 FOR IP): Performed by: INTERNAL MEDICINE

## 2025-04-04 PROCEDURE — 6370000000 HC RX 637 (ALT 250 FOR IP): Performed by: NURSE PRACTITIONER

## 2025-04-04 PROCEDURE — 80053 COMPREHEN METABOLIC PANEL: CPT

## 2025-04-04 PROCEDURE — 6360000002 HC RX W HCPCS: Performed by: INTERNAL MEDICINE

## 2025-04-04 PROCEDURE — 2060000000 HC ICU INTERMEDIATE R&B

## 2025-04-04 PROCEDURE — 6370000000 HC RX 637 (ALT 250 FOR IP): Performed by: HOSPITALIST

## 2025-04-04 PROCEDURE — 94640 AIRWAY INHALATION TREATMENT: CPT

## 2025-04-04 PROCEDURE — 2700000000 HC OXYGEN THERAPY PER DAY

## 2025-04-04 PROCEDURE — 85025 COMPLETE CBC W/AUTO DIFF WBC: CPT

## 2025-04-04 PROCEDURE — 2500000003 HC RX 250 WO HCPCS: Performed by: INTERNAL MEDICINE

## 2025-04-04 RX ORDER — CLONAZEPAM 0.5 MG/1
0.5 TABLET ORAL EVERY 12 HOURS PRN
Status: DISCONTINUED | OUTPATIENT
Start: 2025-04-04 | End: 2025-04-09 | Stop reason: HOSPADM

## 2025-04-04 RX ORDER — PANTOPRAZOLE SODIUM 40 MG/1
40 TABLET, DELAYED RELEASE ORAL
Status: DISCONTINUED | OUTPATIENT
Start: 2025-04-04 | End: 2025-04-09 | Stop reason: HOSPADM

## 2025-04-04 RX ADMIN — DOCUSATE SODIUM 100 MG: 100 CAPSULE, LIQUID FILLED ORAL at 20:14

## 2025-04-04 RX ADMIN — ARFORMOTEROL TARTRATE 15 MCG: 15 SOLUTION RESPIRATORY (INHALATION) at 19:26

## 2025-04-04 RX ADMIN — GUAIFENESIN 400 MG: 400 TABLET ORAL at 20:12

## 2025-04-04 RX ADMIN — SODIUM CHLORIDE, PRESERVATIVE FREE 10 ML: 5 INJECTION INTRAVENOUS at 07:36

## 2025-04-04 RX ADMIN — BUDESONIDE 1000 MCG: 0.5 SUSPENSION RESPIRATORY (INHALATION) at 08:43

## 2025-04-04 RX ADMIN — IPRATROPIUM BROMIDE AND ALBUTEROL SULFATE 1 DOSE: 2.5; .5 SOLUTION RESPIRATORY (INHALATION) at 08:43

## 2025-04-04 RX ADMIN — IPRATROPIUM BROMIDE AND ALBUTEROL SULFATE 1 DOSE: 2.5; .5 SOLUTION RESPIRATORY (INHALATION) at 12:40

## 2025-04-04 RX ADMIN — LISINOPRIL 20 MG: 20 TABLET ORAL at 07:36

## 2025-04-04 RX ADMIN — CLOPIDOGREL BISULFATE 75 MG: 75 TABLET, FILM COATED ORAL at 16:04

## 2025-04-04 RX ADMIN — METOPROLOL TARTRATE 12.5 MG: 25 TABLET, FILM COATED ORAL at 20:12

## 2025-04-04 RX ADMIN — DOCUSATE SODIUM 100 MG: 100 CAPSULE, LIQUID FILLED ORAL at 07:36

## 2025-04-04 RX ADMIN — METHYLPREDNISOLONE SODIUM SUCCINATE 40 MG: 40 INJECTION, POWDER, FOR SOLUTION INTRAMUSCULAR; INTRAVENOUS at 16:04

## 2025-04-04 RX ADMIN — BUDESONIDE 1000 MCG: 0.5 SUSPENSION RESPIRATORY (INHALATION) at 19:26

## 2025-04-04 RX ADMIN — HYDROCORTISONE ACETATE, PRAMOXINE HCL: 2.5; 1 CREAM TOPICAL at 20:14

## 2025-04-04 RX ADMIN — CLONAZEPAM 0.5 MG: 0.5 TABLET ORAL at 20:22

## 2025-04-04 RX ADMIN — GUAIFENESIN SYRUP AND DEXTROMETHORPHAN 10 ML: 100; 10 SYRUP ORAL at 12:57

## 2025-04-04 RX ADMIN — CLONAZEPAM 1 MG: 0.5 TABLET ORAL at 08:08

## 2025-04-04 RX ADMIN — ARFORMOTEROL TARTRATE 15 MCG: 15 SOLUTION RESPIRATORY (INHALATION) at 08:43

## 2025-04-04 RX ADMIN — ASPIRIN 81 MG: 81 TABLET, COATED ORAL at 16:04

## 2025-04-04 RX ADMIN — HYDROCORTISONE ACETATE, PRAMOXINE HCL: 2.5; 1 CREAM TOPICAL at 07:37

## 2025-04-04 RX ADMIN — GUAIFENESIN 400 MG: 400 TABLET ORAL at 07:36

## 2025-04-04 RX ADMIN — METHYLPREDNISOLONE SODIUM SUCCINATE 40 MG: 40 INJECTION, POWDER, FOR SOLUTION INTRAMUSCULAR; INTRAVENOUS at 01:20

## 2025-04-04 RX ADMIN — HYDROCODONE BITARTRATE AND ACETAMINOPHEN 1 TABLET: 5; 325 TABLET ORAL at 08:08

## 2025-04-04 RX ADMIN — MIRTAZAPINE 15 MG: 15 TABLET, FILM COATED ORAL at 20:22

## 2025-04-04 RX ADMIN — FUROSEMIDE 20 MG: 20 TABLET ORAL at 07:36

## 2025-04-04 RX ADMIN — EZETIMIBE 10 MG: 10 TABLET ORAL at 07:37

## 2025-04-04 RX ADMIN — GUAIFENESIN 400 MG: 400 TABLET ORAL at 16:04

## 2025-04-04 RX ADMIN — PANTOPRAZOLE SODIUM 40 MG: 40 INJECTION, POWDER, FOR SOLUTION INTRAVENOUS at 07:36

## 2025-04-04 RX ADMIN — METHYLPREDNISOLONE SODIUM SUCCINATE 40 MG: 40 INJECTION, POWDER, FOR SOLUTION INTRAMUSCULAR; INTRAVENOUS at 07:36

## 2025-04-04 RX ADMIN — IPRATROPIUM BROMIDE AND ALBUTEROL SULFATE 1 DOSE: 2.5; .5 SOLUTION RESPIRATORY (INHALATION) at 19:26

## 2025-04-04 RX ADMIN — IPRATROPIUM BROMIDE AND ALBUTEROL SULFATE 1 DOSE: 2.5; .5 SOLUTION RESPIRATORY (INHALATION) at 15:31

## 2025-04-04 RX ADMIN — METOPROLOL TARTRATE 12.5 MG: 25 TABLET, FILM COATED ORAL at 07:36

## 2025-04-04 RX ADMIN — DESMOPRESSIN ACETATE 40 MG: 0.2 TABLET ORAL at 07:36

## 2025-04-04 RX ADMIN — HYDROCORTISONE ACETATE, PRAMOXINE HCL: 2.5; 1 CREAM TOPICAL at 13:00

## 2025-04-04 RX ADMIN — HYDROCODONE BITARTRATE AND ACETAMINOPHEN 1 TABLET: 5; 325 TABLET ORAL at 16:03

## 2025-04-04 RX ADMIN — SODIUM CHLORIDE, PRESERVATIVE FREE 10 ML: 5 INJECTION INTRAVENOUS at 20:14

## 2025-04-04 ASSESSMENT — PAIN SCALES - GENERAL
PAINLEVEL_OUTOF10: 8
PAINLEVEL_OUTOF10: 9

## 2025-04-04 NOTE — CARE COORDINATION
Discharge plan is to return to [facility] when medically stable. Patient is a Medicaid bed hold, no auth required to return. JULIA initiated, envelope with demos and transport form in chart. Will continue to follow.  Holly MAYSN, RN  Case Management

## 2025-04-04 NOTE — PLAN OF CARE
Problem: Safety - Adult  Goal: Free from fall injury  4/4/2025 0006 by Monalisa Cavazos, RN  Outcome: Progressing  Flowsheets (Taken 4/4/2025 0006)  Free From Fall Injury: Instruct family/caregiver on patient safety

## 2025-04-05 ENCOUNTER — APPOINTMENT (OUTPATIENT)
Dept: GENERAL RADIOLOGY | Age: 80
DRG: 140 | End: 2025-04-05
Payer: MEDICAID

## 2025-04-05 LAB
ALBUMIN SERPL-MCNC: 3 G/DL (ref 3.5–5.2)
ALP SERPL-CCNC: 80 U/L (ref 35–104)
ALT SERPL-CCNC: 21 U/L (ref 0–32)
ANION GAP SERPL CALCULATED.3IONS-SCNC: 11 MMOL/L (ref 7–16)
AST SERPL-CCNC: 18 U/L (ref 0–31)
BASOPHILS # BLD: 0.02 K/UL (ref 0–0.2)
BASOPHILS NFR BLD: 0 % (ref 0–2)
BILIRUB SERPL-MCNC: 0.4 MG/DL (ref 0–1.2)
BUN SERPL-MCNC: 36 MG/DL (ref 6–23)
CALCIUM SERPL-MCNC: 9.4 MG/DL (ref 8.6–10.2)
CHLORIDE SERPL-SCNC: 99 MMOL/L (ref 98–107)
CO2 SERPL-SCNC: 27 MMOL/L (ref 22–29)
CREAT SERPL-MCNC: 1.1 MG/DL (ref 0.5–1)
EOSINOPHIL # BLD: 0 K/UL (ref 0.05–0.5)
EOSINOPHILS RELATIVE PERCENT: 0 % (ref 0–6)
ERYTHROCYTE [DISTWIDTH] IN BLOOD BY AUTOMATED COUNT: 13.1 % (ref 11.5–15)
GFR, ESTIMATED: 54 ML/MIN/1.73M2
GLUCOSE SERPL-MCNC: 160 MG/DL (ref 74–99)
HCT VFR BLD AUTO: 37.8 % (ref 34–48)
HGB BLD-MCNC: 12.4 G/DL (ref 11.5–15.5)
IMM GRANULOCYTES # BLD AUTO: 0.25 K/UL (ref 0–0.58)
IMM GRANULOCYTES NFR BLD: 2 % (ref 0–5)
LYMPHOCYTES NFR BLD: 0.8 K/UL (ref 1.5–4)
LYMPHOCYTES RELATIVE PERCENT: 5 % (ref 20–42)
MCH RBC QN AUTO: 31.1 PG (ref 26–35)
MCHC RBC AUTO-ENTMCNC: 32.8 G/DL (ref 32–34.5)
MCV RBC AUTO: 94.7 FL (ref 80–99.9)
MONOCYTES NFR BLD: 0.68 K/UL (ref 0.1–0.95)
MONOCYTES NFR BLD: 4 % (ref 2–12)
NEUTROPHILS NFR BLD: 89 % (ref 43–80)
NEUTS SEG NFR BLD: 14.31 K/UL (ref 1.8–7.3)
PLATELET # BLD AUTO: 323 K/UL (ref 130–450)
PMV BLD AUTO: 9.8 FL (ref 7–12)
POTASSIUM SERPL-SCNC: 4.6 MMOL/L (ref 3.5–5)
PROT SERPL-MCNC: 5.9 G/DL (ref 6.4–8.3)
RBC # BLD AUTO: 3.99 M/UL (ref 3.5–5.5)
SODIUM SERPL-SCNC: 137 MMOL/L (ref 132–146)
WBC OTHER # BLD: 16.1 K/UL (ref 4.5–11.5)

## 2025-04-05 PROCEDURE — 80053 COMPREHEN METABOLIC PANEL: CPT

## 2025-04-05 PROCEDURE — 2060000000 HC ICU INTERMEDIATE R&B

## 2025-04-05 PROCEDURE — 74018 RADEX ABDOMEN 1 VIEW: CPT

## 2025-04-05 PROCEDURE — 6370000000 HC RX 637 (ALT 250 FOR IP): Performed by: NURSE PRACTITIONER

## 2025-04-05 PROCEDURE — 2700000000 HC OXYGEN THERAPY PER DAY

## 2025-04-05 PROCEDURE — 6360000002 HC RX W HCPCS: Performed by: INTERNAL MEDICINE

## 2025-04-05 PROCEDURE — 94669 MECHANICAL CHEST WALL OSCILL: CPT

## 2025-04-05 PROCEDURE — 2500000003 HC RX 250 WO HCPCS: Performed by: INTERNAL MEDICINE

## 2025-04-05 PROCEDURE — 6370000000 HC RX 637 (ALT 250 FOR IP): Performed by: INTERNAL MEDICINE

## 2025-04-05 PROCEDURE — 6370000000 HC RX 637 (ALT 250 FOR IP): Performed by: HOSPITALIST

## 2025-04-05 PROCEDURE — 85025 COMPLETE CBC W/AUTO DIFF WBC: CPT

## 2025-04-05 PROCEDURE — 94640 AIRWAY INHALATION TREATMENT: CPT

## 2025-04-05 RX ORDER — POLYETHYLENE GLYCOL 3350 17 G/17G
17 POWDER, FOR SOLUTION ORAL DAILY
Status: DISCONTINUED | OUTPATIENT
Start: 2025-04-06 | End: 2025-04-06

## 2025-04-05 RX ORDER — BISACODYL 10 MG
10 SUPPOSITORY, RECTAL RECTAL ONCE
Status: COMPLETED | OUTPATIENT
Start: 2025-04-05 | End: 2025-04-05

## 2025-04-05 RX ADMIN — METHYLPREDNISOLONE SODIUM SUCCINATE 40 MG: 40 INJECTION, POWDER, FOR SOLUTION INTRAMUSCULAR; INTRAVENOUS at 02:25

## 2025-04-05 RX ADMIN — IPRATROPIUM BROMIDE AND ALBUTEROL SULFATE 1 DOSE: 2.5; .5 SOLUTION RESPIRATORY (INHALATION) at 12:37

## 2025-04-05 RX ADMIN — Medication 3 MG: at 20:30

## 2025-04-05 RX ADMIN — METHYLPREDNISOLONE SODIUM SUCCINATE 40 MG: 40 INJECTION, POWDER, FOR SOLUTION INTRAMUSCULAR; INTRAVENOUS at 23:40

## 2025-04-05 RX ADMIN — LISINOPRIL 20 MG: 20 TABLET ORAL at 08:46

## 2025-04-05 RX ADMIN — HYDROCORTISONE ACETATE, PRAMOXINE HCL: 2.5; 1 CREAM TOPICAL at 14:26

## 2025-04-05 RX ADMIN — DESMOPRESSIN ACETATE 40 MG: 0.2 TABLET ORAL at 08:46

## 2025-04-05 RX ADMIN — CLONAZEPAM 0.5 MG: 0.5 TABLET ORAL at 20:34

## 2025-04-05 RX ADMIN — SODIUM CHLORIDE, PRESERVATIVE FREE 10 ML: 5 INJECTION INTRAVENOUS at 20:30

## 2025-04-05 RX ADMIN — GUAIFENESIN SYRUP AND DEXTROMETHORPHAN 10 ML: 100; 10 SYRUP ORAL at 04:12

## 2025-04-05 RX ADMIN — IPRATROPIUM BROMIDE AND ALBUTEROL SULFATE 1 DOSE: 2.5; .5 SOLUTION RESPIRATORY (INHALATION) at 20:18

## 2025-04-05 RX ADMIN — GUAIFENESIN 400 MG: 400 TABLET ORAL at 20:30

## 2025-04-05 RX ADMIN — METOPROLOL TARTRATE 12.5 MG: 25 TABLET, FILM COATED ORAL at 20:30

## 2025-04-05 RX ADMIN — SODIUM CHLORIDE, PRESERVATIVE FREE 10 ML: 5 INJECTION INTRAVENOUS at 08:48

## 2025-04-05 RX ADMIN — ARFORMOTEROL TARTRATE 15 MCG: 15 SOLUTION RESPIRATORY (INHALATION) at 08:21

## 2025-04-05 RX ADMIN — GUAIFENESIN 400 MG: 400 TABLET ORAL at 14:25

## 2025-04-05 RX ADMIN — ONDANSETRON 4 MG: 2 INJECTION, SOLUTION INTRAMUSCULAR; INTRAVENOUS at 06:46

## 2025-04-05 RX ADMIN — HYDROCODONE BITARTRATE AND ACETAMINOPHEN 1 TABLET: 5; 325 TABLET ORAL at 20:30

## 2025-04-05 RX ADMIN — FUROSEMIDE 20 MG: 20 TABLET ORAL at 08:45

## 2025-04-05 RX ADMIN — BISACODYL 10 MG: 10 SUPPOSITORY RECTAL at 14:25

## 2025-04-05 RX ADMIN — BUDESONIDE 1000 MCG: 0.5 SUSPENSION RESPIRATORY (INHALATION) at 08:21

## 2025-04-05 RX ADMIN — ASPIRIN 81 MG: 81 TABLET, COATED ORAL at 08:46

## 2025-04-05 RX ADMIN — IPRATROPIUM BROMIDE AND ALBUTEROL SULFATE 1 DOSE: 2.5; .5 SOLUTION RESPIRATORY (INHALATION) at 15:56

## 2025-04-05 RX ADMIN — METHYLPREDNISOLONE SODIUM SUCCINATE 40 MG: 40 INJECTION, POWDER, FOR SOLUTION INTRAMUSCULAR; INTRAVENOUS at 17:19

## 2025-04-05 RX ADMIN — GUAIFENESIN 400 MG: 400 TABLET ORAL at 08:46

## 2025-04-05 RX ADMIN — GUAIFENESIN SYRUP AND DEXTROMETHORPHAN 10 ML: 100; 10 SYRUP ORAL at 20:30

## 2025-04-05 RX ADMIN — METHYLPREDNISOLONE SODIUM SUCCINATE 40 MG: 40 INJECTION, POWDER, FOR SOLUTION INTRAMUSCULAR; INTRAVENOUS at 08:48

## 2025-04-05 RX ADMIN — METOPROLOL TARTRATE 12.5 MG: 25 TABLET, FILM COATED ORAL at 08:46

## 2025-04-05 RX ADMIN — HYDROCORTISONE ACETATE, PRAMOXINE HCL: 2.5; 1 CREAM TOPICAL at 20:35

## 2025-04-05 RX ADMIN — HYDROCORTISONE ACETATE, PRAMOXINE HCL: 2.5; 1 CREAM TOPICAL at 08:46

## 2025-04-05 RX ADMIN — BUDESONIDE 1000 MCG: 0.5 SUSPENSION RESPIRATORY (INHALATION) at 20:18

## 2025-04-05 RX ADMIN — ARFORMOTEROL TARTRATE 15 MCG: 15 SOLUTION RESPIRATORY (INHALATION) at 20:18

## 2025-04-05 RX ADMIN — IPRATROPIUM BROMIDE AND ALBUTEROL SULFATE 1 DOSE: 2.5; .5 SOLUTION RESPIRATORY (INHALATION) at 08:21

## 2025-04-05 RX ADMIN — CLONAZEPAM 0.5 MG: 0.5 TABLET ORAL at 09:32

## 2025-04-05 RX ADMIN — MIRTAZAPINE 15 MG: 15 TABLET, FILM COATED ORAL at 20:30

## 2025-04-05 RX ADMIN — DOCUSATE SODIUM 100 MG: 100 CAPSULE, LIQUID FILLED ORAL at 20:30

## 2025-04-05 RX ADMIN — EZETIMIBE 10 MG: 10 TABLET ORAL at 08:46

## 2025-04-05 RX ADMIN — HYDROCODONE BITARTRATE AND ACETAMINOPHEN 1 TABLET: 5; 325 TABLET ORAL at 08:44

## 2025-04-05 RX ADMIN — DOCUSATE SODIUM 100 MG: 100 CAPSULE, LIQUID FILLED ORAL at 08:45

## 2025-04-05 RX ADMIN — CLOPIDOGREL BISULFATE 75 MG: 75 TABLET, FILM COATED ORAL at 08:45

## 2025-04-05 ASSESSMENT — PAIN SCALES - GENERAL
PAINLEVEL_OUTOF10: 10
PAINLEVEL_OUTOF10: 7

## 2025-04-05 ASSESSMENT — PAIN DESCRIPTION - LOCATION: LOCATION: ABDOMEN

## 2025-04-05 ASSESSMENT — PAIN DESCRIPTION - ORIENTATION: ORIENTATION: MID;UPPER

## 2025-04-05 ASSESSMENT — PAIN DESCRIPTION - DESCRIPTORS: DESCRIPTORS: ACHING;SHARP;CRAMPING

## 2025-04-06 ENCOUNTER — APPOINTMENT (OUTPATIENT)
Dept: GENERAL RADIOLOGY | Age: 80
DRG: 140 | End: 2025-04-06
Payer: MEDICAID

## 2025-04-06 LAB
ALBUMIN SERPL-MCNC: 3.3 G/DL (ref 3.5–5.2)
ALP SERPL-CCNC: 84 U/L (ref 35–104)
ALT SERPL-CCNC: 24 U/L (ref 0–32)
ANION GAP SERPL CALCULATED.3IONS-SCNC: 12 MMOL/L (ref 7–16)
AST SERPL-CCNC: 18 U/L (ref 0–31)
B PARAP IS1001 DNA NPH QL NAA+NON-PROBE: NOT DETECTED
B PERT DNA SPEC QL NAA+PROBE: NOT DETECTED
BASOPHILS # BLD: 0.02 K/UL (ref 0–0.2)
BASOPHILS NFR BLD: 0 % (ref 0–2)
BILIRUB SERPL-MCNC: 0.4 MG/DL (ref 0–1.2)
BUN SERPL-MCNC: 33 MG/DL (ref 6–23)
C PNEUM DNA NPH QL NAA+NON-PROBE: NOT DETECTED
CALCIUM SERPL-MCNC: 9.9 MG/DL (ref 8.6–10.2)
CHLORIDE SERPL-SCNC: 98 MMOL/L (ref 98–107)
CO2 SERPL-SCNC: 28 MMOL/L (ref 22–29)
CREAT SERPL-MCNC: 1.1 MG/DL (ref 0.5–1)
EOSINOPHIL # BLD: 0 K/UL (ref 0.05–0.5)
EOSINOPHILS RELATIVE PERCENT: 0 % (ref 0–6)
ERYTHROCYTE [DISTWIDTH] IN BLOOD BY AUTOMATED COUNT: 12.9 % (ref 11.5–15)
FLUAV RNA NPH QL NAA+NON-PROBE: NOT DETECTED
FLUBV RNA NPH QL NAA+NON-PROBE: NOT DETECTED
GFR, ESTIMATED: 51 ML/MIN/1.73M2
GLUCOSE SERPL-MCNC: 172 MG/DL (ref 74–99)
HADV DNA NPH QL NAA+NON-PROBE: NOT DETECTED
HCOV 229E RNA NPH QL NAA+NON-PROBE: NOT DETECTED
HCOV HKU1 RNA NPH QL NAA+NON-PROBE: NOT DETECTED
HCOV NL63 RNA NPH QL NAA+NON-PROBE: NOT DETECTED
HCOV OC43 RNA NPH QL NAA+NON-PROBE: NOT DETECTED
HCT VFR BLD AUTO: 39.2 % (ref 34–48)
HGB BLD-MCNC: 12.9 G/DL (ref 11.5–15.5)
HMPV RNA NPH QL NAA+NON-PROBE: DETECTED
HPIV1 RNA NPH QL NAA+NON-PROBE: NOT DETECTED
HPIV2 RNA NPH QL NAA+NON-PROBE: NOT DETECTED
HPIV3 RNA NPH QL NAA+NON-PROBE: NOT DETECTED
HPIV4 RNA NPH QL NAA+NON-PROBE: NOT DETECTED
IMM GRANULOCYTES # BLD AUTO: 0.39 K/UL (ref 0–0.58)
IMM GRANULOCYTES NFR BLD: 3 % (ref 0–5)
LYMPHOCYTES NFR BLD: 0.75 K/UL (ref 1.5–4)
LYMPHOCYTES RELATIVE PERCENT: 6 % (ref 20–42)
M PNEUMO DNA NPH QL NAA+NON-PROBE: NOT DETECTED
MCH RBC QN AUTO: 31 PG (ref 26–35)
MCHC RBC AUTO-ENTMCNC: 32.9 G/DL (ref 32–34.5)
MCV RBC AUTO: 94.2 FL (ref 80–99.9)
MONOCYTES NFR BLD: 0.45 K/UL (ref 0.1–0.95)
MONOCYTES NFR BLD: 4 % (ref 2–12)
NEUTROPHILS NFR BLD: 88 % (ref 43–80)
NEUTS SEG NFR BLD: 11.25 K/UL (ref 1.8–7.3)
PLATELET # BLD AUTO: 363 K/UL (ref 130–450)
PMV BLD AUTO: 9.8 FL (ref 7–12)
POTASSIUM SERPL-SCNC: 4.8 MMOL/L (ref 3.5–5)
PROT SERPL-MCNC: 6.2 G/DL (ref 6.4–8.3)
RBC # BLD AUTO: 4.16 M/UL (ref 3.5–5.5)
RSV RNA NPH QL NAA+NON-PROBE: NOT DETECTED
RV+EV RNA NPH QL NAA+NON-PROBE: NOT DETECTED
SARS-COV-2 RNA NPH QL NAA+NON-PROBE: NOT DETECTED
SODIUM SERPL-SCNC: 138 MMOL/L (ref 132–146)
SPECIMEN DESCRIPTION: ABNORMAL
WBC OTHER # BLD: 12.9 K/UL (ref 4.5–11.5)

## 2025-04-06 PROCEDURE — 94669 MECHANICAL CHEST WALL OSCILL: CPT

## 2025-04-06 PROCEDURE — 6370000000 HC RX 637 (ALT 250 FOR IP): Performed by: INTERNAL MEDICINE

## 2025-04-06 PROCEDURE — 6370000000 HC RX 637 (ALT 250 FOR IP): Performed by: NURSE PRACTITIONER

## 2025-04-06 PROCEDURE — 85025 COMPLETE CBC W/AUTO DIFF WBC: CPT

## 2025-04-06 PROCEDURE — 2500000003 HC RX 250 WO HCPCS: Performed by: INTERNAL MEDICINE

## 2025-04-06 PROCEDURE — 71045 X-RAY EXAM CHEST 1 VIEW: CPT

## 2025-04-06 PROCEDURE — 6360000002 HC RX W HCPCS: Performed by: INTERNAL MEDICINE

## 2025-04-06 PROCEDURE — 6370000000 HC RX 637 (ALT 250 FOR IP): Performed by: HOSPITALIST

## 2025-04-06 PROCEDURE — 80053 COMPREHEN METABOLIC PANEL: CPT

## 2025-04-06 PROCEDURE — 94640 AIRWAY INHALATION TREATMENT: CPT

## 2025-04-06 PROCEDURE — 2060000000 HC ICU INTERMEDIATE R&B

## 2025-04-06 PROCEDURE — 6370000000 HC RX 637 (ALT 250 FOR IP): Performed by: PHYSICIAN ASSISTANT

## 2025-04-06 PROCEDURE — 2700000000 HC OXYGEN THERAPY PER DAY

## 2025-04-06 PROCEDURE — 0202U NFCT DS 22 TRGT SARS-COV-2: CPT

## 2025-04-06 RX ORDER — LACTULOSE 10 G/15ML
20 SOLUTION ORAL 2 TIMES DAILY
Status: DISCONTINUED | OUTPATIENT
Start: 2025-04-06 | End: 2025-04-07

## 2025-04-06 RX ORDER — METHYLPREDNISOLONE SODIUM SUCCINATE 125 MG/2ML
60 INJECTION INTRAMUSCULAR; INTRAVENOUS EVERY 8 HOURS
Status: DISCONTINUED | OUTPATIENT
Start: 2025-04-06 | End: 2025-04-09

## 2025-04-06 RX ADMIN — METHYLPREDNISOLONE SODIUM SUCCINATE 60 MG: 125 INJECTION INTRAMUSCULAR; INTRAVENOUS at 23:31

## 2025-04-06 RX ADMIN — LACTULOSE 20 G: 20 SOLUTION ORAL at 08:42

## 2025-04-06 RX ADMIN — BUDESONIDE 1000 MCG: 0.5 SUSPENSION RESPIRATORY (INHALATION) at 08:12

## 2025-04-06 RX ADMIN — GUAIFENESIN 400 MG: 400 TABLET ORAL at 20:15

## 2025-04-06 RX ADMIN — DOCUSATE SODIUM 100 MG: 100 CAPSULE, LIQUID FILLED ORAL at 08:41

## 2025-04-06 RX ADMIN — IPRATROPIUM BROMIDE AND ALBUTEROL SULFATE 1 DOSE: 2.5; .5 SOLUTION RESPIRATORY (INHALATION) at 15:50

## 2025-04-06 RX ADMIN — ARFORMOTEROL TARTRATE 15 MCG: 15 SOLUTION RESPIRATORY (INHALATION) at 20:09

## 2025-04-06 RX ADMIN — ASPIRIN 81 MG: 81 TABLET, COATED ORAL at 08:41

## 2025-04-06 RX ADMIN — CLOPIDOGREL BISULFATE 75 MG: 75 TABLET, FILM COATED ORAL at 08:42

## 2025-04-06 RX ADMIN — GUAIFENESIN 400 MG: 400 TABLET ORAL at 08:41

## 2025-04-06 RX ADMIN — ONDANSETRON 4 MG: 2 INJECTION, SOLUTION INTRAMUSCULAR; INTRAVENOUS at 18:51

## 2025-04-06 RX ADMIN — CLONAZEPAM 0.5 MG: 0.5 TABLET ORAL at 20:15

## 2025-04-06 RX ADMIN — HYDROCORTISONE ACETATE, PRAMOXINE HCL: 2.5; 1 CREAM TOPICAL at 20:19

## 2025-04-06 RX ADMIN — PANTOPRAZOLE SODIUM 40 MG: 40 TABLET, DELAYED RELEASE ORAL at 16:50

## 2025-04-06 RX ADMIN — METOPROLOL TARTRATE 12.5 MG: 25 TABLET, FILM COATED ORAL at 20:15

## 2025-04-06 RX ADMIN — IPRATROPIUM BROMIDE AND ALBUTEROL SULFATE 1 DOSE: 2.5; .5 SOLUTION RESPIRATORY (INHALATION) at 12:22

## 2025-04-06 RX ADMIN — IPRATROPIUM BROMIDE AND ALBUTEROL SULFATE 1 DOSE: 2.5; .5 SOLUTION RESPIRATORY (INHALATION) at 20:09

## 2025-04-06 RX ADMIN — GUAIFENESIN 400 MG: 400 TABLET ORAL at 14:57

## 2025-04-06 RX ADMIN — DOCUSATE SODIUM 100 MG: 100 CAPSULE, LIQUID FILLED ORAL at 20:16

## 2025-04-06 RX ADMIN — FUROSEMIDE 20 MG: 20 TABLET ORAL at 08:41

## 2025-04-06 RX ADMIN — METHYLPREDNISOLONE SODIUM SUCCINATE 60 MG: 125 INJECTION INTRAMUSCULAR; INTRAVENOUS at 16:49

## 2025-04-06 RX ADMIN — ARFORMOTEROL TARTRATE 15 MCG: 15 SOLUTION RESPIRATORY (INHALATION) at 08:12

## 2025-04-06 RX ADMIN — MIRTAZAPINE 15 MG: 15 TABLET, FILM COATED ORAL at 20:16

## 2025-04-06 RX ADMIN — GUAIFENESIN SYRUP AND DEXTROMETHORPHAN 10 ML: 100; 10 SYRUP ORAL at 20:15

## 2025-04-06 RX ADMIN — EZETIMIBE 10 MG: 10 TABLET ORAL at 08:42

## 2025-04-06 RX ADMIN — LISINOPRIL 20 MG: 20 TABLET ORAL at 08:41

## 2025-04-06 RX ADMIN — METHYLPREDNISOLONE SODIUM SUCCINATE 40 MG: 40 INJECTION, POWDER, FOR SOLUTION INTRAMUSCULAR; INTRAVENOUS at 08:56

## 2025-04-06 RX ADMIN — METOPROLOL TARTRATE 12.5 MG: 25 TABLET, FILM COATED ORAL at 08:41

## 2025-04-06 RX ADMIN — Medication 3 MG: at 20:16

## 2025-04-06 RX ADMIN — IPRATROPIUM BROMIDE AND ALBUTEROL SULFATE 1 DOSE: 2.5; .5 SOLUTION RESPIRATORY (INHALATION) at 08:12

## 2025-04-06 RX ADMIN — SODIUM CHLORIDE, PRESERVATIVE FREE 10 ML: 5 INJECTION INTRAVENOUS at 08:42

## 2025-04-06 RX ADMIN — HYDROCODONE BITARTRATE AND ACETAMINOPHEN 1 TABLET: 5; 325 TABLET ORAL at 20:15

## 2025-04-06 RX ADMIN — SODIUM CHLORIDE, PRESERVATIVE FREE 10 ML: 5 INJECTION INTRAVENOUS at 20:16

## 2025-04-06 RX ADMIN — BUDESONIDE 1000 MCG: 0.5 SUSPENSION RESPIRATORY (INHALATION) at 20:09

## 2025-04-06 RX ADMIN — HYDROCORTISONE ACETATE, PRAMOXINE HCL: 2.5; 1 CREAM TOPICAL at 14:57

## 2025-04-06 RX ADMIN — CLONAZEPAM 0.5 MG: 0.5 TABLET ORAL at 08:56

## 2025-04-06 RX ADMIN — DESMOPRESSIN ACETATE 40 MG: 0.2 TABLET ORAL at 08:41

## 2025-04-06 RX ADMIN — HYDROCORTISONE ACETATE, PRAMOXINE HCL: 2.5; 1 CREAM TOPICAL at 09:00

## 2025-04-06 ASSESSMENT — PAIN SCALES - GENERAL: PAINLEVEL_OUTOF10: 8

## 2025-04-07 LAB
ALBUMIN SERPL-MCNC: 3.2 G/DL (ref 3.5–5.2)
ALP SERPL-CCNC: 78 U/L (ref 35–104)
ALT SERPL-CCNC: 27 U/L (ref 0–32)
ANION GAP SERPL CALCULATED.3IONS-SCNC: 12 MMOL/L (ref 7–16)
AST SERPL-CCNC: 16 U/L (ref 0–31)
BASOPHILS # BLD: 0.05 K/UL (ref 0–0.2)
BASOPHILS NFR BLD: 0 % (ref 0–2)
BILIRUB SERPL-MCNC: 0.4 MG/DL (ref 0–1.2)
BUN SERPL-MCNC: 34 MG/DL (ref 6–23)
CALCIUM SERPL-MCNC: 9.3 MG/DL (ref 8.6–10.2)
CHLORIDE SERPL-SCNC: 97 MMOL/L (ref 98–107)
CO2 SERPL-SCNC: 26 MMOL/L (ref 22–29)
CREAT SERPL-MCNC: 1.1 MG/DL (ref 0.5–1)
EOSINOPHIL # BLD: 0 K/UL (ref 0.05–0.5)
EOSINOPHILS RELATIVE PERCENT: 0 % (ref 0–6)
ERYTHROCYTE [DISTWIDTH] IN BLOOD BY AUTOMATED COUNT: 12.6 % (ref 11.5–15)
GFR, ESTIMATED: 51 ML/MIN/1.73M2
GLUCOSE SERPL-MCNC: 184 MG/DL (ref 74–99)
HCT VFR BLD AUTO: 37.7 % (ref 34–48)
HGB BLD-MCNC: 11.9 G/DL (ref 11.5–15.5)
IMM GRANULOCYTES # BLD AUTO: 0.36 K/UL (ref 0–0.58)
IMM GRANULOCYTES NFR BLD: 2 % (ref 0–5)
LYMPHOCYTES NFR BLD: 0.82 K/UL (ref 1.5–4)
LYMPHOCYTES RELATIVE PERCENT: 5 % (ref 20–42)
MCH RBC QN AUTO: 30.3 PG (ref 26–35)
MCHC RBC AUTO-ENTMCNC: 31.6 G/DL (ref 32–34.5)
MCV RBC AUTO: 95.9 FL (ref 80–99.9)
MONOCYTES NFR BLD: 0.41 K/UL (ref 0.1–0.95)
MONOCYTES NFR BLD: 3 % (ref 2–12)
NEUTROPHILS NFR BLD: 90 % (ref 43–80)
NEUTS SEG NFR BLD: 14.55 K/UL (ref 1.8–7.3)
PLATELET # BLD AUTO: 361 K/UL (ref 130–450)
PMV BLD AUTO: 9.7 FL (ref 7–12)
POTASSIUM SERPL-SCNC: 4.2 MMOL/L (ref 3.5–5)
PROT SERPL-MCNC: 5.7 G/DL (ref 6.4–8.3)
RBC # BLD AUTO: 3.93 M/UL (ref 3.5–5.5)
SODIUM SERPL-SCNC: 135 MMOL/L (ref 132–146)
WBC OTHER # BLD: 16.2 K/UL (ref 4.5–11.5)

## 2025-04-07 PROCEDURE — 6360000002 HC RX W HCPCS: Performed by: INTERNAL MEDICINE

## 2025-04-07 PROCEDURE — 94640 AIRWAY INHALATION TREATMENT: CPT

## 2025-04-07 PROCEDURE — 6370000000 HC RX 637 (ALT 250 FOR IP): Performed by: INTERNAL MEDICINE

## 2025-04-07 PROCEDURE — 6370000000 HC RX 637 (ALT 250 FOR IP): Performed by: PHYSICIAN ASSISTANT

## 2025-04-07 PROCEDURE — 80053 COMPREHEN METABOLIC PANEL: CPT

## 2025-04-07 PROCEDURE — 2700000000 HC OXYGEN THERAPY PER DAY

## 2025-04-07 PROCEDURE — 85025 COMPLETE CBC W/AUTO DIFF WBC: CPT

## 2025-04-07 PROCEDURE — 94669 MECHANICAL CHEST WALL OSCILL: CPT

## 2025-04-07 PROCEDURE — 6370000000 HC RX 637 (ALT 250 FOR IP): Performed by: NURSE PRACTITIONER

## 2025-04-07 PROCEDURE — 6370000000 HC RX 637 (ALT 250 FOR IP): Performed by: HOSPITALIST

## 2025-04-07 PROCEDURE — 2500000003 HC RX 250 WO HCPCS: Performed by: INTERNAL MEDICINE

## 2025-04-07 PROCEDURE — 2060000000 HC ICU INTERMEDIATE R&B

## 2025-04-07 RX ORDER — LACTULOSE 10 G/15ML
20 SOLUTION ORAL DAILY
Status: DISCONTINUED | OUTPATIENT
Start: 2025-04-08 | End: 2025-04-09 | Stop reason: HOSPADM

## 2025-04-07 RX ADMIN — GUAIFENESIN 400 MG: 400 TABLET ORAL at 18:31

## 2025-04-07 RX ADMIN — IPRATROPIUM BROMIDE AND ALBUTEROL SULFATE 1 DOSE: 2.5; .5 SOLUTION RESPIRATORY (INHALATION) at 19:46

## 2025-04-07 RX ADMIN — GUAIFENESIN SYRUP AND DEXTROMETHORPHAN 10 ML: 100; 10 SYRUP ORAL at 21:52

## 2025-04-07 RX ADMIN — FUROSEMIDE 20 MG: 20 TABLET ORAL at 10:02

## 2025-04-07 RX ADMIN — HYDROCODONE BITARTRATE AND ACETAMINOPHEN 1 TABLET: 5; 325 TABLET ORAL at 11:01

## 2025-04-07 RX ADMIN — EZETIMIBE 10 MG: 10 TABLET ORAL at 10:13

## 2025-04-07 RX ADMIN — HYDROCORTISONE ACETATE, PRAMOXINE HCL: 2.5; 1 CREAM TOPICAL at 10:04

## 2025-04-07 RX ADMIN — METOPROLOL TARTRATE 12.5 MG: 25 TABLET, FILM COATED ORAL at 10:01

## 2025-04-07 RX ADMIN — HYDROCODONE BITARTRATE AND ACETAMINOPHEN 1 TABLET: 5; 325 TABLET ORAL at 21:42

## 2025-04-07 RX ADMIN — METHYLPREDNISOLONE SODIUM SUCCINATE 60 MG: 125 INJECTION INTRAMUSCULAR; INTRAVENOUS at 10:01

## 2025-04-07 RX ADMIN — ARFORMOTEROL TARTRATE 15 MCG: 15 SOLUTION RESPIRATORY (INHALATION) at 19:45

## 2025-04-07 RX ADMIN — BUDESONIDE 1000 MCG: 0.5 SUSPENSION RESPIRATORY (INHALATION) at 19:46

## 2025-04-07 RX ADMIN — DOCUSATE SODIUM 100 MG: 100 CAPSULE, LIQUID FILLED ORAL at 10:01

## 2025-04-07 RX ADMIN — MIRTAZAPINE 15 MG: 15 TABLET, FILM COATED ORAL at 21:43

## 2025-04-07 RX ADMIN — GUAIFENESIN 400 MG: 400 TABLET ORAL at 10:02

## 2025-04-07 RX ADMIN — LISINOPRIL 20 MG: 20 TABLET ORAL at 10:02

## 2025-04-07 RX ADMIN — HYDROCORTISONE ACETATE, PRAMOXINE HCL: 2.5; 1 CREAM TOPICAL at 21:52

## 2025-04-07 RX ADMIN — METHYLPREDNISOLONE SODIUM SUCCINATE 60 MG: 125 INJECTION INTRAMUSCULAR; INTRAVENOUS at 23:21

## 2025-04-07 RX ADMIN — IPRATROPIUM BROMIDE AND ALBUTEROL SULFATE 1 DOSE: 2.5; .5 SOLUTION RESPIRATORY (INHALATION) at 07:58

## 2025-04-07 RX ADMIN — GUAIFENESIN SYRUP AND DEXTROMETHORPHAN 10 ML: 100; 10 SYRUP ORAL at 10:03

## 2025-04-07 RX ADMIN — ARFORMOTEROL TARTRATE 15 MCG: 15 SOLUTION RESPIRATORY (INHALATION) at 07:58

## 2025-04-07 RX ADMIN — DOCUSATE SODIUM 100 MG: 100 CAPSULE, LIQUID FILLED ORAL at 21:42

## 2025-04-07 RX ADMIN — SODIUM CHLORIDE, PRESERVATIVE FREE 10 ML: 5 INJECTION INTRAVENOUS at 21:53

## 2025-04-07 RX ADMIN — BUDESONIDE 1000 MCG: 0.5 SUSPENSION RESPIRATORY (INHALATION) at 07:58

## 2025-04-07 RX ADMIN — CLOPIDOGREL BISULFATE 75 MG: 75 TABLET, FILM COATED ORAL at 10:02

## 2025-04-07 RX ADMIN — DESMOPRESSIN ACETATE 40 MG: 0.2 TABLET ORAL at 10:02

## 2025-04-07 RX ADMIN — IPRATROPIUM BROMIDE AND ALBUTEROL SULFATE 1 DOSE: 2.5; .5 SOLUTION RESPIRATORY (INHALATION) at 12:22

## 2025-04-07 RX ADMIN — CLONAZEPAM 0.5 MG: 0.5 TABLET ORAL at 21:43

## 2025-04-07 RX ADMIN — GUAIFENESIN 400 MG: 400 TABLET ORAL at 21:43

## 2025-04-07 RX ADMIN — PANTOPRAZOLE SODIUM 40 MG: 40 TABLET, DELAYED RELEASE ORAL at 05:30

## 2025-04-07 RX ADMIN — IPRATROPIUM BROMIDE AND ALBUTEROL SULFATE 1 DOSE: 2.5; .5 SOLUTION RESPIRATORY (INHALATION) at 15:57

## 2025-04-07 RX ADMIN — SODIUM CHLORIDE, PRESERVATIVE FREE 10 ML: 5 INJECTION INTRAVENOUS at 10:14

## 2025-04-07 RX ADMIN — METHYLPREDNISOLONE SODIUM SUCCINATE 60 MG: 125 INJECTION INTRAMUSCULAR; INTRAVENOUS at 18:31

## 2025-04-07 RX ADMIN — METOPROLOL TARTRATE 12.5 MG: 25 TABLET, FILM COATED ORAL at 21:43

## 2025-04-07 RX ADMIN — ASPIRIN 81 MG: 81 TABLET, COATED ORAL at 10:02

## 2025-04-07 RX ADMIN — CLONAZEPAM 0.5 MG: 0.5 TABLET ORAL at 12:21

## 2025-04-07 RX ADMIN — Medication 3 MG: at 23:20

## 2025-04-07 RX ADMIN — PANTOPRAZOLE SODIUM 40 MG: 40 TABLET, DELAYED RELEASE ORAL at 18:31

## 2025-04-07 ASSESSMENT — PAIN SCALES - GENERAL
PAINLEVEL_OUTOF10: 8
PAINLEVEL_OUTOF10: 4

## 2025-04-07 ASSESSMENT — PAIN DESCRIPTION - DESCRIPTORS: DESCRIPTORS: ACHING

## 2025-04-07 ASSESSMENT — PAIN DESCRIPTION - LOCATION: LOCATION: GENERALIZED

## 2025-04-07 NOTE — PLAN OF CARE
Problem: Chronic Conditions and Co-morbidities  Goal: Patient's chronic conditions and co-morbidity symptoms are monitored and maintained or improved  Outcome: Progressing     Problem: Discharge Planning  Goal: Discharge to home or other facility with appropriate resources  Outcome: Progressing     Problem: Safety - Adult  Goal: Free from fall injury  Outcome: Progressing  Flowsheets (Taken 4/6/2025 2139 by Junie Reyez RN)  Free From Fall Injury: Instruct family/caregiver on patient safety     Problem: Pain  Goal: Verbalizes/displays adequate comfort level or baseline comfort level  Outcome: Progressing

## 2025-04-07 NOTE — CARE COORDINATION
GI and Pulmonology are following. Plan to wean steroids, GI plan for EGD/colonoscopy as OP. Currently on  nebs, IV solumedrol, PO lasix, at baseline 3L O2.  Patient is from [facility], discharge plan is to return when medically stable. Patient is a Medicaid bed hold, no auth required to return. JULIA initiated, envelope with demos and transport form in chart. Will continue to follow.  Holly MAYSN, RN  Case Management

## 2025-04-08 LAB
ALBUMIN SERPL-MCNC: 3.3 G/DL (ref 3.5–5.2)
ALP SERPL-CCNC: 77 U/L (ref 35–104)
ALT SERPL-CCNC: 27 U/L (ref 0–32)
ANION GAP SERPL CALCULATED.3IONS-SCNC: 9 MMOL/L (ref 7–16)
AST SERPL-CCNC: 14 U/L (ref 0–31)
ATYPICAL LYMPHOCYTE ABSOLUTE COUNT: 0.16 K/UL (ref 0–0.46)
ATYPICAL LYMPHOCYTES: 1 % (ref 0–4)
BASOPHILS # BLD: 0 K/UL (ref 0–0.2)
BASOPHILS NFR BLD: 0 % (ref 0–2)
BILIRUB SERPL-MCNC: 0.4 MG/DL (ref 0–1.2)
BUN SERPL-MCNC: 33 MG/DL (ref 6–23)
CALCIUM SERPL-MCNC: 9.3 MG/DL (ref 8.6–10.2)
CHLORIDE SERPL-SCNC: 97 MMOL/L (ref 98–107)
CO2 SERPL-SCNC: 28 MMOL/L (ref 22–29)
CREAT SERPL-MCNC: 1.1 MG/DL (ref 0.5–1)
EOSINOPHIL # BLD: 0 K/UL (ref 0.05–0.5)
EOSINOPHILS RELATIVE PERCENT: 0 % (ref 0–6)
ERYTHROCYTE [DISTWIDTH] IN BLOOD BY AUTOMATED COUNT: 12.6 % (ref 11.5–15)
GFR, ESTIMATED: 51 ML/MIN/1.73M2
GLUCOSE SERPL-MCNC: 161 MG/DL (ref 74–99)
HCT VFR BLD AUTO: 40 % (ref 34–48)
HGB BLD-MCNC: 13.3 G/DL (ref 11.5–15.5)
LYMPHOCYTES NFR BLD: 0.16 K/UL (ref 1.5–4)
LYMPHOCYTES RELATIVE PERCENT: 1 % (ref 20–42)
MCH RBC QN AUTO: 31.1 PG (ref 26–35)
MCHC RBC AUTO-ENTMCNC: 33.3 G/DL (ref 32–34.5)
MCV RBC AUTO: 93.5 FL (ref 80–99.9)
METAMYELOCYTES ABSOLUTE COUNT: 0.32 K/UL (ref 0–0.12)
METAMYELOCYTES: 2 % (ref 0–1)
MONOCYTES NFR BLD: 0.16 K/UL (ref 0.1–0.95)
MONOCYTES NFR BLD: 1 % (ref 2–12)
MYELOCYTES ABSOLUTE COUNT: 0.16 K/UL
MYELOCYTES: 1 %
NEUTROPHILS NFR BLD: 95 % (ref 43–80)
NEUTS SEG NFR BLD: 17.53 K/UL (ref 1.8–7.3)
PLATELET # BLD AUTO: 392 K/UL (ref 130–450)
PMV BLD AUTO: 9.8 FL (ref 7–12)
POTASSIUM SERPL-SCNC: 5.5 MMOL/L (ref 3.5–5)
PROT SERPL-MCNC: 5.4 G/DL (ref 6.4–8.3)
RBC # BLD AUTO: 4.28 M/UL (ref 3.5–5.5)
RBC # BLD: ABNORMAL 10*6/UL
SODIUM SERPL-SCNC: 134 MMOL/L (ref 132–146)
WBC OTHER # BLD: 18.5 K/UL (ref 4.5–11.5)

## 2025-04-08 PROCEDURE — 94640 AIRWAY INHALATION TREATMENT: CPT

## 2025-04-08 PROCEDURE — 6370000000 HC RX 637 (ALT 250 FOR IP): Performed by: NURSE PRACTITIONER

## 2025-04-08 PROCEDURE — 94669 MECHANICAL CHEST WALL OSCILL: CPT

## 2025-04-08 PROCEDURE — 6360000002 HC RX W HCPCS: Performed by: INTERNAL MEDICINE

## 2025-04-08 PROCEDURE — 6370000000 HC RX 637 (ALT 250 FOR IP): Performed by: INTERNAL MEDICINE

## 2025-04-08 PROCEDURE — 2500000003 HC RX 250 WO HCPCS: Performed by: INTERNAL MEDICINE

## 2025-04-08 PROCEDURE — 85025 COMPLETE CBC W/AUTO DIFF WBC: CPT

## 2025-04-08 PROCEDURE — 6370000000 HC RX 637 (ALT 250 FOR IP): Performed by: HOSPITALIST

## 2025-04-08 PROCEDURE — 80053 COMPREHEN METABOLIC PANEL: CPT

## 2025-04-08 PROCEDURE — 6370000000 HC RX 637 (ALT 250 FOR IP): Performed by: PHYSICIAN ASSISTANT

## 2025-04-08 PROCEDURE — 1200000000 HC SEMI PRIVATE

## 2025-04-08 PROCEDURE — 2700000000 HC OXYGEN THERAPY PER DAY

## 2025-04-08 PROCEDURE — 36415 COLL VENOUS BLD VENIPUNCTURE: CPT

## 2025-04-08 RX ORDER — CLONAZEPAM 1 MG/1
1 TABLET ORAL EVERY 12 HOURS PRN
Qty: 6 TABLET | Refills: 0 | Status: SHIPPED | OUTPATIENT
Start: 2025-04-08 | End: 2025-04-11

## 2025-04-08 RX ORDER — HYDROCODONE BITARTRATE AND ACETAMINOPHEN 5; 325 MG/1; MG/1
1 TABLET ORAL EVERY 6 HOURS PRN
Qty: 12 TABLET | Refills: 0 | Status: SHIPPED | OUTPATIENT
Start: 2025-04-08 | End: 2025-04-11

## 2025-04-08 RX ORDER — LACTULOSE 10 G/15ML
20 SOLUTION ORAL DAILY
DISCHARGE
Start: 2025-04-09

## 2025-04-08 RX ORDER — IPRATROPIUM BROMIDE AND ALBUTEROL SULFATE 2.5; .5 MG/3ML; MG/3ML
3 SOLUTION RESPIRATORY (INHALATION)
DISCHARGE
Start: 2025-04-08 | End: 2025-04-09 | Stop reason: HOSPADM

## 2025-04-08 RX ORDER — PSEUDOEPHEDRINE HCL 30 MG
100 TABLET ORAL 2 TIMES DAILY
DISCHARGE
Start: 2025-04-08

## 2025-04-08 RX ORDER — PREDNISONE 10 MG/1
TABLET ORAL
DISCHARGE
Start: 2025-04-08 | End: 2025-04-18

## 2025-04-08 RX ORDER — ARFORMOTEROL TARTRATE 15 UG/2ML
15 SOLUTION RESPIRATORY (INHALATION)
DISCHARGE
Start: 2025-04-08

## 2025-04-08 RX ADMIN — CLOPIDOGREL BISULFATE 75 MG: 75 TABLET, FILM COATED ORAL at 09:06

## 2025-04-08 RX ADMIN — ARFORMOTEROL TARTRATE 15 MCG: 15 SOLUTION RESPIRATORY (INHALATION) at 07:47

## 2025-04-08 RX ADMIN — METOPROLOL TARTRATE 12.5 MG: 25 TABLET, FILM COATED ORAL at 09:06

## 2025-04-08 RX ADMIN — METHYLPREDNISOLONE SODIUM SUCCINATE 60 MG: 125 INJECTION INTRAMUSCULAR; INTRAVENOUS at 17:26

## 2025-04-08 RX ADMIN — ASPIRIN 81 MG: 81 TABLET, COATED ORAL at 09:06

## 2025-04-08 RX ADMIN — DOCUSATE SODIUM 100 MG: 100 CAPSULE, LIQUID FILLED ORAL at 09:06

## 2025-04-08 RX ADMIN — HYDROCORTISONE ACETATE, PRAMOXINE HCL: 2.5; 1 CREAM TOPICAL at 14:20

## 2025-04-08 RX ADMIN — METHYLPREDNISOLONE SODIUM SUCCINATE 60 MG: 125 INJECTION INTRAMUSCULAR; INTRAVENOUS at 09:05

## 2025-04-08 RX ADMIN — LISINOPRIL 20 MG: 20 TABLET ORAL at 09:06

## 2025-04-08 RX ADMIN — CLONAZEPAM 0.5 MG: 0.5 TABLET ORAL at 14:18

## 2025-04-08 RX ADMIN — LACTULOSE 20 G: 20 SOLUTION ORAL at 09:05

## 2025-04-08 RX ADMIN — FUROSEMIDE 20 MG: 20 TABLET ORAL at 09:06

## 2025-04-08 RX ADMIN — PANTOPRAZOLE SODIUM 40 MG: 40 TABLET, DELAYED RELEASE ORAL at 14:18

## 2025-04-08 RX ADMIN — METOPROLOL TARTRATE 12.5 MG: 25 TABLET, FILM COATED ORAL at 21:30

## 2025-04-08 RX ADMIN — HYDROCORTISONE ACETATE, PRAMOXINE HCL: 2.5; 1 CREAM TOPICAL at 21:31

## 2025-04-08 RX ADMIN — BUDESONIDE 1000 MCG: 0.5 SUSPENSION RESPIRATORY (INHALATION) at 07:47

## 2025-04-08 RX ADMIN — IPRATROPIUM BROMIDE AND ALBUTEROL SULFATE 1 DOSE: 2.5; .5 SOLUTION RESPIRATORY (INHALATION) at 07:47

## 2025-04-08 RX ADMIN — EZETIMIBE 10 MG: 10 TABLET ORAL at 09:06

## 2025-04-08 RX ADMIN — GUAIFENESIN SYRUP AND DEXTROMETHORPHAN 10 ML: 100; 10 SYRUP ORAL at 23:11

## 2025-04-08 RX ADMIN — BUDESONIDE 1000 MCG: 0.5 SUSPENSION RESPIRATORY (INHALATION) at 20:54

## 2025-04-08 RX ADMIN — MIRTAZAPINE 15 MG: 15 TABLET, FILM COATED ORAL at 21:31

## 2025-04-08 RX ADMIN — DOCUSATE SODIUM 100 MG: 100 CAPSULE, LIQUID FILLED ORAL at 21:30

## 2025-04-08 RX ADMIN — PANTOPRAZOLE SODIUM 40 MG: 40 TABLET, DELAYED RELEASE ORAL at 05:32

## 2025-04-08 RX ADMIN — IPRATROPIUM BROMIDE AND ALBUTEROL SULFATE 1 DOSE: 2.5; .5 SOLUTION RESPIRATORY (INHALATION) at 20:54

## 2025-04-08 RX ADMIN — IPRATROPIUM BROMIDE AND ALBUTEROL SULFATE 1 DOSE: 2.5; .5 SOLUTION RESPIRATORY (INHALATION) at 13:04

## 2025-04-08 RX ADMIN — DESMOPRESSIN ACETATE 40 MG: 0.2 TABLET ORAL at 09:06

## 2025-04-08 RX ADMIN — SODIUM CHLORIDE, PRESERVATIVE FREE 10 ML: 5 INJECTION INTRAVENOUS at 09:06

## 2025-04-08 RX ADMIN — GUAIFENESIN 400 MG: 400 TABLET ORAL at 14:18

## 2025-04-08 RX ADMIN — GUAIFENESIN 400 MG: 400 TABLET ORAL at 21:31

## 2025-04-08 RX ADMIN — ARFORMOTEROL TARTRATE 15 MCG: 15 SOLUTION RESPIRATORY (INHALATION) at 20:54

## 2025-04-08 RX ADMIN — GUAIFENESIN 400 MG: 400 TABLET ORAL at 09:06

## 2025-04-08 RX ADMIN — Medication 3 MG: at 21:39

## 2025-04-08 RX ADMIN — HYDROCODONE BITARTRATE AND ACETAMINOPHEN 1 TABLET: 5; 325 TABLET ORAL at 09:05

## 2025-04-08 RX ADMIN — HYDROCODONE BITARTRATE AND ACETAMINOPHEN 1 TABLET: 5; 325 TABLET ORAL at 21:39

## 2025-04-08 RX ADMIN — SODIUM CHLORIDE, PRESERVATIVE FREE 10 ML: 5 INJECTION INTRAVENOUS at 21:32

## 2025-04-08 ASSESSMENT — PAIN SCALES - WONG BAKER: WONGBAKER_NUMERICALRESPONSE: NO HURT

## 2025-04-08 ASSESSMENT — PAIN SCALES - GENERAL
PAINLEVEL_OUTOF10: 8
PAINLEVEL_OUTOF10: 5
PAINLEVEL_OUTOF10: 0
PAINLEVEL_OUTOF10: 9

## 2025-04-08 ASSESSMENT — PAIN DESCRIPTION - LOCATION
LOCATION: GENERALIZED
LOCATION: GENERALIZED

## 2025-04-08 ASSESSMENT — PAIN DESCRIPTION - DESCRIPTORS
DESCRIPTORS: ACHING;DISCOMFORT
DESCRIPTORS: ACHING;DISCOMFORT;CRAMPING

## 2025-04-08 ASSESSMENT — PAIN DESCRIPTION - ORIENTATION: ORIENTATION: RIGHT;LEFT

## 2025-04-09 VITALS
RESPIRATION RATE: 18 BRPM | DIASTOLIC BLOOD PRESSURE: 82 MMHG | WEIGHT: 183.64 LBS | SYSTOLIC BLOOD PRESSURE: 158 MMHG | HEART RATE: 73 BPM | HEIGHT: 65 IN | TEMPERATURE: 98.1 F | OXYGEN SATURATION: 96 % | BODY MASS INDEX: 30.6 KG/M2

## 2025-04-09 LAB
ALBUMIN SERPL-MCNC: 3.1 G/DL (ref 3.5–5.2)
ALP SERPL-CCNC: 84 U/L (ref 35–104)
ALT SERPL-CCNC: 27 U/L (ref 0–32)
ANION GAP SERPL CALCULATED.3IONS-SCNC: 11 MMOL/L (ref 7–16)
AST SERPL-CCNC: 16 U/L (ref 0–31)
BASOPHILS # BLD: 0 K/UL (ref 0–0.2)
BASOPHILS NFR BLD: 0 % (ref 0–2)
BILIRUB SERPL-MCNC: 0.4 MG/DL (ref 0–1.2)
BUN SERPL-MCNC: 32 MG/DL (ref 6–23)
CALCIUM SERPL-MCNC: 9.3 MG/DL (ref 8.6–10.2)
CHLORIDE SERPL-SCNC: 98 MMOL/L (ref 98–107)
CO2 SERPL-SCNC: 26 MMOL/L (ref 22–29)
CREAT SERPL-MCNC: 1.2 MG/DL (ref 0.5–1)
EOSINOPHIL # BLD: 0 K/UL (ref 0.05–0.5)
EOSINOPHILS RELATIVE PERCENT: 0 % (ref 0–6)
ERYTHROCYTE [DISTWIDTH] IN BLOOD BY AUTOMATED COUNT: 12.7 % (ref 11.5–15)
GFR, ESTIMATED: 49 ML/MIN/1.73M2
GLUCOSE SERPL-MCNC: 168 MG/DL (ref 74–99)
HCT VFR BLD AUTO: 39.1 % (ref 34–48)
HGB BLD-MCNC: 12.9 G/DL (ref 11.5–15.5)
LYMPHOCYTES NFR BLD: 0.66 K/UL (ref 1.5–4)
LYMPHOCYTES RELATIVE PERCENT: 3 % (ref 20–42)
MCH RBC QN AUTO: 30.8 PG (ref 26–35)
MCHC RBC AUTO-ENTMCNC: 33 G/DL (ref 32–34.5)
MCV RBC AUTO: 93.3 FL (ref 80–99.9)
METAMYELOCYTES ABSOLUTE COUNT: 0.16 K/UL (ref 0–0.12)
METAMYELOCYTES: 1 % (ref 0–1)
MONOCYTES NFR BLD: 0.49 K/UL (ref 0.1–0.95)
MONOCYTES NFR BLD: 3 % (ref 2–12)
MYELOCYTES ABSOLUTE COUNT: 0.16 K/UL
MYELOCYTES: 1 %
NEUTROPHILS NFR BLD: 92 % (ref 43–80)
NEUTS SEG NFR BLD: 17.62 K/UL (ref 1.8–7.3)
PLATELET # BLD AUTO: 404 K/UL (ref 130–450)
PMV BLD AUTO: 9.6 FL (ref 7–12)
POTASSIUM SERPL-SCNC: 4 MMOL/L (ref 3.5–5)
PROT SERPL-MCNC: 5.5 G/DL (ref 6.4–8.3)
RBC # BLD AUTO: 4.19 M/UL (ref 3.5–5.5)
RBC # BLD: ABNORMAL 10*6/UL
RBC # BLD: ABNORMAL 10*6/UL
SODIUM SERPL-SCNC: 135 MMOL/L (ref 132–146)
WBC # BLD: ABNORMAL 10*3/UL
WBC OTHER # BLD: 19.1 K/UL (ref 4.5–11.5)

## 2025-04-09 PROCEDURE — 6370000000 HC RX 637 (ALT 250 FOR IP): Performed by: INTERNAL MEDICINE

## 2025-04-09 PROCEDURE — 2500000003 HC RX 250 WO HCPCS: Performed by: INTERNAL MEDICINE

## 2025-04-09 PROCEDURE — 6370000000 HC RX 637 (ALT 250 FOR IP): Performed by: NURSE PRACTITIONER

## 2025-04-09 PROCEDURE — 6370000000 HC RX 637 (ALT 250 FOR IP): Performed by: PHYSICIAN ASSISTANT

## 2025-04-09 PROCEDURE — 6360000002 HC RX W HCPCS: Performed by: INTERNAL MEDICINE

## 2025-04-09 PROCEDURE — 6370000000 HC RX 637 (ALT 250 FOR IP): Performed by: HOSPITALIST

## 2025-04-09 PROCEDURE — 2700000000 HC OXYGEN THERAPY PER DAY

## 2025-04-09 PROCEDURE — 94640 AIRWAY INHALATION TREATMENT: CPT

## 2025-04-09 PROCEDURE — 94669 MECHANICAL CHEST WALL OSCILL: CPT

## 2025-04-09 PROCEDURE — 85025 COMPLETE CBC W/AUTO DIFF WBC: CPT

## 2025-04-09 PROCEDURE — 2500000003 HC RX 250 WO HCPCS

## 2025-04-09 PROCEDURE — 80053 COMPREHEN METABOLIC PANEL: CPT

## 2025-04-09 RX ORDER — BENZONATATE 100 MG/1
200 CAPSULE ORAL 3 TIMES DAILY
Status: DISCONTINUED | OUTPATIENT
Start: 2025-04-09 | End: 2025-04-09 | Stop reason: HOSPADM

## 2025-04-09 RX ORDER — PREDNISONE 20 MG/1
40 TABLET ORAL DAILY
Status: DISCONTINUED | OUTPATIENT
Start: 2025-04-10 | End: 2025-04-09 | Stop reason: HOSPADM

## 2025-04-09 RX ORDER — WATER 10 ML/10ML
INJECTION INTRAMUSCULAR; INTRAVENOUS; SUBCUTANEOUS
Status: COMPLETED
Start: 2025-04-09 | End: 2025-04-09

## 2025-04-09 RX ADMIN — METOPROLOL TARTRATE 12.5 MG: 25 TABLET, FILM COATED ORAL at 09:16

## 2025-04-09 RX ADMIN — METHYLPREDNISOLONE SODIUM SUCCINATE 40 MG: 40 INJECTION, POWDER, FOR SOLUTION INTRAMUSCULAR; INTRAVENOUS at 12:30

## 2025-04-09 RX ADMIN — SODIUM CHLORIDE, PRESERVATIVE FREE 10 ML: 5 INJECTION INTRAVENOUS at 09:20

## 2025-04-09 RX ADMIN — HYDROCODONE BITARTRATE AND ACETAMINOPHEN 1 TABLET: 5; 325 TABLET ORAL at 05:55

## 2025-04-09 RX ADMIN — ARFORMOTEROL TARTRATE 15 MCG: 15 SOLUTION RESPIRATORY (INHALATION) at 07:55

## 2025-04-09 RX ADMIN — LISINOPRIL 20 MG: 20 TABLET ORAL at 09:16

## 2025-04-09 RX ADMIN — GUAIFENESIN 400 MG: 400 TABLET ORAL at 09:16

## 2025-04-09 RX ADMIN — GUAIFENESIN SYRUP AND DEXTROMETHORPHAN 10 ML: 100; 10 SYRUP ORAL at 05:55

## 2025-04-09 RX ADMIN — LACTULOSE 20 G: 20 SOLUTION ORAL at 09:16

## 2025-04-09 RX ADMIN — BENZONATATE 200 MG: 100 CAPSULE ORAL at 09:16

## 2025-04-09 RX ADMIN — CLOPIDOGREL BISULFATE 75 MG: 75 TABLET, FILM COATED ORAL at 09:16

## 2025-04-09 RX ADMIN — METHYLPREDNISOLONE SODIUM SUCCINATE 60 MG: 125 INJECTION INTRAMUSCULAR; INTRAVENOUS at 01:41

## 2025-04-09 RX ADMIN — DOCUSATE SODIUM 100 MG: 100 CAPSULE, LIQUID FILLED ORAL at 09:16

## 2025-04-09 RX ADMIN — FUROSEMIDE 20 MG: 20 TABLET ORAL at 09:16

## 2025-04-09 RX ADMIN — HYDROCORTISONE ACETATE, PRAMOXINE HCL: 2.5; 1 CREAM TOPICAL at 12:31

## 2025-04-09 RX ADMIN — ASPIRIN 81 MG: 81 TABLET, COATED ORAL at 09:16

## 2025-04-09 RX ADMIN — HYDROCORTISONE ACETATE, PRAMOXINE HCL: 2.5; 1 CREAM TOPICAL at 09:19

## 2025-04-09 RX ADMIN — CLONAZEPAM 0.5 MG: 0.5 TABLET ORAL at 01:41

## 2025-04-09 RX ADMIN — PANTOPRAZOLE SODIUM 40 MG: 40 TABLET, DELAYED RELEASE ORAL at 05:55

## 2025-04-09 RX ADMIN — IPRATROPIUM BROMIDE AND ALBUTEROL SULFATE 1 DOSE: 2.5; .5 SOLUTION RESPIRATORY (INHALATION) at 07:55

## 2025-04-09 RX ADMIN — WATER 10 ML: 1 INJECTION INTRAMUSCULAR; INTRAVENOUS; SUBCUTANEOUS at 01:42

## 2025-04-09 RX ADMIN — EZETIMIBE 10 MG: 10 TABLET ORAL at 09:16

## 2025-04-09 RX ADMIN — DESMOPRESSIN ACETATE 40 MG: 0.2 TABLET ORAL at 09:16

## 2025-04-09 RX ADMIN — BUDESONIDE 1000 MCG: 0.5 SUSPENSION RESPIRATORY (INHALATION) at 07:55

## 2025-04-09 ASSESSMENT — PAIN DESCRIPTION - DESCRIPTORS: DESCRIPTORS: ACHING;DISCOMFORT

## 2025-04-09 ASSESSMENT — PAIN DESCRIPTION - PAIN TYPE: TYPE: ACUTE PAIN

## 2025-04-09 ASSESSMENT — PAIN DESCRIPTION - FREQUENCY: FREQUENCY: CONTINUOUS

## 2025-04-09 ASSESSMENT — PAIN SCALES - GENERAL: PAINLEVEL_OUTOF10: 4

## 2025-04-09 ASSESSMENT — PAIN - FUNCTIONAL ASSESSMENT: PAIN_FUNCTIONAL_ASSESSMENT: PREVENTS OR INTERFERES SOME ACTIVE ACTIVITIES AND ADLS

## 2025-04-09 ASSESSMENT — PAIN DESCRIPTION - ONSET: ONSET: ON-GOING

## 2025-04-09 ASSESSMENT — PAIN DESCRIPTION - LOCATION: LOCATION: GENERALIZED

## 2025-04-09 NOTE — PROGRESS NOTES
Pulmonary Progress Note    Admit Date: 2025  Hospital day                               PCP: Ananth Huff MD    Chief Complaint (s):  Patient Active Problem List   Diagnosis    Aortic stenosis    COPD (chronic obstructive pulmonary disease) ([facility])    Hypertension    H/O hyperlipidemia    CAD (coronary artery disease)    Acute CVA (cerebrovascular accident) ([facility])    Cerebral aneurysm    Intracranial atherosclerosis    Stroke-like symptoms    TIA (transient ischemic attack)    Acute cystitis with hematuria    NINA (acute kidney injury)    Acute cystitis without hematuria    Failure to thrive in adult    Anxiety and depression    Asthma    Chronic back pain    GERD (gastroesophageal reflux disease)    Hyperlipidemia    Somnolence    Anemia requiring transfusions       Subjective:  Patient seen on 3 L nasal cannula. Cough is ongoing and persistent. She admits to some nasal congestion.       Vitals:  VITALS:  BP (!) 168/71   Pulse 86   Temp 97.3 °F (36.3 °C) (Oral)   Resp 18   Ht 1.651 m (5' 5\")   Wt 83.5 kg (184 lb)   SpO2 96%   BMI 30.62 kg/m²     24HR INTAKE/OUTPUT:    No intake or output data in the 24 hours ending 25 1144    24HR PULSE OXIMETRY RANGE:    SpO2  Av %  Min: 93 %  Max: 96 %    Medications:  IV:   sodium chloride      sodium chloride         Scheduled Meds:   lactulose  20 g Oral Daily    methylPREDNISolone  60 mg IntraVENous Q8H    pantoprazole  40 mg Oral BID AC    arformoterol tartrate  15 mcg Nebulization BID RT    ipratropium 0.5 mg-albuterol 2.5 mg  1 Dose Inhalation 4x Daily RT    guaiFENesin  400 mg Oral TID    bisacodyl  10 mg Rectal Once    hydrocortisone-pramoxine   Topical TID    docusate sodium  100 mg Oral BID    sodium chloride flush  10 mL IntraVENous 2 times per day    aspirin  81 mg Oral QAM    atorvastatin  40 mg Oral Daily    budesonide  1,000 mcg Nebulization BID RT    lisinopril  20 mg Oral Daily    clopidogrel  75 mg Oral Daily    ezetimibe  10 
  [facility]  [address], [address]  [place] [address]      Pulmonary Progress Note      SUBJECTIVE:  minimal improvement of sob and cough with minimal sputum production, on 3 li NC, using flutter device when seen.    OBJECTIVE    Medications    Continuous Infusions:   sodium chloride      sodium chloride         Scheduled Meds:   lactulose  20 g Oral BID    methylPREDNISolone  60 mg IntraVENous Q8H    pantoprazole  40 mg Oral BID AC    arformoterol tartrate  15 mcg Nebulization BID RT    ipratropium 0.5 mg-albuterol 2.5 mg  1 Dose Inhalation 4x Daily RT    guaiFENesin  400 mg Oral TID    bisacodyl  10 mg Rectal Once    hydrocortisone-pramoxine   Topical TID    docusate sodium  100 mg Oral BID    sodium chloride flush  10 mL IntraVENous 2 times per day    aspirin  81 mg Oral QAM    atorvastatin  40 mg Oral Daily    budesonide  1,000 mcg Nebulization BID RT    lisinopril  20 mg Oral Daily    clopidogrel  75 mg Oral Daily    ezetimibe  10 mg Oral Daily    furosemide  20 mg Oral QAM    metoprolol tartrate  12.5 mg Oral BID    mirtazapine  15 mg Oral Nightly       PRN Meds:clonazePAM, guaiFENesin-dextromethorphan, sodium chloride, melatonin, sodium chloride flush, sodium chloride, potassium chloride **OR** potassium alternative oral replacement **OR** potassium chloride, magnesium sulfate, ondansetron **OR** ondansetron, senna, acetaminophen **OR** acetaminophen, albuterol, HYDROcodone-acetaminophen    Physical    VITALS:  BP (!) 164/76   Pulse 56   Temp 97.7 °F (36.5 °C) (Oral)   Resp 18   Ht 1.651 m (5' 5\")   Wt 83.5 kg (184 lb)   SpO2 99%   BMI 30.62 kg/m²     24HR INTAKE/OUTPUT:    No intake or output data in the 24 hours ending 25 0831      24HR PULSE OXIMETRY RANGE:    SpO2  Av.8 %  Min: 92 %  Max: 99 %    General appearance: alert, appears stated age, and cooperative  Lungs: rhonchi bilaterally and wheezing bilateral worse 
  [facility]  [address], [address]  [place] [address]      Pulmonary Progress Note      SUBJECTIVE:  still complaining of cough and mod sputum production, occ feeling like she is going to vomit from coughing, similar with sob, on 5 li NC.    OBJECTIVE    Medications    Continuous Infusions:   sodium chloride      sodium chloride         Scheduled Meds:   arformoterol tartrate  15 mcg Nebulization BID RT    methylPREDNISolone  40 mg IntraVENous Q8H    ipratropium 0.5 mg-albuterol 2.5 mg  1 Dose Inhalation 4x Daily RT    guaiFENesin  400 mg Oral TID    bisacodyl  10 mg Rectal Once    hydrocortisone-pramoxine   Topical TID    docusate sodium  100 mg Oral BID    pantoprazole  40 mg IntraVENous BID    sodium chloride flush  10 mL IntraVENous 2 times per day    [Held by provider] aspirin  81 mg Oral QAM    atorvastatin  40 mg Oral Daily    budesonide  1,000 mcg Nebulization BID RT    lisinopril  20 mg Oral Daily    [Held by provider] clopidogrel  75 mg Oral Daily    ezetimibe  10 mg Oral Daily    furosemide  20 mg Oral QAM    metoprolol tartrate  12.5 mg Oral BID    mirtazapine  15 mg Oral Nightly    [Held by provider] pantoprazole  40 mg Oral QAM       PRN Meds:guaiFENesin-dextromethorphan, sodium chloride, melatonin, sodium chloride flush, sodium chloride, potassium chloride **OR** potassium alternative oral replacement **OR** potassium chloride, magnesium sulfate, ondansetron **OR** ondansetron, senna, acetaminophen **OR** acetaminophen, albuterol, clonazePAM, HYDROcodone-acetaminophen    Physical    VITALS:  BP (!) 148/68   Pulse 87   Temp 97.3 °F (36.3 °C) (Oral)   Resp 18   Ht 1.651 m (5' 5\")   Wt 82.1 kg (181 lb)   SpO2 96%   BMI 30.12 kg/m²     24HR INTAKE/OUTPUT:      Intake/Output Summary (Last 24 hours) at 4/4/2025 0979  Last data filed at 4/4/2025 0843  Gross per 24 hour   Intake 600 ml   Output 1000 ml   Net -400 ml       24HR 
4 Eyes Skin Assessment     NAME:  Barbara Hutson  YOB: 1945  MEDICAL RECORD NUMBER:  [id number]    The patient is being assessed for  Admission    I agree that at least one RN has performed a thorough Head to Toe Skin Assessment on the patient. ALL assessment sites listed below have been assessed.      Areas assessed by both nurses:    Head, Face, Ears, Shoulders, Back, Chest, Arms, Elbows, Hands, Sacrum. Buttock, Coccyx, Ischium, Legs. Feet and Heels, and Under Medical Devices         Does the Patient have a Wound? No noted wound(s)       Mark Prevention initiated by RN: No  Wound Care Orders initiated by RN: No    Pressure Injury (Stage 3,4, Unstageable, DTI, NWPT, and Complex wounds) if present, place Wound referral order by RN under : No    New Ostomies, if present place, Ostomy referral order under : No     Nurse 1 eSignature: Electronically signed by Jose Rafael Lima RN on 4/3/25 at 5:13 AM EDT    **SHARE this note so that the co-signing nurse can place an eSignature**    Nurse 2 eSignature: Electronically signed by Charo Shaver RN on 4/3/25 at 5:14 AM EDT  
4 Eyes Skin Assessment     NAME:  Barbara Hutson  YOB: 1945  MEDICAL RECORD NUMBER:  [id number]    The patient is being assessed for  Transfer to New Unit    I agree that at least one RN has performed a thorough Head to Toe Skin Assessment on the patient. ALL assessment sites listed below have been assessed.      Areas assessed by both nurses:    Head, Face, Ears, Shoulders, Back, Chest, Arms, Elbows, Hands, Sacrum. Buttock, Coccyx, Ischium, Legs. Feet and Heels, and Under Medical Devices         Does the Patient have a Wound? No noted wound(s)       Mark Prevention initiated by RN: No  Wound Care Orders initiated by RN: No    Pressure Injury (Stage 3,4, Unstageable, DTI, NWPT, and Complex wounds) if present, place Wound referral order by RN under : No    New Ostomies, if present place, Ostomy referral order under : No     Nurse 1 eSignature: Electronically signed by SNEHA BOLAÑOS RN on 4/8/25 at 10:07 AM EDT    **SHARE this note so that the co-signing nurse can place an eSignature**    Nurse 2 eSignature: Electronically signed by Davina Yu RN on 4/8/25 at 10:09 AM EDT   
Internal Medicine Progress Note    Patient's name: Barbara Hutson  : 1945  Chief complaints (on day of admission): Shortness of Breath and Dizziness  Admission date: 2025  Date of service: 2025   Room: [address] SURG  Primary care physician: Ananth Huff MD  Reason for visit: Follow-up for anemia, COPD exacerbation    Subjective  Barbara is seen lying in bed asleep, in no distress. She does easily awaken to voice. She denies any nausea or vomiting. Still feels like her breathing is \"off\". Coughing a lot but mucous is thick and isn't coming up easily. She feel short of breath with activity and has ongoing chest congestion. Denies any fever or chills. Denies any nausea or vomiting. She had several bowel movements after receiving the Lactulose, asking if she still needs to take it. She is asking about how much longer she has to stay in the hospital. No other issues or concerns from nursing.    Review of Systems  Full 10 point review of systems negative unless mentioned above.    Hospital Medications  Current Facility-Administered Medications   Medication Dose Route Frequency Provider Last Rate Last Admin    lactulose (CHRONULAC) 10 GM/15ML solution 20 g  20 g Oral BID Arlyn Barber APRN - CNP   20 g at 25 0842    methylPREDNISolone sodium succ (SOLU-MEDROL) injection 60 mg  60 mg IntraVENous Q8H Trev Zuniga MD   60 mg at 25 2331    pantoprazole (PROTONIX) tablet 40 mg  40 mg Oral BID AC Bo Parham PA-C   40 mg at 25 0530    clonazePAM (KLONOPIN) tablet 0.5 mg  0.5 mg Oral Q12H PRN Georges Coto MD   0.5 mg at 25    arformoterol tartrate (BROVANA) nebulizer solution 15 mcg  15 mcg Nebulization BID RT Gordo Valadez MD   15 mcg at 25    ipratropium 0.5 mg-albuterol 2.5 mg (DUONEB) nebulizer solution 1 Dose  1 Dose Inhalation 4x Daily RT Gordo Valadez MD   1 Dose at 25    guaiFENesin-dextromethorphan (ROBITUSSIN DM) 100-10 
Internal Medicine Progress Note    Patient's name: Barbara Hutson  : 1945  Chief complaints (on day of admission): Shortness of Breath and Dizziness  Admission date: 2025  Date of service: 2025   Room: [address] SURG  Primary care physician: Ananth Huff MD  Reason for visit: Follow-up for copd exac    Subjective  Barbara was seen and examined. Starting to breathe better.  Complaining of continued diminished appetite.  Patient denies any fevers or chills overnight.  No nausea or vomiting.        Review of Systems  There are no new complaints of chest pain, shortness of breath, abdominal pain, nausea, vomiting, diarrhea, constipation.    Hospital Medications  Current Facility-Administered Medications   Medication Dose Route Frequency Provider Last Rate Last Admin    arformoterol tartrate (BROVANA) nebulizer solution 15 mcg  15 mcg Nebulization BID RT Gordo Valadez MD   15 mcg at 25 0843    methylPREDNISolone sodium (PF) (SOLU-MEDROL PF) injection 40 mg  40 mg IntraVENous Q8H Gordo Valadez MD   40 mg at 25 0736    ipratropium 0.5 mg-albuterol 2.5 mg (DUONEB) nebulizer solution 1 Dose  1 Dose Inhalation 4x Daily RT Gordo Valadez MD   1 Dose at 25 1240    guaiFENesin-dextromethorphan (ROBITUSSIN DM) 100-10 MG/5ML syrup 10 mL  10 mL Oral Q6H PRN Maribeth Brooks APRN - CNP   10 mL at 25 1115    guaiFENesin tablet 400 mg  400 mg Oral TID Maribeth Brooks APRN - CNP   400 mg at 25 0736    bisacodyl (DULCOLAX) suppository 10 mg  10 mg Rectal Once SugarTana APRN - CNP        hydrocortisone-pramoxine 2.5-1 % cream   Topical TID SugarTana APRN - CNP   Given at 25 0737    docusate sodium (COLACE) capsule 100 mg  100 mg Oral BID SugarTana APRN - CNP   100 mg at 25 0736    0.9 % sodium chloride infusion   IntraVENous PRN Ned Morley DO        melatonin tablet 3 mg  3 mg Oral Nightly PRN Vicente Taylor DO        pantoprazole (PROTONIX) 
Nurse to nurse called to [facility] at [facility].  Papers faxed to [phone].  Electronically signed by Rebeca Matias RN on 4/9/2025 at 12:07 PM    
Occupational Therapy    OCCUPATIONAL THERAPY INITIAL EVALUATION    [facility]   [address], [place]         Date:4/3/2025                                                  Patient Name: Barbara Hutson    MRN: [id number]    : 1945    Room: [address] [place] [address]      Evaluating OT: Tamra Hatch OTR/L   [id number]      Referring Provider:Vicente Taylor DO     Specific Provider Orders/Date:OT eval and treat 2025      Diagnosis:  Anemia requiring transfusions [D64.9]  Chronic obstructive pulmonary disease, unspecified COPD type (HCC) [J44.9]     Pertinent Medical History: asthma, CHF, chronic back pain, TIA, anxiety,      Precautions:  Fall Risk, O2, DROPLET      Assessment of current deficits    [x] Functional mobility  [x]ADLs  [x] Strength               [x]Cognition    [x] Functional transfers   [x] IADLs         [x] Safety Awareness   [x]Endurance    [x] Fine Coordination              [x] Balance      [] Vision/perception   [x]Sensation     []Gross Motor Coordination  [] ROM  [] Delirium                   [] Motor Control     OT PLAN OF CARE   OT POC based on physician orders, patient diagnosis and results of clinical assessment    Frequency/Duration  2-3 days/wk for  PRN   Specific OT Treatment Interventions to include:   ADL retraining/adapted techniques and AE recommendations to increase functional independence within precautions                    Energy conservation techniques to improve tolerance for selfcare routine   Functional transfer/mobility training/DME recommendations for increased independence, safety and fall prevention         Patient/family education to increase safety and functional independence             Environmental modifications for safe mobility and completion of ADLs                             Therapeutic activity to improve functional performance during ADLs.                                         Therapeutic exercise to 
Occupational Therapy  OT BEDSIDE TREATMENT NOTE      Date:2025  Patient Name: Barbara Hutson  MRN: [id number]  : 1945  Room: [address] [place], [place] [address]         Evaluating OT: Tamra Hatch OTR/L   [id number]       Referring Provider:Vicente Taylor DO     Specific Provider Orders/Date:OT eval and treat 2025       Diagnosis:  Anemia requiring transfusions [D64.9]  Chronic obstructive pulmonary disease, unspecified COPD type (HCC) [J44.9]     Pertinent Medical History: asthma, CHF, chronic back pain, TIA, anxiety,      Precautions:  Fall Risk, O2, DROPLET       Assessment of current deficits    [x] Functional mobility            [x]ADLs           [x] Strength                  [x]Cognition    [x] Functional transfers          [x] IADLs         [x] Safety Awareness   [x]Endurance    [x] Fine Coordination                         [x] Balance      [] Vision/perception   [x]Sensation      []Gross Motor Coordination             [] ROM           [] Delirium                   [] Motor Control      OT PLAN OF CARE   OT POC based on physician orders, patient diagnosis and results of clinical assessment     Frequency/Duration  2-3 days/wk for  PRN   Specific OT Treatment Interventions to include:   ADL retraining/adapted techniques and AE recommendations to increase functional independence within precautions                    Energy conservation techniques to improve tolerance for selfcare routine   Functional transfer/mobility training/DME recommendations for increased independence, safety and fall prevention         Patient/family education to increase safety and functional independence             Environmental modifications for safe mobility and completion of ADLs                             Therapeutic activity to improve functional performance during ADLs.                                         Therapeutic exercise to improve tolerance and functional strength for ADLs    Balance retraining/tolerance tasks for facilitation of 
PROGRESS NOTE    Patient Presents with/Seen in Consultation For      Reason for Consult: GI bleed      CHIEF COMPLAINT: Dizziness.  Complains of abdominal pain, left lower quad    Subjective:     Patient seen laying in bed.  NAD.  Continues to report abdominal discomfort which radiates to her back.  Pain is described as sharp which she rates as 10/10 denies any alleviating or worsening factors.  Admits to heartburn.  Reports she is passing gas had a small bowel movement yesterday with BRBPR.  Admits that Anusol cream is relieving some rectal discomfort.  Plan of care discussed with patient.     Review of Systems  Aside from what was mentioned in the PMH and HPI, essentially unremarkable, all others negative.    Objective:     Patient Vitals for the past 8 hrs:   BP Temp Temp src Pulse Resp SpO2   04/05/25 0844 (!) 145/63 -- -- 80 18 94 %   04/05/25 0659 (!) 145/63 97.6 °F (36.4 °C) Oral 51 16 93 %       General appearance: alert, awake, laying in bed, and cooperative  Eyes: conjunctivae/corneas clear. PERRL.  Lungs: Coarse, inspiratory/expiratory wheeze to auscultation bilaterally.  O2 per nasal cannula.  Harsh cough noted  Heart: regular rate and rhythm, no murmur, 2+ pulses; trace edema  Abdomen: soft, non-tender; bowel sounds normal; no masses,  no organomegaly  Extremities: extremities with trace edema  Pulses: 2+ and symmetric  Skin: Skin color, texture, turgor normal.   Neurologic: Grossly normal    [Held by provider] pantoprazole (PROTONIX) tablet 40 mg, BID AC  clonazePAM (KLONOPIN) tablet 0.5 mg, Q12H PRN  arformoterol tartrate (BROVANA) nebulizer solution 15 mcg, BID RT  methylPREDNISolone sodium (PF) (SOLU-MEDROL PF) injection 40 mg, Q8H  ipratropium 0.5 mg-albuterol 2.5 mg (DUONEB) nebulizer solution 1 Dose, 4x Daily RT  guaiFENesin-dextromethorphan (ROBITUSSIN DM) 100-10 MG/5ML syrup 10 mL, Q6H PRN  guaiFENesin tablet 400 mg, TID  bisacodyl (DULCOLAX) suppository 10 mg, Once  hydrocortisone-pramoxine 
PROGRESS NOTE    Patient Presents with/Seen in Consultation For      Reason for Consult: GI bleed      CHIEF COMPLAINT: Dizziness.  Complains of abdominal pain, left lower quad    Subjective:     Patient seen laying in bed.  Respiratory working with patient.  States she feels well overall no complaints.  Slight wheeze noted.  Tolerating diet.  Denies any nausea vomiting and/or pain.  Last bowel movement 3 days ago.  Patient refusing anything beyond stool softeners at this time for bowel aid.    Review of Systems  Aside from what was mentioned in the PMH and HPI, essentially unremarkable, all others negative.    Objective:     Patient Vitals for the past 8 hrs:   BP Temp Temp src Pulse Resp SpO2   04/03/25 1502 (!) 122/46 98.2 °F (36.8 °C) Oral 94 18 95 %   04/03/25 1309 -- -- -- -- 18 --   04/03/25 1107 (!) 126/46 97.7 °F (36.5 °C) Oral 82 22 95 %       General appearance: alert, awake, laying in bed, and cooperative  Eyes: conjunctivae/corneas clear. PERRL.  Lungs: Coarse, expiratory wheeze to auscultation bilaterally.  O2 per nasal cannula  Heart: regular rate and rhythm, no murmur, 2+ pulses; trace edema  Abdomen: soft, non-tender; bowel sounds normal; no masses,  no organomegaly  Extremities: extremities with trace edema  Pulses: 2+ and symmetric  Skin: Skin color, texture, turgor normal.   Neurologic: Grossly normal    arformoterol tartrate (BROVANA) nebulizer solution 15 mcg, BID RT  methylPREDNISolone sodium (PF) (SOLU-MEDROL PF) injection 40 mg, Q8H  ipratropium 0.5 mg-albuterol 2.5 mg (DUONEB) nebulizer solution 1 Dose, 4x Daily RT  guaiFENesin-dextromethorphan (ROBITUSSIN DM) 100-10 MG/5ML syrup 10 mL, Q6H PRN  guaiFENesin tablet 400 mg, TID  bisacodyl (DULCOLAX) suppository 10 mg, Once  hydrocortisone-pramoxine 2.5-1 % cream, TID  docusate sodium (COLACE) capsule 100 mg, BID  0.9 % sodium chloride infusion, PRN  melatonin tablet 3 mg, Nightly PRN  pantoprazole (PROTONIX) injection 40 mg, BID  sodium 
PROGRESS NOTE    Patient Presents with/Seen in Consultation For      Reason for Consult: GI bleed      CHIEF COMPLAINT: Dizziness.  Complains of abdominal pain, left lower quad    Subjective:     Patient seen sitting on edge of bed in no apparent distress.  Reports mild intermittent epigastric discomfort.  Had bowel movement, brown with scant bright red blood on tissue.  Hemoglobin stable.  Tolerating diet.  Plan of care discussed with patient.    Review of Systems  Aside from what was mentioned in the PMH and HPI, essentially unremarkable, all others negative.    Objective:     Patient Vitals for the past 8 hrs:   BP Temp Temp src Pulse Resp SpO2   04/08/25 0731 (!) 177/95 97.9 °F (36.6 °C) Axillary 71 18 96 %   04/08/25 0358 (!) 163/74 97.7 °F (36.5 °C) Oral 79 18 96 %       General appearance: alert, awake, sitting up in bed, and cooperative  Eyes: conjunctivae/corneas clear. PERRL.  Lungs: Coarse, inspiratory/expiratory wheeze to auscultation bilaterally.  O2 per nasal cannula.  Harsh cough noted  Heart: regular rate and rhythm, no murmur, 2+ pulses; trace edema  Abdomen: soft, non-tender; bowel sounds normal; no masses,  no organomegaly  Extremities: extremities with trace edema  Pulses: 2+ and symmetric  Skin: Skin color, texture, turgor normal.   Neurologic: Grossly normal    lactulose (CHRONULAC) 10 GM/15ML solution 20 g, Daily  methylPREDNISolone sodium succ (SOLU-MEDROL) injection 60 mg, Q8H  pantoprazole (PROTONIX) tablet 40 mg, BID AC  clonazePAM (KLONOPIN) tablet 0.5 mg, Q12H PRN  arformoterol tartrate (BROVANA) nebulizer solution 15 mcg, BID RT  ipratropium 0.5 mg-albuterol 2.5 mg (DUONEB) nebulizer solution 1 Dose, 4x Daily RT  guaiFENesin-dextromethorphan (ROBITUSSIN DM) 100-10 MG/5ML syrup 10 mL, Q6H PRN  guaiFENesin tablet 400 mg, TID  bisacodyl (DULCOLAX) suppository 10 mg, Once  hydrocortisone-pramoxine 2.5-1 % cream, TID  docusate sodium (COLACE) capsule 100 mg, BID  0.9 % sodium chloride 
Patient educated on stool softners and supp ordered for today. OK to take stool softener but declined the supp. Stated having explosive loose stools and will wait for now.   
Spiritual Health History and Assessment/Progress Note  [facility]    Initial Encounter, Attempted Encounter,  ,  ,      Name: Barbara Hutson MRN: [id number]    Age: 79 y.o.     Sex: female   Language: English   Nondenominational: [employer]   Anemia requiring transfusions     Date: 4/3/2025                           [facility]        Referral/Consult From: Rounding   Encounter Overview/Reason: Initial Encounter, Attempted Encounter  Service Provided For: Patient, Patient not available    Danyelle, Belief, Meaning:   Patient is connected with a danyelle tradition or spiritual practice  Family/Friends No family/friends present      Importance and Influence:  Patient unable to assess at this time  Family/Friends No family/friends present    Community:  Patient feels well-supported. Support system includes: Children and Extended family  Family/Friends No family/friends present    Assessment and Plan of Care:     Patient Interventions include: Other: Staff addressing the patient, prayer silently offered for the patient at the time.  Family/Friends Interventions include: No family/friends present    Patient Plan of Care: Spiritual Care available upon further referral  Family/Friends Plan of Care: No family/friends present    Electronically signed by Chaplain Lashell on 4/3/2025 at 2:07 PM    
Spiritual Health History and Assessment/Progress Note  [facility]     Encounter, Rituals, Rites and Sacraments,  ,  ,      Name: Barbara Hutson MRN: [id number]    Age: 79 y.o.     Sex: female   Language: English   Lutheran: Denominational   Anemia requiring transfusions     Date: 4/4/2025                           Spiritual Assessment began in [facility] MED SURG        Referral/Consult From: Rounding   Encounter Overview/Reason:  Encounter, Rituals, Rites and Sacraments  Service Provided For: Patient    Danyelle, Belief, Meaning:   Patient is connected with a danyelle tradition or spiritual practice  Family/Friends No family/friends present      Importance and Influence:  Patient has no beliefs influential to healthcare decision-making identified during this visit  Family/Friends No family/friends present    Community:  Patient feels well-supported. Support system includes: Children  Family/Friends No family/friends present    Assessment and Plan of Care:     Patient Interventions include: Facilitated expression of thoughts and feelings, Affirmed coping skills/support systems, and Provided sacramental/Gnosticist ritual  Family/Friends Interventions include: No family/friends present    Patient Plan of Care: Spiritual Care available upon further referral  Family/Friends Plan of Care: Spiritual Care available upon further referral    Electronically signed by Chaplain Jazmyne on 4/4/2025 at 3:59 PM   
1 Dose at 04/05/25 2018    guaiFENesin-dextromethorphan (ROBITUSSIN DM) 100-10 MG/5ML syrup 10 mL  10 mL Oral Q6H PRN Maribeth Brooks APRN - CNP   10 mL at 04/05/25 2030    guaiFENesin tablet 400 mg  400 mg Oral TID Maribeth Brooks APRN - CNP   400 mg at 04/05/25 2030    bisacodyl (DULCOLAX) suppository 10 mg  10 mg Rectal Once Tana Martin APRN - JU        hydrocortisone-pramoxine 2.5-1 % cream   Topical TID Tana Martin APRN - CNP   Given at 04/05/25 2035    docusate sodium (COLACE) capsule 100 mg  100 mg Oral BID Tana Martin APRN - CNP   100 mg at 04/05/25 2030    0.9 % sodium chloride infusion   IntraVENous PRN Ned Morley DO        melatonin tablet 3 mg  3 mg Oral Nightly PRN Vicente Taylor, DO   3 mg at 04/05/25 2030    sodium chloride flush 0.9 % injection 10 mL  10 mL IntraVENous 2 times per day Vicente Taylor E, DO   10 mL at 04/05/25 2030    sodium chloride flush 0.9 % injection 10 mL  10 mL IntraVENous PRN Vicente Taylor, DO        0.9 % sodium chloride infusion   IntraVENous PRN Vicente Taylor, DO        potassium chloride (KLOR-CON M) extended release tablet 40 mEq  40 mEq Oral PRN Vicente Taylor, DO        Or    potassium bicarb-citric acid (EFFER-K) effervescent tablet 40 mEq  40 mEq Oral PRN Vicente Taylor, DO        Or    potassium chloride 10 mEq/100 mL IVPB (Peripheral Line)  10 mEq IntraVENous PRN Vicente Taylor, DO        magnesium sulfate 2000 mg in 50 mL IVPB premix  2,000 mg IntraVENous PRN Vicente Taylor, DO        ondansetron (ZOFRAN-ODT) disintegrating tablet 4 mg  4 mg Oral Q8H PRN Vicente Taylor,         Or    ondansetron (ZOFRAN) injection 4 mg  4 mg IntraVENous Q6H PRN Vicente Taylor, DO   4 mg at 04/05/25 0646    senna (SENOKOT) tablet 8.6 mg  1 tablet Oral Daily PRN Volino, Vicente E, DO        acetaminophen (TYLENOL) tablet 650 mg  650 mg Oral Q6H PRN Vicente Taylor DO        Or    acetaminophen (TYLENOL) suppository 650 mg  650 mg Rectal Q6H PRN Vicente Taylor 
Oral QAM    metoprolol tartrate  12.5 mg Oral BID    mirtazapine  15 mg Oral Nightly       Diet:   ADULT DIET; Regular     EXAM:  General: No distress. Alert.  Eyes: PERRL. No sclera icterus. No conjunctival injection.  ENT: No discharge. Pharynx clear.  Neck: Trachea midline. Normal thyroid.  Resp: No accessory muscle use.  No rales.  Diffuse wheezing.  Scattered rhonchi.   CV: Regular rate. Regular rhythm. No murmur or rub.   Abd: Non-tender. Non-distended. No masses. No organomegaly. Normal bowel sounds.   Skin: Warm and dry. No nodule on exposed extremities. No rash on exposed extremities.  Ext: No cyanosis, clubbing, edema  Lymph: No cervical LAD. No supraclavicular LAD.   M/S: No cyanosis. No joint deformity. No clubbing.   Neuro: Awake. Follows commands. Positive pupils/gag/corneals. Normal pain response.       Results:  CBC:   Recent Labs     04/04/25 0415 04/05/25 0230 04/06/25  0355   WBC 17.1* 16.1* 12.9*   HGB 11.8 12.4 12.9   HCT 36.5 37.8 39.2   MCV 96.1 94.7 94.2    323 363     BMP:   Recent Labs     04/04/25 0415 04/05/25 0230 04/06/25  0355    137 138   K 4.4 4.6 4.8    99 98   CO2 27 27 28   BUN 34* 36* 33*   CREATININE 1.2* 1.1* 1.1*     LIVER PROFILE:   Recent Labs     04/04/25 0415 04/05/25 0230 04/06/25  0355   AST 22 18 18   ALT 18 21 24   BILITOT 0.5 0.4 0.4   ALKPHOS 84 80 84     PT/INR: No results for input(s): \"PROTIME\", \"INR\" in the last 72 hours.  APTT: No results for input(s): \"APTT\" in the last 72 hours.      Pathology:  N/A      Microbiology:  None    Recent ABG:   No results for input(s): \"PH\", \"PO2\", \"PCO2\", \"HCO3\", \"BE\", \"O2SAT\", \"METHB\", \"O2HB\", \"COHB\", \"O2CON\", \"HHB\", \"THB\" in the last 72 hours.          Recent Films:  XR ABDOMEN (KUB) (SINGLE AP VIEW)   Final Result   Colonic fecal retention.      No evidence of bowel obstruction.         XR CHEST PORTABLE   Final Result   Cardiomegaly with mild interstitial edema pattern.             Assessment:  Acute 
pov      Time at the bedside, reviewing labs and radiographs, reviewing notes and consultations, discussing with staff and family was more than 50 minutes.      Thanks for letting us see this patient in consultation.  Please contact us with any questions. Office [phone] or after hours through "[employer], x [phone].    
Complexity PT evaluation 97582  [] High Complexity PT evaluation 69890  [] PT Re-evaluation 93161  [] Gait training 05130  minutes  [] Therapeutic activities 34716  minutes  [] Therapeutic exercises 10793  minutes  [] Neuromuscular reeducation 63547  minutes       Stacie Bustillo  License number:  [id number]         
daily  Lactulose as ordered  Continue Analpram cream as ordered  Medical management per primary team  Ok to d/c from GI POV, when ok with others      Note: This report was completed utilizing computer voice recognition software. Every effort has been made to ensure accuracy, however; inadvertent computerized transcription errors may be present.     Discussed with Dr. Grier  Plan per Dr. Marvel Barber, APRN - CNP 4/6/2025 8:00 AM For Dr. Grier    GI attending addendum:  The patient case was reviewed and discussed with the GI midlevel team. OK to d/c from a GI POV with plans for outpt endoscopies.     Jerome Grier, DO    
history of polyps 2016,  and family history.  EGD and colonoscopy to be completed together as outpatient, office to arrange  Pulmonary following  Ok for anticoagulants/antiplatelets   Colace 100 mg twice daily  Lactulose as ordered  Continue Analpram cream as ordered  Medical management per primary team  Ok to d/c from GI POV, when ok with others      Note: This report was completed utilizing computer voice recognition software. Every effort has been made to ensure accuracy, however; inadvertent computerized transcription errors may be present.     Discussed with Dr. Grier  Plan per Dr. Marvel Bates, APRN - CNP 4/7/2025 9:57 AM For Dr. Grier    GI attending addendum:  The patient case was reviewed and discussed with the GI midlevel team.     Jerome Grier, DO    
  Essential Hypertension  Continue Lisinopril and Lopressor  Monitor BP trends and adjust as indicated     Anxiety/Depression/Dementia  Continue medications as at home  Monitor mentation  Supportive care     Hx CVA   ASA/Plavix on hold due to concerns for GIB -- resumed 4/4  Statin     Generalized Weakness  PT AM-PAC-- 17/24  OT AM-PAC-- 17/24   following for discharge planning -- back to SNF     PT AM-PAC--pending  Follow labs   DVT prophylaxis  Please see orders for further management and care.  Discharge plan: Patient to return to facility when ok with pulmonology and GI    Pertinent details of this case discussed with Dr. Barroso    Electronically signed by IMTIAZ Beltrán CNP on 4/5/2025 at 7:27 AM    
need EGD/Colonoscopy as outpatient     Acute COPD Exacerbation in the setting of Human Metapneumovirus URI with Chronic Hypoxia  CXR noted  Continue bronchodilators  IV steroids -- taper as per Pulmonary  Mucinex TID  Encourage IS/flutter valve and increased activity  Continue supplemental oxygen and wean to maintain SpO2 >90% -- currently on 3L NC which is her baseline  Pulmonary consult appreciated     Essential Hypertension  Continue Lisinopril and Lopressor  Monitor BP trends and adjust as indicated     Anxiety/Depression/Dementia  Continue medications as at home  Monitor mentation  Supportive care     Constipation  Continue bowel regimen as per GI  Did have several BMs 4/6    Hx CVA   ASA/Plavix resumed 4/4 given no evidence of active bleeding  Statin     Generalized Weakness  PT AM-PAC-- 17/24  OT AM-PAC-- 17/24   following for discharge planning -- back to SNF     Follow labs   DVT prophylaxis  Please see orders for further management and care.  Discharge plan: back to SNF when ok with Pulmonary -- possibly later today or tomorrow    The pertinent details of this case were discussed with Dr. Taylor.    Electronically signed by IMTIAZ Sharma CNP on 4/8/2025 at 7:23 AM    
for discharge from pulmonary point of view  Hemoglobin remained stable  Outpatient follow-up with GI and pulmonology will be needed as well as her PCP    Electronically signed by Vicente Taylor DO on 4/9/2025 at 8:22 AM    I can be reached through [employer].

## 2025-04-09 NOTE — PLAN OF CARE
Problem: Chronic Conditions and Co-morbidities  Goal: Patient's chronic conditions and co-morbidity symptoms are monitored and maintained or improved  4/9/2025 1042 by Rebeca Matias RN  Outcome: Progressing     Problem: Discharge Planning  Goal: Discharge to home or other facility with appropriate resources  4/9/2025 1042 by Rebeca Matias RN  Outcome: Progressing     Problem: Safety - Adult  Goal: Free from fall injury  4/9/2025 1042 by Rebeca Matias RN  Outcome: Progressing

## 2025-04-09 NOTE — CARE COORDINATION
Case Management... Patient is from [facility], discharge plan is to return when medically stable. Patient is a Medicaid bed hold, no auth required to return. JULIA initiated, envelope with demos and transport form in chart. Pulmonology following, on IV steroids.   Electronically signed by Desire Washington RN on 4/9/2025 at 8:16 AM    UPDATE: Discharge order placed. Physicians ambulette set up for [phone]. Nursing, family, and facility notified.   Electronically signed by Desire Washington RN on 4/9/2025 at 9:03 AM

## 2025-04-09 NOTE — DISCHARGE SUMMARY
Caps  Commonly known as: COLACE, DULCOLAX  Take 100 mg by mouth 2 times daily     lactulose 10 GM/15ML solution  Commonly known as: CHRONULAC  Take 30 mLs by mouth daily     predniSONE 10 MG tablet  Commonly known as: DELTASONE  Take 4 tabs x 3 days, 3 tabs x 3 days, 2 tabs x 3 days, 1 tab x 3 days, stop.            CHANGE how you take these medications      ascorbic acid 500 MG tablet  Commonly known as: VITAMIN C  Take 1 tablet by mouth daily  What changed: when to take this     atorvastatin 40 MG tablet  Commonly known as: LIPITOR  Take 1 tablet by mouth daily  What changed: when to take this     clopidogrel 75 MG tablet  Commonly known as: PLAVIX  Take 1 tablet by mouth daily  What changed: when to take this     ezetimibe 10 MG tablet  Commonly known as: ZETIA  Take 1 tablet by mouth daily  What changed: when to take this            CONTINUE taking these medications      acetaminophen 500 MG tablet  Commonly known as: TYLENOL     * albuterol sulfate  (90 Base) MCG/ACT inhaler  Commonly known as: PROVENTIL;VENTOLIN;PROAIR     * albuterol (2.5 MG/3ML) 0.083% nebulizer solution  Commonly known as: PROVENTIL     aspirin 81 MG EC tablet     benazepril 20 MG tablet  Commonly known as: LOTENSIN     budesonide 0.5 MG/2ML nebulizer suspension  Commonly known as: PULMICORT  Take 4 mLs by nebulization in the morning and 4 mLs in the evening.     clonazePAM 1 MG tablet  Commonly known as: KLONOPIN  Take 1 tablet by mouth every 12 hours as needed for Anxiety for up to 3 days. Max Daily Amount: 2 mg     Dextromethorphan-guaiFENesin  MG/5ML Syrp     furosemide 20 MG tablet  Commonly known as: LASIX     HYDROcodone-acetaminophen 5-325 MG per tablet  Commonly known as: NORCO  Take 1 tablet by mouth every 6 hours as needed for Pain for up to 3 days. Max Daily Amount: 4 tablets     hydrocortisone 1 % cream     loperamide 2 MG capsule  Commonly known as: IMODIUM     metoprolol tartrate 25 MG tablet  Commonly known as:

## 2025-04-09 NOTE — PATIENT CARE CONFERENCE
[facility] Quality Flow/Interdisciplinary Rounds Progress Note        Quality Flow Rounds held on April 9, 2025    Disciplines Attending:  Bedside Nurse, , , and Nursing Unit Leadership    Barbara Hutson was admitted on 4/2/2025  1:08 PM    Anticipated Discharge Date:       Disposition:    Mark Score:  Mark Scale Score: 20    [facility] RISK OF UNPLANNED READMISSION 2.0             19.8 Total Score        Discussed patient goal for the day, patient clinical progression, and barriers to discharge.  The following Goal(s) of the Day/Commitment(s) have been identified:  Diagnostics - Report Results      Cheryl Hopper RN  April 9, 2025

## 2025-05-12 ENCOUNTER — HOSPITAL ENCOUNTER (EMERGENCY)
Age: 80
Discharge: HOME | End: 2025-05-13
Attending: EMERGENCY MEDICINE
Payer: MEDICAID

## 2025-05-12 ENCOUNTER — APPOINTMENT (OUTPATIENT)
Dept: GENERAL RADIOLOGY | Age: 80
End: 2025-05-12
Payer: MEDICAID

## 2025-05-12 ENCOUNTER — APPOINTMENT (OUTPATIENT)
Dept: CT IMAGING | Age: 80
End: 2025-05-12
Payer: MEDICAID

## 2025-05-12 ENCOUNTER — APPOINTMENT (OUTPATIENT)
Dept: ULTRASOUND IMAGING | Age: 80
End: 2025-05-12
Payer: MEDICAID

## 2025-05-12 DIAGNOSIS — R60.0 BILATERAL LOWER EXTREMITY EDEMA: Primary | ICD-10-CM

## 2025-05-12 LAB
ALBUMIN SERPL-MCNC: 3.8 G/DL (ref 3.5–5.2)
ALP SERPL-CCNC: 89 U/L (ref 35–104)
ALT SERPL-CCNC: 15 U/L (ref 0–32)
ANION GAP SERPL CALCULATED.3IONS-SCNC: 8 MMOL/L (ref 7–16)
AST SERPL-CCNC: 15 U/L (ref 0–31)
BASOPHILS # BLD: 0.08 K/UL (ref 0–0.2)
BASOPHILS NFR BLD: 1 % (ref 0–2)
BILIRUB SERPL-MCNC: 0.4 MG/DL (ref 0–1.2)
BILIRUB UR QL STRIP: NEGATIVE
BNP SERPL-MCNC: 321 PG/ML (ref 0–450)
BUN SERPL-MCNC: 18 MG/DL (ref 6–23)
CALCIUM SERPL-MCNC: 9.8 MG/DL (ref 8.6–10.2)
CHLORIDE SERPL-SCNC: 98 MMOL/L (ref 98–107)
CLARITY UR: CLEAR
CO2 SERPL-SCNC: 31 MMOL/L (ref 22–29)
COLOR UR: YELLOW
CREAT SERPL-MCNC: 1 MG/DL (ref 0.5–1)
EKG ATRIAL RATE: 60 BPM
EKG P AXIS: 35 DEGREES
EKG P-R INTERVAL: 152 MS
EKG Q-T INTERVAL: 440 MS
EKG QRS DURATION: 72 MS
EKG QTC CALCULATION (BAZETT): 440 MS
EKG R AXIS: 22 DEGREES
EKG T AXIS: 49 DEGREES
EKG VENTRICULAR RATE: 60 BPM
EOSINOPHIL # BLD: 0.1 K/UL (ref 0.05–0.5)
EOSINOPHILS RELATIVE PERCENT: 1 % (ref 0–6)
ERYTHROCYTE [DISTWIDTH] IN BLOOD BY AUTOMATED COUNT: 14.4 % (ref 11.5–15)
GFR, ESTIMATED: 59 ML/MIN/1.73M2
GLUCOSE SERPL-MCNC: 104 MG/DL (ref 74–99)
GLUCOSE UR STRIP-MCNC: NEGATIVE MG/DL
HCT VFR BLD AUTO: 33.1 % (ref 34–48)
HGB BLD-MCNC: 10.4 G/DL (ref 11.5–15.5)
HGB UR QL STRIP.AUTO: ABNORMAL
IMM GRANULOCYTES # BLD AUTO: 0.06 K/UL (ref 0–0.58)
IMM GRANULOCYTES NFR BLD: 1 % (ref 0–5)
KETONES UR STRIP-MCNC: NEGATIVE MG/DL
LEUKOCYTE ESTERASE UR QL STRIP: NEGATIVE
LYMPHOCYTES NFR BLD: 1.21 K/UL (ref 1.5–4)
LYMPHOCYTES RELATIVE PERCENT: 15 % (ref 20–42)
MCH RBC QN AUTO: 31.2 PG (ref 26–35)
MCHC RBC AUTO-ENTMCNC: 31.4 G/DL (ref 32–34.5)
MCV RBC AUTO: 99.4 FL (ref 80–99.9)
MONOCYTES NFR BLD: 0.63 K/UL (ref 0.1–0.95)
MONOCYTES NFR BLD: 8 % (ref 2–12)
NEUTROPHILS NFR BLD: 75 % (ref 43–80)
NEUTS SEG NFR BLD: 6.11 K/UL (ref 1.8–7.3)
NITRITE UR QL STRIP: NEGATIVE
PH UR STRIP: 6 [PH] (ref 5–8)
PLATELET # BLD AUTO: 409 K/UL (ref 130–450)
PMV BLD AUTO: 9.7 FL (ref 7–12)
POTASSIUM SERPL-SCNC: 4 MMOL/L (ref 3.5–5)
PROT SERPL-MCNC: 6.3 G/DL (ref 6.4–8.3)
PROT UR STRIP-MCNC: NEGATIVE MG/DL
RBC # BLD AUTO: 3.33 M/UL (ref 3.5–5.5)
RBC #/AREA URNS HPF: ABNORMAL /HPF
SODIUM SERPL-SCNC: 137 MMOL/L (ref 132–146)
SP GR UR STRIP: <1.005 (ref 1–1.03)
TROPONIN I SERPL HS-MCNC: 21 NG/L (ref 0–14)
TROPONIN I SERPL HS-MCNC: 22 NG/L (ref 0–14)
UROBILINOGEN UR STRIP-ACNC: 0.2 EU/DL (ref 0–1)
WBC #/AREA URNS HPF: ABNORMAL /HPF
WBC OTHER # BLD: 8.2 K/UL (ref 4.5–11.5)

## 2025-05-12 PROCEDURE — 83880 ASSAY OF NATRIURETIC PEPTIDE: CPT

## 2025-05-12 PROCEDURE — 81001 URINALYSIS AUTO W/SCOPE: CPT

## 2025-05-12 PROCEDURE — 84484 ASSAY OF TROPONIN QUANT: CPT

## 2025-05-12 PROCEDURE — 93010 ELECTROCARDIOGRAM REPORT: CPT | Performed by: INTERNAL MEDICINE

## 2025-05-12 PROCEDURE — 6370000000 HC RX 637 (ALT 250 FOR IP)

## 2025-05-12 PROCEDURE — 71045 X-RAY EXAM CHEST 1 VIEW: CPT

## 2025-05-12 PROCEDURE — 93005 ELECTROCARDIOGRAM TRACING: CPT

## 2025-05-12 PROCEDURE — 96374 THER/PROPH/DIAG INJ IV PUSH: CPT

## 2025-05-12 PROCEDURE — 85025 COMPLETE CBC W/AUTO DIFF WBC: CPT

## 2025-05-12 PROCEDURE — 80053 COMPREHEN METABOLIC PANEL: CPT

## 2025-05-12 PROCEDURE — 6360000002 HC RX W HCPCS: Performed by: EMERGENCY MEDICINE

## 2025-05-12 PROCEDURE — 36415 COLL VENOUS BLD VENIPUNCTURE: CPT

## 2025-05-12 PROCEDURE — 71275 CT ANGIOGRAPHY CHEST: CPT

## 2025-05-12 PROCEDURE — 99285 EMERGENCY DEPT VISIT HI MDM: CPT

## 2025-05-12 PROCEDURE — 6360000004 HC RX CONTRAST MEDICATION: Performed by: RADIOLOGY

## 2025-05-12 PROCEDURE — 93970 EXTREMITY STUDY: CPT

## 2025-05-12 RX ORDER — IPRATROPIUM BROMIDE AND ALBUTEROL SULFATE 2.5; .5 MG/3ML; MG/3ML
1 SOLUTION RESPIRATORY (INHALATION) ONCE
Status: COMPLETED | OUTPATIENT
Start: 2025-05-12 | End: 2025-05-12

## 2025-05-12 RX ORDER — IOPAMIDOL 755 MG/ML
75 INJECTION, SOLUTION INTRAVASCULAR
Status: COMPLETED | OUTPATIENT
Start: 2025-05-12 | End: 2025-05-12

## 2025-05-12 RX ORDER — FENTANYL CITRATE 50 UG/ML
25 INJECTION, SOLUTION INTRAMUSCULAR; INTRAVENOUS ONCE
Refills: 0 | Status: COMPLETED | OUTPATIENT
Start: 2025-05-12 | End: 2025-05-12

## 2025-05-12 RX ADMIN — FENTANYL CITRATE 25 MCG: 50 INJECTION INTRAMUSCULAR; INTRAVENOUS at 16:08

## 2025-05-12 RX ADMIN — IOPAMIDOL 75 ML: 755 INJECTION, SOLUTION INTRAVENOUS at 19:16

## 2025-05-12 RX ADMIN — IPRATROPIUM BROMIDE AND ALBUTEROL SULFATE 1 DOSE: .5; 3 SOLUTION RESPIRATORY (INHALATION) at 15:20

## 2025-05-12 ASSESSMENT — PAIN - FUNCTIONAL ASSESSMENT: PAIN_FUNCTIONAL_ASSESSMENT: ACTIVITIES ARE NOT PREVENTED

## 2025-05-12 ASSESSMENT — PAIN DESCRIPTION - LOCATION
LOCATION: BACK;CHEST;LEG
LOCATION: GENERALIZED

## 2025-05-12 ASSESSMENT — LIFESTYLE VARIABLES
HOW MANY STANDARD DRINKS CONTAINING ALCOHOL DO YOU HAVE ON A TYPICAL DAY: PATIENT DOES NOT DRINK
HOW OFTEN DO YOU HAVE A DRINK CONTAINING ALCOHOL: NEVER

## 2025-05-12 ASSESSMENT — PAIN SCALES - GENERAL
PAINLEVEL_OUTOF10: 8
PAINLEVEL_OUTOF10: 8

## 2025-05-12 ASSESSMENT — PAIN DESCRIPTION - DESCRIPTORS: DESCRIPTORS: ACHING;DISCOMFORT;SORE

## 2025-05-12 ASSESSMENT — PAIN DESCRIPTION - ORIENTATION: ORIENTATION: RIGHT;LEFT

## 2025-05-12 NOTE — ED PROVIDER NOTES
Past Surgical History:   Procedure Laterality Date    ABDOMEN SURGERY  01/01/1969    hemmorhage after vag delivery    AORTIC VALVE REPLACEMENT  11/28/2011    REPLACEMENT AV W/21 MM MEDTRONIC MOSAIC TISSUE PROSTHESIS (DR. FENG)    APPENDECTOMY      BRAIN SURGERY  01/01/2000    tri geminal nerve [facility]    CARDIAC CATHETERIZATION Right 01/08/2013    Dr. Foley    CARDIAC CATHETERIZATION  02/10/2015    Dr Foley    CARDIAC VALVE REPLACEMENT  06/17/2015    AVR    CHEST SURGERY  11/28/2011    POSTOP RE-EXPLORATION FOR BLEEDING FROM STERNAL WIRE    COLONOSCOPY      CORONARY ANGIOPLASTY  10/04/2010    SUCCESSFUL GABRIEL OF PROXIMAL LAD, SUCCESSFUL GABRIEL OF RCA (PROXIMAL, MID AND DISTAL)    DENTAL SURGERY      full mouth teeth extracted    DIAGNOSTIC CARDIAC CATH LAB PROCEDURE  07/26/2010    STENOTIC CAD, MILD AORTIC STENOSI & EFOF 70%.    DIAGNOSTIC CARDIAC CATH LAB PROCEDURE  10/04/2010    DIAGNOSTIC CARDIAC CATH LAB PROCEDURE  11/22/2011    PREVIOUSLY PLACED STENTS IN LAD/RCA PATENT    ECHOCARDIOGRAM TRANSESOPHAGEAL  01/27/2014         ENDOSCOPY, COLON, DIAGNOSTIC      EYE SURGERY      lan lense implants    HEMORRHOID SURGERY      HYSTERECTOMY (CERVIX STATUS UNKNOWN)  01/01/1980       CURRENTMEDICATIONS       Discharge Medication List as of 5/12/2025 10:12 PM        CONTINUE these medications which have NOT CHANGED    Details   docusate sodium (COLACE, DULCOLAX) 100 MG CAPS Take 100 mg by mouth 2 times dailyDC to SNF      lactulose (CHRONULAC) 10 GM/15ML solution Take 30 mLs by mouth dailyDC to SNF      arformoterol tartrate (BROVANA) 15 MCG/2ML NEBU Take 2 mLs by nebulization in the morning and 2 mLs in the evening.DC to SNF      clonazePAM (KLONOPIN) 1 MG tablet Take 1 tablet by mouth every 12 hours as needed for Anxiety for up to 3 days. Max Daily Amount: 2 mg, Disp-6 tablet, R-0Print      pantoprazole (PROTONIX) 40 MG tablet Take 1 tablet by mouth every morningHistorical Med      albuterol (PROVENTIL) (2.5

## 2025-05-13 VITALS
DIASTOLIC BLOOD PRESSURE: 72 MMHG | SYSTOLIC BLOOD PRESSURE: 136 MMHG | RESPIRATION RATE: 25 BRPM | OXYGEN SATURATION: 100 % | TEMPERATURE: 97.8 F | WEIGHT: 184 LBS | HEIGHT: 64 IN | HEART RATE: 83 BPM | BODY MASS INDEX: 31.41 KG/M2

## 2025-05-13 NOTE — DISCHARGE INSTRUCTIONS
Please call and follow-up with your family doctor as well as your cardiologist.  Continue to take your Lasix as previously prescribed.  Return to emergency department if you develop worsening trouble breathing or worsening leg swelling that is not improving.

## 2025-05-15 ENCOUNTER — APPOINTMENT (OUTPATIENT)
Dept: GENERAL RADIOLOGY | Age: 80
DRG: 194 | End: 2025-05-15
Payer: MEDICAID

## 2025-05-15 ENCOUNTER — HOSPITAL ENCOUNTER (INPATIENT)
Age: 80
LOS: 4 days | Discharge: ANOTHER HOSPITAL | DRG: 194 | End: 2025-05-19
Attending: EMERGENCY MEDICINE | Admitting: INTERNAL MEDICINE
Payer: MEDICAID

## 2025-05-15 DIAGNOSIS — I20.9 ANGINA PECTORIS: ICD-10-CM

## 2025-05-15 DIAGNOSIS — R07.9 CHEST PAIN, UNSPECIFIED TYPE: ICD-10-CM

## 2025-05-15 DIAGNOSIS — I50.9 ACUTE ON CHRONIC CONGESTIVE HEART FAILURE, UNSPECIFIED HEART FAILURE TYPE (HCC): ICD-10-CM

## 2025-05-15 DIAGNOSIS — I35.0 NONRHEUMATIC AORTIC VALVE STENOSIS: ICD-10-CM

## 2025-05-15 DIAGNOSIS — Z95.3 HISTORY OF AORTIC VALVE REPLACEMENT WITH BIOPROSTHETIC VALVE: Primary | ICD-10-CM

## 2025-05-15 LAB
ALBUMIN SERPL-MCNC: 3.6 G/DL (ref 3.5–5.2)
ALP SERPL-CCNC: 93 U/L (ref 35–104)
ALT SERPL-CCNC: 21 U/L (ref 0–35)
ANION GAP SERPL CALCULATED.3IONS-SCNC: 10 MMOL/L (ref 7–16)
AST SERPL-CCNC: 22 U/L (ref 0–35)
BASOPHILS # BLD: 0.08 K/UL (ref 0–0.2)
BASOPHILS NFR BLD: 1 % (ref 0–2)
BILIRUB SERPL-MCNC: 0.3 MG/DL (ref 0–1.2)
BNP SERPL-MCNC: 293 PG/ML (ref 0–450)
BUN SERPL-MCNC: 21 MG/DL (ref 8–23)
CALCIUM SERPL-MCNC: 9.7 MG/DL (ref 8.8–10.2)
CHLORIDE SERPL-SCNC: 102 MMOL/L (ref 98–107)
CO2 SERPL-SCNC: 30 MMOL/L (ref 22–29)
CREAT SERPL-MCNC: 1.1 MG/DL (ref 0.5–1)
EKG ATRIAL RATE: 59 BPM
EKG P AXIS: 8 DEGREES
EKG P-R INTERVAL: 146 MS
EKG Q-T INTERVAL: 442 MS
EKG QRS DURATION: 80 MS
EKG QTC CALCULATION (BAZETT): 437 MS
EKG R AXIS: 28 DEGREES
EKG T AXIS: 50 DEGREES
EKG VENTRICULAR RATE: 59 BPM
EOSINOPHIL # BLD: 0.13 K/UL (ref 0.05–0.5)
EOSINOPHILS RELATIVE PERCENT: 1 % (ref 0–6)
ERYTHROCYTE [DISTWIDTH] IN BLOOD BY AUTOMATED COUNT: 14.3 % (ref 11.5–15)
GFR, ESTIMATED: 51 ML/MIN/1.73M2
GLUCOSE SERPL-MCNC: 104 MG/DL (ref 74–99)
HCT VFR BLD AUTO: 32.7 % (ref 34–48)
HGB BLD-MCNC: 10.3 G/DL (ref 11.5–15.5)
IMM GRANULOCYTES # BLD AUTO: 0.11 K/UL (ref 0–0.58)
IMM GRANULOCYTES NFR BLD: 1 % (ref 0–5)
LYMPHOCYTES NFR BLD: 1.12 K/UL (ref 1.5–4)
LYMPHOCYTES RELATIVE PERCENT: 11 % (ref 20–42)
MAGNESIUM SERPL-MCNC: 1.9 MG/DL (ref 1.6–2.4)
MCH RBC QN AUTO: 30.7 PG (ref 26–35)
MCHC RBC AUTO-ENTMCNC: 31.5 G/DL (ref 32–34.5)
MCV RBC AUTO: 97.6 FL (ref 80–99.9)
MONOCYTES NFR BLD: 0.78 K/UL (ref 0.1–0.95)
MONOCYTES NFR BLD: 8 % (ref 2–12)
NEUTROPHILS NFR BLD: 78 % (ref 43–80)
NEUTS SEG NFR BLD: 7.84 K/UL (ref 1.8–7.3)
PLATELET # BLD AUTO: 396 K/UL (ref 130–450)
PMV BLD AUTO: 9.3 FL (ref 7–12)
POTASSIUM SERPL-SCNC: 3.8 MMOL/L (ref 3.5–5.1)
PROT SERPL-MCNC: 6.1 G/DL (ref 6.4–8.3)
RBC # BLD AUTO: 3.35 M/UL (ref 3.5–5.5)
SODIUM SERPL-SCNC: 142 MMOL/L (ref 136–145)
TROPONIN I SERPL HS-MCNC: 22 NG/L (ref 0–14)
TROPONIN I SERPL HS-MCNC: 23 NG/L (ref 0–14)
WBC OTHER # BLD: 10.1 K/UL (ref 4.5–11.5)

## 2025-05-15 PROCEDURE — 6360000002 HC RX W HCPCS: Performed by: INTERNAL MEDICINE

## 2025-05-15 PROCEDURE — 83735 ASSAY OF MAGNESIUM: CPT

## 2025-05-15 PROCEDURE — 96375 TX/PRO/DX INJ NEW DRUG ADDON: CPT

## 2025-05-15 PROCEDURE — 80053 COMPREHEN METABOLIC PANEL: CPT

## 2025-05-15 PROCEDURE — 85025 COMPLETE CBC W/AUTO DIFF WBC: CPT

## 2025-05-15 PROCEDURE — 93005 ELECTROCARDIOGRAM TRACING: CPT | Performed by: EMERGENCY MEDICINE

## 2025-05-15 PROCEDURE — 6360000002 HC RX W HCPCS: Performed by: EMERGENCY MEDICINE

## 2025-05-15 PROCEDURE — 84484 ASSAY OF TROPONIN QUANT: CPT

## 2025-05-15 PROCEDURE — 94640 AIRWAY INHALATION TREATMENT: CPT

## 2025-05-15 PROCEDURE — 6370000000 HC RX 637 (ALT 250 FOR IP): Performed by: EMERGENCY MEDICINE

## 2025-05-15 PROCEDURE — 83880 ASSAY OF NATRIURETIC PEPTIDE: CPT

## 2025-05-15 PROCEDURE — 36415 COLL VENOUS BLD VENIPUNCTURE: CPT

## 2025-05-15 PROCEDURE — 71045 X-RAY EXAM CHEST 1 VIEW: CPT

## 2025-05-15 PROCEDURE — 93010 ELECTROCARDIOGRAM REPORT: CPT | Performed by: INTERNAL MEDICINE

## 2025-05-15 PROCEDURE — 2140000000 HC CCU INTERMEDIATE R&B

## 2025-05-15 PROCEDURE — 99285 EMERGENCY DEPT VISIT HI MDM: CPT

## 2025-05-15 PROCEDURE — 96374 THER/PROPH/DIAG INJ IV PUSH: CPT

## 2025-05-15 RX ORDER — CLOPIDOGREL BISULFATE 75 MG/1
75 TABLET ORAL DAILY
Status: DISCONTINUED | OUTPATIENT
Start: 2025-05-16 | End: 2025-05-20 | Stop reason: HOSPADM

## 2025-05-15 RX ORDER — ASPIRIN 81 MG/1
324 TABLET, CHEWABLE ORAL ONCE
Status: COMPLETED | OUTPATIENT
Start: 2025-05-15 | End: 2025-05-15

## 2025-05-15 RX ORDER — ARFORMOTEROL TARTRATE 15 UG/2ML
15 SOLUTION RESPIRATORY (INHALATION)
Status: DISCONTINUED | OUTPATIENT
Start: 2025-05-15 | End: 2025-05-20 | Stop reason: HOSPADM

## 2025-05-15 RX ORDER — BUDESONIDE 0.5 MG/2ML
1 INHALANT ORAL
Status: DISCONTINUED | OUTPATIENT
Start: 2025-05-15 | End: 2025-05-20 | Stop reason: HOSPADM

## 2025-05-15 RX ORDER — ACETAMINOPHEN 325 MG/1
650 TABLET ORAL EVERY 6 HOURS PRN
Status: DISCONTINUED | OUTPATIENT
Start: 2025-05-15 | End: 2025-05-20 | Stop reason: HOSPADM

## 2025-05-15 RX ORDER — ASPIRIN 81 MG/1
81 TABLET, CHEWABLE ORAL EVERY MORNING
Status: DISCONTINUED | OUTPATIENT
Start: 2025-05-16 | End: 2025-05-20 | Stop reason: HOSPADM

## 2025-05-15 RX ORDER — METOPROLOL TARTRATE 25 MG/1
12.5 TABLET, FILM COATED ORAL 2 TIMES DAILY
Status: DISCONTINUED | OUTPATIENT
Start: 2025-05-15 | End: 2025-05-16

## 2025-05-15 RX ORDER — CLONAZEPAM 0.5 MG/1
1 TABLET ORAL 2 TIMES DAILY PRN
Status: DISCONTINUED | OUTPATIENT
Start: 2025-05-15 | End: 2025-05-20 | Stop reason: HOSPADM

## 2025-05-15 RX ORDER — FENTANYL CITRATE 50 UG/ML
50 INJECTION, SOLUTION INTRAMUSCULAR; INTRAVENOUS ONCE
Status: COMPLETED | OUTPATIENT
Start: 2025-05-15 | End: 2025-05-15

## 2025-05-15 RX ORDER — LOPERAMIDE HYDROCHLORIDE 2 MG/1
2 CAPSULE ORAL EVERY 6 HOURS PRN
Status: DISCONTINUED | OUTPATIENT
Start: 2025-05-15 | End: 2025-05-20 | Stop reason: HOSPADM

## 2025-05-15 RX ORDER — ALOGLIPTIN 6.25 MG/1
6.25 TABLET, FILM COATED ORAL DAILY
Status: DISCONTINUED | OUTPATIENT
Start: 2025-05-16 | End: 2025-05-20 | Stop reason: HOSPADM

## 2025-05-15 RX ORDER — ATORVASTATIN CALCIUM 40 MG/1
40 TABLET, FILM COATED ORAL DAILY
Status: DISCONTINUED | OUTPATIENT
Start: 2025-05-16 | End: 2025-05-20 | Stop reason: HOSPADM

## 2025-05-15 RX ORDER — RAMELTEON 8 MG/1
8 TABLET ORAL NIGHTLY
COMMUNITY

## 2025-05-15 RX ORDER — LACTULOSE 10 G/15ML
20 SOLUTION ORAL DAILY
Status: DISCONTINUED | OUTPATIENT
Start: 2025-05-16 | End: 2025-05-20 | Stop reason: HOSPADM

## 2025-05-15 RX ORDER — CLONAZEPAM 1 MG/1
1 TABLET ORAL 2 TIMES DAILY PRN
COMMUNITY

## 2025-05-15 RX ORDER — MECOBALAMIN 5000 MCG
5 TABLET,DISINTEGRATING ORAL NIGHTLY
Status: DISCONTINUED | OUTPATIENT
Start: 2025-05-15 | End: 2025-05-20 | Stop reason: HOSPADM

## 2025-05-15 RX ORDER — FUROSEMIDE 10 MG/ML
20 INJECTION INTRAMUSCULAR; INTRAVENOUS DAILY
Status: DISCONTINUED | OUTPATIENT
Start: 2025-05-16 | End: 2025-05-20 | Stop reason: HOSPADM

## 2025-05-15 RX ORDER — FUROSEMIDE 10 MG/ML
20 INJECTION INTRAMUSCULAR; INTRAVENOUS ONCE
Status: COMPLETED | OUTPATIENT
Start: 2025-05-15 | End: 2025-05-15

## 2025-05-15 RX ORDER — ALBUTEROL SULFATE 0.83 MG/ML
2.5 SOLUTION RESPIRATORY (INHALATION) EVERY 6 HOURS PRN
Status: DISCONTINUED | OUTPATIENT
Start: 2025-05-15 | End: 2025-05-20 | Stop reason: HOSPADM

## 2025-05-15 RX ORDER — EZETIMIBE 10 MG/1
10 TABLET ORAL DAILY
Status: DISCONTINUED | OUTPATIENT
Start: 2025-05-16 | End: 2025-05-20 | Stop reason: HOSPADM

## 2025-05-15 RX ORDER — PANTOPRAZOLE SODIUM 40 MG/1
40 TABLET, DELAYED RELEASE ORAL EVERY MORNING
Status: DISCONTINUED | OUTPATIENT
Start: 2025-05-16 | End: 2025-05-20 | Stop reason: HOSPADM

## 2025-05-15 RX ORDER — LISINOPRIL 20 MG/1
20 TABLET ORAL DAILY
Status: DISCONTINUED | OUTPATIENT
Start: 2025-05-16 | End: 2025-05-16

## 2025-05-15 RX ORDER — MIRTAZAPINE 15 MG/1
15 TABLET, FILM COATED ORAL NIGHTLY
Status: DISCONTINUED | OUTPATIENT
Start: 2025-05-15 | End: 2025-05-20 | Stop reason: HOSPADM

## 2025-05-15 RX ORDER — DOCUSATE SODIUM 100 MG/1
100 CAPSULE, LIQUID FILLED ORAL 2 TIMES DAILY
Status: DISCONTINUED | OUTPATIENT
Start: 2025-05-15 | End: 2025-05-20 | Stop reason: HOSPADM

## 2025-05-15 RX ADMIN — BUDESONIDE 1000 MCG: 0.5 SUSPENSION RESPIRATORY (INHALATION) at 22:23

## 2025-05-15 RX ADMIN — FENTANYL CITRATE 50 MCG: 50 INJECTION INTRAMUSCULAR; INTRAVENOUS at 13:21

## 2025-05-15 RX ADMIN — FUROSEMIDE 20 MG: 10 INJECTION, SOLUTION INTRAMUSCULAR; INTRAVENOUS at 14:16

## 2025-05-15 RX ADMIN — ASPIRIN 81 MG CHEWABLE TABLET 324 MG: 81 TABLET CHEWABLE at 12:23

## 2025-05-15 RX ADMIN — ARFORMOTEROL TARTRATE 15 MCG: 15 SOLUTION RESPIRATORY (INHALATION) at 22:23

## 2025-05-15 ASSESSMENT — PAIN SCALES - GENERAL
PAINLEVEL_OUTOF10: 1
PAINLEVEL_OUTOF10: 9

## 2025-05-15 ASSESSMENT — PAIN DESCRIPTION - LOCATION: LOCATION: CHEST

## 2025-05-15 NOTE — ED PROVIDER NOTES
[facility] EMERGENCY DEPARTMENT  EMERGENCY DEPARTMENT ENCOUNTER        Pt Name: Barbara Hutson  MRN: [id number]  Birthdate 1945  Date of evaluation: 5/15/2025  Provider: Irma Leone DO  PCP: Ananth Huff MD  Note Started: 11:55 AM EDT 5/15/25    CHIEF COMPLAINT       Chief Complaint   Patient presents with    Chest Pain     Started a couple of weeks ago. From NH       HISTORY OF PRESENT ILLNESS: 1 or more Elements   History From: Patient and daughters    Limitations to history : Dementia    Barbara Hutson is a 79 y.o. female who presents with concern for an episode of chest pain worsening swelling her lower extremities but notes that she is having worsening swelling in her lower extremities for the past few weeks, notes that she was admitted to [facility] and the swelling did not improve, has been wearing compression stockings, does reside in a nursing home and wears nasal cannula oxygen at baseline and ambulates minimally.  This morning she began to complain of a sharp left-sided chest pain radiating into her left arm.  It since resolved.  Has not been sick at all recently.  Taking medications as indicated.  No other associated complaints.      EXTERNAL NOTE REVIEW:      On chart review last admitted to the hospital for anemia requiring transfusions on 4/2/2025.  Last echo was 12/12/2023 with ejection fraction 65 to 70%    REVIEW OF SYSTEMS :      Positives and Pertinent negatives as per HPI.     SURGICAL HISTORY     Past Surgical History:   Procedure Laterality Date    ABDOMEN SURGERY  01/01/1969    hemmorhage after vag delivery    AORTIC VALVE REPLACEMENT  11/28/2011    REPLACEMENT AV W/21 MM MEDTRONIC MOSAIC TISSUE PROSTHESIS (DR. FENG)    APPENDECTOMY      BRAIN SURGERY  01/01/2000    tri geminal nerve [facility]    CARDIAC CATHETERIZATION Right 01/08/2013    Dr. Foley    CARDIAC CATHETERIZATION  02/10/2015    Dr Foley    CARDIAC VALVE  included in the SEP-1 core measure? No Exclusion criteria - the patient is NOT to be included for SEP-1 Core Measure due to: Infection is not suspected        Medical Decision Making/Differential Diagnosis:    CC/HPI Summary, Social Determinants of health, Records Reviewed, DDx, testing done/not done, ED Course, Reassessment, disposition considerations/shared decision making with patient, consults, disposition:      ED Course as of 05/15/25 1514   u May 15, 2025   1209 Discussed with patient's cardiologist. [MB]   1224 EKG:  This EKG is signed and interpreted by me.    Rate: 59  Rhythm: Sinus  Interpretation: no acute changes, no acute ST elevations, axis normal, QTc 437,   Comparison: stable as compared to patient's most recent EKG on 5/12/2025   [MB]   1513 Internal medical except for admission [MB]      ED Course User Index  [MB] Irma Leone,         Medical Decision Making  Amount and/or Complexity of Data Reviewed  Labs: ordered.  Radiology: ordered.  ECG/medicine tests: ordered.    Risk  OTC drugs.  Prescription drug management.  Decision regarding hospitalization.        79-year-old female past medical history of CAD, CHF, COPD, hypertension, hyperlipidemia presents with concern for chest pain and swelling in her lower extremities.  Hemodynamically stable on arrival to the emergency medicine, nontoxic in no acute distress.  Daughter helping to provide history as patient is a poor historian.  Pitting edema bilateral lower extremities, compression stockings in place, clear breath sounds, baseline nasal cannula oxygen, physical exam otherwise unremarkable.    Differential diagnosis to include but not limited to volume overload, ACS, arrhythmia.    Social determinants to health to include oxygen, stress, nursing facility    EKG without acute changes, chest x-ray does demonstrate mild pulmonary vascular congestion although BNP is unremarkable, initial troponin 23 with repeat pending.  Stable kidney

## 2025-05-16 LAB
ANION GAP SERPL CALCULATED.3IONS-SCNC: 11 MMOL/L (ref 7–16)
BUN SERPL-MCNC: 18 MG/DL (ref 8–23)
CALCIUM SERPL-MCNC: 9.3 MG/DL (ref 8.8–10.2)
CHLORIDE SERPL-SCNC: 102 MMOL/L (ref 98–107)
CO2 SERPL-SCNC: 28 MMOL/L (ref 22–29)
CREAT SERPL-MCNC: 1 MG/DL (ref 0.5–1)
GFR, ESTIMATED: 61 ML/MIN/1.73M2
GLUCOSE SERPL-MCNC: 98 MG/DL (ref 74–99)
POTASSIUM SERPL-SCNC: 3.6 MMOL/L (ref 3.5–5.1)
SODIUM SERPL-SCNC: 141 MMOL/L (ref 136–145)

## 2025-05-16 PROCEDURE — 6360000002 HC RX W HCPCS: Performed by: INTERNAL MEDICINE

## 2025-05-16 PROCEDURE — 80048 BASIC METABOLIC PNL TOTAL CA: CPT

## 2025-05-16 PROCEDURE — 2140000000 HC CCU INTERMEDIATE R&B

## 2025-05-16 PROCEDURE — 36415 COLL VENOUS BLD VENIPUNCTURE: CPT

## 2025-05-16 PROCEDURE — 6370000000 HC RX 637 (ALT 250 FOR IP): Performed by: INTERNAL MEDICINE

## 2025-05-16 PROCEDURE — 94640 AIRWAY INHALATION TREATMENT: CPT

## 2025-05-16 RX ORDER — METOPROLOL TARTRATE 25 MG/1
25 TABLET, FILM COATED ORAL 2 TIMES DAILY
Status: DISCONTINUED | OUTPATIENT
Start: 2025-05-16 | End: 2025-05-20 | Stop reason: HOSPADM

## 2025-05-16 RX ORDER — LISINOPRIL 10 MG/1
10 TABLET ORAL DAILY
Status: DISCONTINUED | OUTPATIENT
Start: 2025-05-16 | End: 2025-05-20 | Stop reason: HOSPADM

## 2025-05-16 RX ORDER — HYDROCODONE BITARTRATE AND ACETAMINOPHEN 5; 325 MG/1; MG/1
1 TABLET ORAL EVERY 6 HOURS PRN
Status: DISCONTINUED | OUTPATIENT
Start: 2025-05-16 | End: 2025-05-16

## 2025-05-16 RX ORDER — HYDROCODONE BITARTRATE AND ACETAMINOPHEN 5; 325 MG/1; MG/1
1 TABLET ORAL EVERY 6 HOURS PRN
Status: DISCONTINUED | OUTPATIENT
Start: 2025-05-16 | End: 2025-05-20 | Stop reason: HOSPADM

## 2025-05-16 RX ORDER — HYDROCODONE BITARTRATE AND ACETAMINOPHEN 5; 325 MG/1; MG/1
1 TABLET ORAL EVERY 6 HOURS PRN
COMMUNITY

## 2025-05-16 RX ADMIN — DOCUSATE SODIUM 100 MG: 100 CAPSULE, LIQUID FILLED ORAL at 08:39

## 2025-05-16 RX ADMIN — EZETIMIBE 10 MG: 10 TABLET ORAL at 08:41

## 2025-05-16 RX ADMIN — NITROGLYCERIN 1 INCH: 20 OINTMENT TOPICAL at 15:18

## 2025-05-16 RX ADMIN — MIRTAZAPINE 15 MG: 15 TABLET, FILM COATED ORAL at 00:51

## 2025-05-16 RX ADMIN — PANTOPRAZOLE SODIUM 40 MG: 40 TABLET, DELAYED RELEASE ORAL at 08:38

## 2025-05-16 RX ADMIN — METOPROLOL TARTRATE 25 MG: 25 TABLET, FILM COATED ORAL at 20:42

## 2025-05-16 RX ADMIN — Medication 5 MG: at 20:42

## 2025-05-16 RX ADMIN — ASPIRIN 81 MG CHEWABLE TABLET 81 MG: 81 TABLET CHEWABLE at 08:39

## 2025-05-16 RX ADMIN — CLONAZEPAM 1 MG: 0.5 TABLET ORAL at 00:54

## 2025-05-16 RX ADMIN — DOCUSATE SODIUM 100 MG: 100 CAPSULE, LIQUID FILLED ORAL at 00:51

## 2025-05-16 RX ADMIN — HYDROCODONE BITARTRATE AND ACETAMINOPHEN 1 TABLET: 5; 325 TABLET ORAL at 09:07

## 2025-05-16 RX ADMIN — HYDROCODONE BITARTRATE AND ACETAMINOPHEN 1 TABLET: 5; 325 TABLET ORAL at 20:49

## 2025-05-16 RX ADMIN — LISINOPRIL 10 MG: 10 TABLET ORAL at 08:38

## 2025-05-16 RX ADMIN — BUDESONIDE 1000 MCG: 0.5 SUSPENSION RESPIRATORY (INHALATION) at 19:56

## 2025-05-16 RX ADMIN — DOCUSATE SODIUM 100 MG: 100 CAPSULE, LIQUID FILLED ORAL at 20:42

## 2025-05-16 RX ADMIN — Medication 5 MG: at 00:54

## 2025-05-16 RX ADMIN — METOPROLOL TARTRATE 25 MG: 25 TABLET, FILM COATED ORAL at 08:39

## 2025-05-16 RX ADMIN — CLONAZEPAM 1 MG: 0.5 TABLET ORAL at 20:49

## 2025-05-16 RX ADMIN — ARFORMOTEROL TARTRATE 15 MCG: 15 SOLUTION RESPIRATORY (INHALATION) at 19:56

## 2025-05-16 RX ADMIN — ALOGLIPTIN 6.25 MG: 6.25 TABLET, FILM COATED ORAL at 08:41

## 2025-05-16 RX ADMIN — CLONAZEPAM 1 MG: 0.5 TABLET ORAL at 09:07

## 2025-05-16 RX ADMIN — ATORVASTATIN CALCIUM 40 MG: 40 TABLET, FILM COATED ORAL at 08:38

## 2025-05-16 RX ADMIN — CLOPIDOGREL BISULFATE 75 MG: 75 TABLET, FILM COATED ORAL at 08:40

## 2025-05-16 RX ADMIN — HYDROCODONE BITARTRATE AND ACETAMINOPHEN 1 TABLET: 5; 325 TABLET ORAL at 00:51

## 2025-05-16 RX ADMIN — FUROSEMIDE 20 MG: 10 INJECTION, SOLUTION INTRAMUSCULAR; INTRAVENOUS at 08:42

## 2025-05-16 RX ADMIN — MIRTAZAPINE 15 MG: 15 TABLET, FILM COATED ORAL at 20:42

## 2025-05-16 ASSESSMENT — PAIN SCALES - GENERAL
PAINLEVEL_OUTOF10: 8
PAINLEVEL_OUTOF10: 5
PAINLEVEL_OUTOF10: 0
PAINLEVEL_OUTOF10: 5

## 2025-05-16 ASSESSMENT — PAIN DESCRIPTION - DESCRIPTORS
DESCRIPTORS: SHARP;BURNING;ACHING
DESCRIPTORS: ACHING;DISCOMFORT

## 2025-05-16 ASSESSMENT — PAIN DESCRIPTION - LOCATION
LOCATION: GENERALIZED
LOCATION: CHEST;ABDOMEN
LOCATION: CHEST

## 2025-05-16 ASSESSMENT — PAIN - FUNCTIONAL ASSESSMENT
PAIN_FUNCTIONAL_ASSESSMENT: ACTIVITIES ARE NOT PREVENTED
PAIN_FUNCTIONAL_ASSESSMENT: PREVENTS OR INTERFERES SOME ACTIVE ACTIVITIES AND ADLS

## 2025-05-16 ASSESSMENT — PAIN DESCRIPTION - ORIENTATION
ORIENTATION: RIGHT;LEFT;ANTERIOR
ORIENTATION: MID
ORIENTATION: MID

## 2025-05-16 NOTE — DISCHARGE INSTR - COC
Continuity of Care Form    Patient Name: Barbara Hutson   :  1945  MRN:  [id number]    Admit date:  5/15/2025  Discharge date:  25    Code Status Order: Prior   Advance Directives:     Admitting Physician:  Vinay Mariscal MD  PCP: Ananth Huff MD    Discharging Nurse: Preet  Discharging Hospital Unit/Room#: 6420/6420-A  Discharging Unit Phone Number: [phone]    Emergency Contact:   Extended Emergency Contact Information  Primary Emergency Contact: Zoila Acosta  Address: [address] [place] [address] [facility]  Home Phone: [phone]  Mobile Phone: [phone]  Relation: Child  Preferred language: English   needed? No  Secondary Emergency Contact: [place]  Mobile Phone: [phone]  Relation: Grandchild  Preferred language: English   needed? No    Past Surgical History:  Past Surgical History:   Procedure Laterality Date    ABDOMEN SURGERY  1969    hemmorhage after vag delivery    AORTIC VALVE REPLACEMENT  2011    REPLACEMENT AV W/21 MM MEDTRONIC MOSAIC TISSUE PROSTHESIS (DR. FENG)    APPENDECTOMY      BRAIN SURGERY  2000    tri geminal nerve [facility]    CARDIAC CATHETERIZATION Right 2013    Dr. Foley    CARDIAC CATHETERIZATION  02/10/2015    Dr Foley    CARDIAC VALVE REPLACEMENT  2015    AVR    CHEST SURGERY  2011    POSTOP RE-EXPLORATION FOR BLEEDING FROM STERNAL WIRE    COLONOSCOPY      CORONARY ANGIOPLASTY  10/04/2010    SUCCESSFUL GABRIEL OF PROXIMAL LAD, SUCCESSFUL GABRIEL OF RCA (PROXIMAL, MID AND DISTAL)    DENTAL SURGERY      full mouth teeth extracted    DIAGNOSTIC CARDIAC CATH LAB PROCEDURE  2010    STENOTIC CAD, MILD AORTIC STENOSI & EFOF 70%.    DIAGNOSTIC CARDIAC CATH LAB PROCEDURE  10/04/2010    DIAGNOSTIC CARDIAC CATH LAB PROCEDURE  2011    PREVIOUSLY PLACED STENTS IN LAD/RCA PATENT    ECHOCARDIOGRAM TRANSESOPHAGEAL  2014         ENDOSCOPY, COLON,  DIAGNOSTIC      EYE SURGERY      lan lense implants    HEMORRHOID SURGERY      HYSTERECTOMY (CERVIX STATUS UNKNOWN)  1980       Immunization History:   Immunization History   Administered Date(s) Administered    Influenza Virus Vaccine 2013    TDaP, ADACEL (age 10y-64y), BOOSTRIX (age 10y+), IM, 0.5mL 2016    Td, unspecified formulation 2012       Active Problems:  Patient Active Problem List   Diagnosis Code    Aortic stenosis I35.0    COPD (chronic obstructive pulmonary disease) ([facility]) J44.9    Hypertension I10    H/O hyperlipidemia Z86.39    CAD (coronary artery disease) I25.10    Acute CVA (cerebrovascular accident) ([facility]) I63.9    Cerebral aneurysm I67.1    Intracranial atherosclerosis I67.2    Stroke-like symptoms R29.90    TIA (transient ischemic attack) G45.9    Acute cystitis with hematuria N30.01    NINA (acute kidney injury) N17.9    Acute cystitis without hematuria N30.00    Failure to thrive in adult R62.7    Anxiety and depression F41.9, F32.A    Asthma J45.909    Chronic back pain M54.9, G89.29    GERD (gastroesophageal reflux disease) K21.9    Hyperlipidemia E78.5    Somnolence R40.0    Anemia requiring transfusions D64.9    Acute on chronic congestive heart failure, unspecified heart failure type ([facility]) I50.9       Isolation/Infection:   Isolation            No Isolation          Patient Infection Status    No active infections.   Resolved       Infection Onset Added Last Indicated Last Indicated By Resolved Resolved By    Human Metapneumovirus 25 Respiratory Panel, Molecular, with COVID-19 (Restricted: peds pts or suitable admitted adults) 25 history Infection     COVID-19 22 Respiratory Panel, Molecular, with COVID-19 (Restricted: peds pts or suitable admitted adults) 22 Infection                          Nurse Assessment:  Last Vital Signs: BP (!) 124/51   Pulse 51   Temp 97.5 °F (36.4 °C)   Resp 16

## 2025-05-16 NOTE — PROGRESS NOTES
4 Eyes Skin Assessment     NAME:  Barbara Hutson  YOB: 1945  MEDICAL RECORD NUMBER:  [id number]    The patient is being assessed for  Admission    I agree that at least one RN has performed a thorough Head to Toe Skin Assessment on the patient. ALL assessment sites listed below have been assessed.      Areas assessed by both nurses:    Head, Face, Ears, Shoulders, Back, Chest, Arms, Elbows, Hands, Sacrum. Buttock, Coccyx, Ischium, Legs. Feet and Heels, and Under Medical Devices         Does the Patient have a Wound? No noted wound(s)       Mark Prevention initiated by RN: No  Wound Care Orders initiated by RN: No    Pressure Injury (Stage 3,4, Unstageable, DTI, NWPT, and Complex wounds) if present, place Wound referral order by RN under : No    New Ostomies, if present place, Ostomy referral order under : No     Nurse 1 eSignature: Electronically signed by Denae Lewis RN on 5/15/25 at 10:11 PM EDT    **SHARE this note so that the co-signing nurse can place an eSignature**    Nurse 2 eSignature: Electronically signed by Petty Portillo RN on 5/15/25 at 10:22 PM EDT

## 2025-05-16 NOTE — CONSULTS
CARDIOLOGY CONSULTATION    Patient Name:  Barbara Hutson    :  1945    Reason for Consultation:   History of aortic stenosis and subsequent aortic valve replacement; History of coronary artery disease and coronary artery PCI LAD and RCA.    History of Present Illness:   Barbara Hutson returns to [facility] following history of Recurrent right-sided parasternal chest discomfort as well as intermittent left pressure-like discomfort which occurs at rest and not necessarily with exertion. having a long-standing history of both coronary artery disease and aortic valve disease for which she underwent successful aortic valve replacement in  with her last apparent echocardiogram in 2017 demonstrating a normal functioning bioprosthetic aortic valve yet with the previous transesophageal echo suggesting that of a mild - moderate perivalvular leak.  Presently she denies any significant shortness of breath but experiences intermittent chest     Past Medical History:   has a past medical history of Anxiety and depression, Arthritis, Asthma, Borderline diabetes, CAD (coronary artery disease), CHF (congestive heart failure) ([facility]), Chronic back pain, COPD (chronic obstructive pulmonary disease) ([facility]), Diverticulosis, GERD (gastroesophageal reflux disease), Hyperlipidemia, Hypertension, Left rotator cuff tear, Pneumonia, PONV (postoperative nausea and vomiting), Prolonged emergence from general anesthesia, and TIA (transient ischemic attack). prosthetic aortic valve # 21 Magna Ease bioprosthesis with perivalvular leak.    Surgical History:   has a past surgical history that includes Dental surgery; Hemorrhoid surgery; Appendectomy; Diagnostic Cardiac Cath Lab Procedure (2010); Diagnostic Cardiac Cath Lab Procedure (10/04/2010); Diagnostic Cardiac Cath Lab Procedure (2011); Coronary angioplasty (10/04/2010); Aortic valve replacement (2011); Chest surgery (2011); Cardiac  Problem List   Diagnosis Code    Aortic stenosis I35.0    COPD (chronic obstructive pulmonary disease) (Former[facility]) J44.9    Hypertension I10    H/O hyperlipidemia Z86.39    CAD (coronary artery disease) I25.10    Acute CVA (cerebrovascular accident) (Formerly [facility]) I63.9    Cerebral aneurysm I67.1    Intracranial atherosclerosis I67.2    Stroke-like symptoms R29.90    TIA (transient ischemic attack) G45.9    Acute cystitis with hematuria N30.01    NINA (acute kidney injury) N17.9    Acute cystitis without hematuria N30.00    Failure to thrive in adult R62.7    Anxiety and depression F41.9, F32.A    Asthma J45.909    Chronic back pain M54.9, G89.29    GERD (gastroesophageal reflux disease) K21.9    Hyperlipidemia E78.5    Somnolence R40.0    Anemia requiring transfusions D64.9    Acute on chronic congestive heart failure, unspecified heart failure type ([facility]) I50.9       Plan:  At this time will obtain a two-dimensional echocardiogram and compare to the old and make relative recommendations.  Certainly she should be on a statin to maintain her LDL cholesterol at <70 mg/dL based upon the above results we will make further recommendations but would certainly maintain strict SBE prophylaxis as well.  Continue to monitor for cardiac arrhythmia.Likewise if at all possible.  Would attempt to treat medically but will also obtain a nuclear stress test if grossly abnormal, she will need cardiac catheterization with potential limited catheter-based intervention.    I have spent more than 55 minutes face to face with Barbara Hutson,and reviewing notes and laboratory data with greater than 50% of this time instructing and counseling the patient  regarding my findings and recommendations and I have answered all questions as posed to me by Ms. Hutson. Thank you, Ananth Huff MD for allowing me to consult in the care of this patient.    Tor Paulson, DO , FACP, FACC, [facility]    NOTE:  This report was transcribed using voice recognition

## 2025-05-16 NOTE — PROGRESS NOTES
Called [facility] facility to request medication list be faxed to us. Spoke with nurse, she will fax.

## 2025-05-16 NOTE — PROGRESS NOTES
Pt refused bath. Pt said they are getting transferred and doesn't need one. Pt said if anything their family member will do it.

## 2025-05-16 NOTE — CARE COORDINATION
5/16:  Transition of care:  Pt presented to the ER for bilateral lower extremity edema from [facility].  Pt is on 3L/NC at 97% & Iv Lasix.  Cardiology - Dr Paulson consulted.  Cm spoke with pt bedside to discuss CM role & dc planning.  Pt's dc plan is to return to [facility].  Per Liaison pt is a long-term bed hold & can return when medically cleared.  JULIA & Ambulette form completed placed in envelope in soft chart.  CM placed pt on will call with PAS for 5/17.  Sw/CM will continue to follow.  Electronically signed by Perla Mckeon RN on 5/16/2025 at 11:25 AM    Case Management Assessment  Initial Evaluation    Date/Time of Evaluation: 5/16/2025 11:33 AM  Assessment Completed by: Perla Mckeon RN    If patient is discharged prior to next notation, then this note serves as note for discharge by case management.    Patient Name: Barbara Hutson                   YOB: 1945  Diagnosis: Chest pain, unspecified type [R07.9]  Acute on chronic congestive heart failure, unspecified heart failure type (HCC) [I50.9]                   Date / Time: 5/15/2025 11:25 AM    Patient Admission Status: Inpatient   Readmission Risk (Low < 19, Mod (19-27), High > 27): Readmission Risk Score: 24.1    Current PCP: Ananth Huff MD  PCP verified by ? Yes    Chart Reviewed: Yes      History Provided by: Patient  Patient Orientation: Alert and Oriented, Person, Place, Situation    Patient Cognition: Alert    Hospitalization in the last 30 days (Readmission):  No    If yes, Readmission Assessment in  Navigator will be completed.    Advance Directives:      Code Status: Prior   Patient's Primary Decision Maker is: Legal Next of Kin    Primary Decision Maker: Zoila Acosta - Child - [phone]    Discharge Planning:    Patient lives with: Family Members Type of Home: Skilled Nursing Facility  Primary Care Giver: Self  Patient Support Systems include: Children   Current Financial resources:    Current community

## 2025-05-16 NOTE — PLAN OF CARE
Problem: Safety - Adult  Goal: Free from fall injury  5/16/2025 1149 by Kristine Urbano RN  Outcome: Progressing  Flowsheets (Taken 5/16/2025 0800)  Free From Fall Injury: Instruct family/caregiver on patient safety  5/15/2025 2149 by Denae Lewis RN  Outcome: Progressing     Problem: Chronic Conditions and Co-morbidities  Goal: Patient's chronic conditions and co-morbidity symptoms are monitored and maintained or improved  5/16/2025 1149 by Kristine Urbano RN  Outcome: Progressing  5/15/2025 2149 by Denae Lewis RN  Outcome: Progressing     Problem: Discharge Planning  Goal: Discharge to home or other facility with appropriate resources  5/16/2025 1149 by Kristine Urbano RN  Outcome: Progressing  5/15/2025 2149 by Denae Lewis RN  Outcome: Progressing     Problem: Skin/Tissue Integrity  Goal: Skin integrity remains intact  Description: 1.  Monitor for areas of redness and/or skin breakdown2.  Assess vascular access sites hourly3.  Every 4-6 hours minimum:  Change oxygen saturation probe site4.  Every 4-6 hours:  If on nasal continuous positive airway pressure, respiratory therapy assess nares and determine need for appliance change or resting period  Outcome: Progressing  Flowsheets (Taken 5/16/2025 0800)  Skin Integrity Remains Intact: Monitor for areas of redness and/or skin breakdown

## 2025-05-16 NOTE — H&P
[facility]  History and Physical      CHIEF COMPLAINT: Chest pain CHF    Reason for Admission: As above    History Obtained From: Patient    PCP :  Ananth Huff MD    [address] / [place] [facility] [address]      HISTORY OF PRESENT ILLNESS:      The patient is a 79 y.o. female who was just evaluated and released from the ED 2 days prior presents to the emergency room from a local nursing home secondary to chest pain, shortness of breath with exertion.  Patient is chronically on oxygen.  Apparently she is does not ambulate all that much.  She complained of chest pain and shortness of breath and she was sent into the emergency room.  She was evaluated in the emergency room and felt to be suffering from acute on chronic congestive heart failure.  Patient also has a history of coronary artery disease, COPD, hypertension, hyperlipidemia.    Past Medical History:        Diagnosis Date    Anxiety and depression     Arthritis     Asthma     Borderline diabetes     CAD (coronary artery disease)     CHF (congestive heart failure) (HCC)     Chronic back pain     COPD (chronic obstructive pulmonary disease) (HCC)     Diverticulosis     GERD (gastroesophageal reflux disease)     Hyperlipidemia     Hypertension     Left rotator cuff tear     Pneumonia 2012    PONV (postoperative nausea and vomiting)     Prolonged emergence from general anesthesia     TIA (transient ischemic attack) 09/2012     Past Surgical History:        Procedure Laterality Date    ABDOMEN SURGERY  01/01/1969    hemmorhage after vag delivery    AORTIC VALVE REPLACEMENT  11/28/2011    REPLACEMENT AV W/21 MM MEDTRONIC MOSAIC TISSUE PROSTHESIS (DR. FENG)    APPENDECTOMY      BRAIN SURGERY  01/01/2000    tri geminal nerve [facility]    CARDIAC CATHETERIZATION Right 01/08/2013    Dr. Foley    CARDIAC CATHETERIZATION  02/10/2015    Dr Foley    CARDIAC VALVE REPLACEMENT  06/17/2015    AVR    CHEST SURGERY

## 2025-05-16 NOTE — ED NOTES
ED TO INPATIENT SBAR HANDOFF    Patient Name: Barbara Hutson   Preferred Name: Barbara  : 1945  79 y.o.   Code Status Order: Prior  Telemetry Order: Yes  C-SSRS:    Sitter no and n/a  n/a  Restraints:       Situation  Chief Complaint   Patient presents with    Chest Pain     Started a couple of weeks ago. From NH     Brief Description of Patient's Condition: Acute on chronic congestive heart failure, unspecified heart failure type (HCC) /Chest Pain, Unspecified type  Mental Status: oriented, alert, coherent, logical, thought processes intact, and able to concentrate and follow conversation    Background  Allergies:   Allergies   Allergen Reactions    Codeine Nausea And Vomiting    Pain Patch [Menthol] Nausea And Vomiting       Assessment  Vitals/MEWS:        Vitals:    05/15/25 2015 05/15/25 2030 05/15/25 2102 05/15/25 2104   BP:   (!) 130/51 128/70   Pulse: 77 77 61 65   Resp: 25 26 29 20   Temp:       TempSrc:       SpO2: 98% 97% 98% 96%     Cardiac Rhythm:    Deterioration Index (DI): Deterioration Index: 28.07  Deterioration Index (DI) Interventions Performed:    O2 Flow Rate:    O2 Device:      Active Central Lines:                          Active Wounds:    Active Joseph's:      Recommendation  Patient Belongings:    Additional Comments: n/a  If any further questions, please call Sending RN at [phone]  Opportunity for questions and clarification were provided to (name of person notified and time): SONG Dove AT 0352       Electronically signed by: Electronically signed by Marlee Lazo RN on 5/15/2025 at 9:25 PM

## 2025-05-17 ENCOUNTER — APPOINTMENT (OUTPATIENT)
Age: 80
DRG: 194 | End: 2025-05-17
Attending: INTERNAL MEDICINE
Payer: MEDICAID

## 2025-05-17 ENCOUNTER — APPOINTMENT (OUTPATIENT)
Dept: NUCLEAR MEDICINE | Age: 80
DRG: 194 | End: 2025-05-17
Attending: INTERNAL MEDICINE
Payer: MEDICAID

## 2025-05-17 LAB
ANION GAP SERPL CALCULATED.3IONS-SCNC: 10 MMOL/L (ref 7–16)
BUN SERPL-MCNC: 21 MG/DL (ref 8–23)
CALCIUM SERPL-MCNC: 9 MG/DL (ref 8.8–10.2)
CHLORIDE SERPL-SCNC: 100 MMOL/L (ref 98–107)
CO2 SERPL-SCNC: 28 MMOL/L (ref 22–29)
CREAT SERPL-MCNC: 0.9 MG/DL (ref 0.5–1)
ECHO BSA: 1.95 M2
GFR, ESTIMATED: 62 ML/MIN/1.73M2
GLUCOSE BLD-MCNC: 116 MG/DL (ref 74–99)
GLUCOSE SERPL-MCNC: 111 MG/DL (ref 74–99)
NUC STRESS EJECTION FRACTION: 92 %
POTASSIUM SERPL-SCNC: 4 MMOL/L (ref 3.5–5.1)
SODIUM SERPL-SCNC: 138 MMOL/L (ref 136–145)
STRESS BASELINE DIAS BP: 60 MMHG
STRESS BASELINE HR: 54 BPM
STRESS BASELINE SYS BP: 104 MMHG
STRESS ESTIMATED WORKLOAD: 1 METS
STRESS PEAK DIAS BP: 54 MMHG
STRESS PEAK SYS BP: 112 MMHG
STRESS PERCENT HR ACHIEVED: 61 %
STRESS POST PEAK HR: 86 BPM
STRESS RATE PRESSURE PRODUCT: 9632 BPM*MMHG
STRESS ST DEPRESSION: -0.2 MM
STRESS TARGET HR: 142 BPM

## 2025-05-17 PROCEDURE — 94640 AIRWAY INHALATION TREATMENT: CPT

## 2025-05-17 PROCEDURE — 3430000000 HC RX DIAGNOSTIC RADIOPHARMACEUTICAL: Performed by: RADIOLOGY

## 2025-05-17 PROCEDURE — 6360000002 HC RX W HCPCS: Performed by: INTERNAL MEDICINE

## 2025-05-17 PROCEDURE — 36415 COLL VENOUS BLD VENIPUNCTURE: CPT

## 2025-05-17 PROCEDURE — A9500 TC99M SESTAMIBI: HCPCS | Performed by: RADIOLOGY

## 2025-05-17 PROCEDURE — 80048 BASIC METABOLIC PNL TOTAL CA: CPT

## 2025-05-17 PROCEDURE — 6370000000 HC RX 637 (ALT 250 FOR IP): Performed by: INTERNAL MEDICINE

## 2025-05-17 PROCEDURE — 78452 HT MUSCLE IMAGE SPECT MULT: CPT

## 2025-05-17 PROCEDURE — 82962 GLUCOSE BLOOD TEST: CPT

## 2025-05-17 PROCEDURE — 93017 CV STRESS TEST TRACING ONLY: CPT

## 2025-05-17 PROCEDURE — 2140000000 HC CCU INTERMEDIATE R&B

## 2025-05-17 RX ORDER — REGADENOSON 0.08 MG/ML
0.4 INJECTION, SOLUTION INTRAVENOUS
Status: COMPLETED | OUTPATIENT
Start: 2025-05-17 | End: 2025-05-17

## 2025-05-17 RX ORDER — TETRAKIS(2-METHOXYISOBUTYLISOCYANIDE)COPPER(I) TETRAFLUOROBORATE 1 MG/ML
35 INJECTION, POWDER, LYOPHILIZED, FOR SOLUTION INTRAVENOUS
Status: COMPLETED | OUTPATIENT
Start: 2025-05-17 | End: 2025-05-17

## 2025-05-17 RX ORDER — TETRAKIS(2-METHOXYISOBUTYLISOCYANIDE)COPPER(I) TETRAFLUOROBORATE 1 MG/ML
11.4 INJECTION, POWDER, LYOPHILIZED, FOR SOLUTION INTRAVENOUS
Status: COMPLETED | OUTPATIENT
Start: 2025-05-17 | End: 2025-05-17

## 2025-05-17 RX ADMIN — Medication 11.4 MILLICURIE: at 11:57

## 2025-05-17 RX ADMIN — CLOPIDOGREL BISULFATE 75 MG: 75 TABLET, FILM COATED ORAL at 09:20

## 2025-05-17 RX ADMIN — Medication 39.2 MILLICURIE: at 13:10

## 2025-05-17 RX ADMIN — ASPIRIN 81 MG CHEWABLE TABLET 81 MG: 81 TABLET CHEWABLE at 09:20

## 2025-05-17 RX ADMIN — BUDESONIDE 1000 MCG: 0.5 SUSPENSION RESPIRATORY (INHALATION) at 19:45

## 2025-05-17 RX ADMIN — ACETAMINOPHEN 650 MG: 325 TABLET ORAL at 09:38

## 2025-05-17 RX ADMIN — REGADENOSON 0.4 MG: 0.08 INJECTION, SOLUTION INTRAVENOUS at 13:07

## 2025-05-17 RX ADMIN — ALOGLIPTIN 6.25 MG: 6.25 TABLET, FILM COATED ORAL at 09:20

## 2025-05-17 RX ADMIN — Medication 5 MG: at 21:56

## 2025-05-17 RX ADMIN — BUDESONIDE 1000 MCG: 0.5 SUSPENSION RESPIRATORY (INHALATION) at 08:25

## 2025-05-17 RX ADMIN — CLONAZEPAM 1 MG: 0.5 TABLET ORAL at 09:39

## 2025-05-17 RX ADMIN — DOCUSATE SODIUM 100 MG: 100 CAPSULE, LIQUID FILLED ORAL at 09:20

## 2025-05-17 RX ADMIN — EZETIMIBE 10 MG: 10 TABLET ORAL at 09:20

## 2025-05-17 RX ADMIN — PANTOPRAZOLE SODIUM 40 MG: 40 TABLET, DELAYED RELEASE ORAL at 09:21

## 2025-05-17 RX ADMIN — ARFORMOTEROL TARTRATE 15 MCG: 15 SOLUTION RESPIRATORY (INHALATION) at 08:25

## 2025-05-17 RX ADMIN — HYDROCODONE BITARTRATE AND ACETAMINOPHEN 1 TABLET: 5; 325 TABLET ORAL at 18:05

## 2025-05-17 RX ADMIN — MIRTAZAPINE 15 MG: 15 TABLET, FILM COATED ORAL at 21:56

## 2025-05-17 RX ADMIN — METOPROLOL TARTRATE 25 MG: 25 TABLET, FILM COATED ORAL at 21:56

## 2025-05-17 RX ADMIN — LISINOPRIL 10 MG: 10 TABLET ORAL at 09:20

## 2025-05-17 RX ADMIN — ATORVASTATIN CALCIUM 40 MG: 40 TABLET, FILM COATED ORAL at 09:20

## 2025-05-17 RX ADMIN — ARFORMOTEROL TARTRATE 15 MCG: 15 SOLUTION RESPIRATORY (INHALATION) at 19:45

## 2025-05-17 RX ADMIN — DOCUSATE SODIUM 100 MG: 100 CAPSULE, LIQUID FILLED ORAL at 21:56

## 2025-05-17 ASSESSMENT — PAIN SCALES - GENERAL
PAINLEVEL_OUTOF10: 9
PAINLEVEL_OUTOF10: 2
PAINLEVEL_OUTOF10: 7

## 2025-05-17 ASSESSMENT — PAIN DESCRIPTION - LOCATION
LOCATION: FOOT;STERNUM
LOCATION: BACK;STERNUM

## 2025-05-17 ASSESSMENT — PAIN DESCRIPTION - DESCRIPTORS
DESCRIPTORS: ACHING;SORE;DISCOMFORT
DESCRIPTORS: ACHING;DISCOMFORT;NAGGING

## 2025-05-17 ASSESSMENT — PAIN - FUNCTIONAL ASSESSMENT: PAIN_FUNCTIONAL_ASSESSMENT: ACTIVITIES ARE NOT PREVENTED

## 2025-05-17 ASSESSMENT — PAIN DESCRIPTION - ORIENTATION
ORIENTATION: RIGHT;LEFT
ORIENTATION: MID;LOWER

## 2025-05-17 NOTE — PLAN OF CARE
Problem: Safety - Adult  Goal: Free from fall injury  Outcome: Progressing     Problem: Chronic Conditions and Co-morbidities  Goal: Patient's chronic conditions and co-morbidity symptoms are monitored and maintained or improved  Outcome: Progressing     Problem: Discharge Planning  Goal: Discharge to home or other facility with appropriate resources  Outcome: Progressing     Problem: Skin/Tissue Integrity  Goal: Skin integrity remains intact  Description: 1.  Monitor for areas of redness and/or skin breakdown2.  Assess vascular access sites hourly3.  Every 4-6 hours minimum:  Change oxygen saturation probe site4.  Every 4-6 hours:  If on nasal continuous positive airway pressure, respiratory therapy assess nares and determine need for appliance change or resting period  Outcome: Progressing     Problem: Pain  Goal: Verbalizes/displays adequate comfort level or baseline comfort level  Outcome: Progressing     Problem: ABCDS Injury Assessment  Goal: Absence of physical injury  Outcome: Progressing

## 2025-05-17 NOTE — PROGRESS NOTES
PROGRESS NOTE       PATIENT PROBLEM LIST:  Patient Active Problem List   Diagnosis Code    Aortic stenosis I35.0    COPD (chronic obstructive pulmonary disease) (Formerly [facility]) J44.9    Hypertension I10    H/O hyperlipidemia Z86.39    CAD (coronary artery disease) I25.10    Acute CVA (cerebrovascular accident) ([facility]) I63.9    Cerebral aneurysm I67.1    Intracranial atherosclerosis I67.2    Stroke-like symptoms R29.90    TIA (transient ischemic attack) G45.9    Acute cystitis with hematuria N30.01    NINA (acute kidney injury) N17.9    Acute cystitis without hematuria N30.00    Failure to thrive in adult R62.7    Anxiety and depression F41.9, F32.A    Asthma J45.909    Chronic back pain M54.9, G89.29    GERD (gastroesophageal reflux disease) K21.9    Hyperlipidemia E78.5    Somnolence R40.0    Anemia requiring transfusions D64.9    Acute on chronic congestive heart failure, unspecified heart failure type ([facility]) I50.9       SUBJECTIVE:  Barbara Hutson states she continues to feel recurrent pain arising from her right parasternal area made worse with change in position which she clearly attributes to something wrong with her sternum ever since the time of her heart surgery.She presently denies any significant shortness of breath at rest, nor palpitations or lightheadedness.    REVIEW OF SYSTEMS:  General ROS: negative for - fatigue, malaise,  weight gain or weight loss  Psychological ROS: negative for - anxiety , depression  Ophthalmic ROS: negative for - decreased vision or visual distortion.  ENT ROS: negative  Allergy and Immunology ROS: negative  Hematological and Lymphatic ROS: negative  Endocrine: no heat or cold intolerance and no polyphagia, polydipsia, or polyuria  Respiratory ROS: positive for - Occasional shortness of breath but no hemoptysis.  Cardiovascular ROS: positive for - Right parasternal chest discomfort..  Gastrointestinal ROS: no abdominal pain, change in bowel habits, or black or bloody stools  Genito-Urinary  nontender, nondistended,  moderately protuberant, no masses or organomegaly.  Bowel sounds active.  Extremities: Without clubbing, cyanosis or edema. Pulses present 3+ upper extermities bilaterally; present 1+ DP  bilaterally and barely palpable PT bilaterally.     Data:   Scheduled Meds: Reviewed  Continuous Infusions:     Intake/Output Summary (Last 24 hours) at 5/16/2025 1931  Last data filed at 5/16/2025 0907  Gross per 24 hour   Intake 240 ml   Output --   Net 240 ml     CBC:   Recent Labs     05/15/25  1204   WBC 10.1   HGB 10.3*   HCT 32.7*        BMP:  Recent Labs     05/15/25  1204 05/16/25  0439    141   K 3.8 3.6    102   CO2 30* 28   BUN 21 18   CREATININE 1.1* 1.0   LABGLOM 51* 61     ABGs:   Lab Results   Component Value Date/Time    PH 7.319 01/18/2025 09:05 PM    PH 7.343 11/29/2011 12:45 AM    PO2 92.8 01/18/2025 09:05 PM    PCO2 56.5 01/18/2025 09:05 PM     INR: No results for input(s): \"INR\" in the last 72 hours.  PRO-BNP:   Lab Results   Component Value Date    PROBNP 293 05/15/2025    PROBNP 321 05/12/2025      TSH:   Lab Results   Component Value Date    TSH 0.59 01/17/2025      Cardiac Injury Profile:   Recent Labs     05/15/25  1204 05/15/25  1420   TROPHS 23* 22*      Lipid Profile:   Lab Results   Component Value Date/Time    TRIG 126 12/11/2023 06:05 AM    HDL 48 12/11/2023 06:05 AM     12/11/2023 06:05 AM     04/10/2023 10:34 AM    CHOL 178 12/11/2023 06:05 AM    CHOL 170 04/10/2023 10:34 AM      Hemoglobin A1C: No components found for: \"HGBA1C\"      RAD:   XR CHEST PORTABLE  Result Date: 5/15/2025  Cardiomegaly with pulmonary vascular congestion.         EKG: See Report  Echo: See Report      IMPRESSIONS:  Patient Active Problem List   Diagnosis Code    Aortic stenosis I35.0    COPD (chronic obstructive pulmonary disease) ([facility]) J44.9    Hypertension I10    H/O hyperlipidemia Z86.39    CAD (coronary artery disease) I25.10    Acute CVA (cerebrovascular

## 2025-05-17 NOTE — CONSULTS
Palliative Care Department  [phone]  Palliative Care Initial Consult  Provider Jailene Mackey, APRN - CNP     Barbara Hutson  [id number]  Hospital Day: 3  Date of Initial Consult: 5/17/2025  Referring Provider: Vinay Mariscal MD  Palliative Medicine was consulted for assistance with: Goals of care    HPI:   Barbara Hutson is a 79 y.o. with a medical history of anxiety, depression, arthritis, asthma, CAD, CHF, chronic back pain, COPD, GERD, hyperlipidemia, hypertension, pneumonia, TIA, who was admitted on 5/15/2025 from nursing facility with a CHIEF COMPLAINT of chest pain, shortness of breath.  Patient is chronically on oxygen.  Patient complained of chest pain and shortness of breath and was sent to the ER.  Cardiology consulted.  Palliative medicine consulted for further assistance.    ASSESSMENT/PLAN:     Pertinent Hospital Diagnoses     Acute on chronic congestive heart failure  Chest pain  COPD  CAD      Palliative Care Encounter / Counseling Regarding Goals of Care  Please see detailed goals of care discussion as below  At this time, Barbara Hutosn, Does have capacity for medical decision-making.  Capacity is time limited and situation/question specific  During encounter there was no surrogate medical decision-maker  Outcome of goals of care meeting:   Continue current medical management  Discussed CODE STATUS options  Code status DNR-CCA  Advanced Directives: no POA or living will in epic  Surrogate/Legal NOK:  Zoila Acosta (child) [phone]  Shannon South (grandchild) [phone]    Spiritual assessment: no spiritual distress identified  Bereavement and grief: to be determined  Referrals to: none today  SUBJECTIVE:     Current medical issues leading to Palliative Medicine involvement include   Active Hospital Problems    Diagnosis Date Noted    Acute on chronic congestive heart failure, unspecified heart failure type (HCC) [I50.9] 05/15/2025       Details of Conversation:    Chart

## 2025-05-17 NOTE — PLAN OF CARE
Problem: Safety - Adult  Goal: Free from fall injury  Outcome: Progressing     Problem: Chronic Conditions and Co-morbidities  Goal: Patient's chronic conditions and co-morbidity symptoms are monitored and maintained or improved  Outcome: Progressing     Problem: Discharge Planning  Goal: Discharge to home or other facility with appropriate resources  Outcome: Progressing     Problem: Skin/Tissue Integrity  Goal: Skin integrity remains intact  Description: 1.  Monitor for areas of redness and/or skin breakdown2.  Assess vascular access sites hourly3.  Every 4-6 hours minimum:  Change oxygen saturation probe site4.  Every 4-6 hours:  If on nasal continuous positive airway pressure, respiratory therapy assess nares and determine need for appliance change or resting period  Outcome: Progressing     Problem: Pain  Goal: Verbalizes/displays adequate comfort level or baseline comfort level  Outcome: Progressing  Flowsheets (Taken 5/16/2025 1615)  Verbalizes/displays adequate comfort level or baseline comfort level:   Encourage patient to monitor pain and request assistance   Assess pain using appropriate pain scale   Administer analgesics based on type and severity of pain and evaluate response

## 2025-05-17 NOTE — PROGRESS NOTES
Subjective:    Chief complaint:    Complaining of same issues  Does not seem to be significantly better  No problems overnight.  Denies chest pain, angina, and dyspnea.  Denies abdominal pain.  Tolerating diet.  No nausea or vomiting.    Objective:    BP (!) 100/56   Pulse 74   Temp 97.5 °F (36.4 °C) (Oral)   Resp 16   Ht 1.626 m (5' 4\")   Wt 84.4 kg (186 lb)   SpO2 99%   BMI 31.93 kg/m²   General : Awake ,alert,no distress.  Heart:  RRR, no murmurs, gallops, or rubs.  Lungs:  CTA bilaterally, no wheeze, rales or rhonchi  Abd: bowel sounds present, nontender, nondistended, no masses  Extrem:  No clubbing, cyanosis, or edema    CBC:   Lab Results   Component Value Date/Time    WBC 10.1 05/15/2025 12:04 PM    RBC 3.35 05/15/2025 12:04 PM    HGB 10.3 05/15/2025 12:04 PM    HCT 32.7 05/15/2025 12:04 PM    MCV 97.6 05/15/2025 12:04 PM    MCH 30.7 05/15/2025 12:04 PM    MCHC 31.5 05/15/2025 12:04 PM    RDW 14.3 05/15/2025 12:04 PM     05/15/2025 12:04 PM    MPV 9.3 05/15/2025 12:04 PM     BMP:    Lab Results   Component Value Date/Time     05/17/2025 04:43 AM    K 4.0 05/17/2025 04:43 AM    K 3.3 05/13/2022 11:22 AM     05/17/2025 04:43 AM    CO2 28 05/17/2025 04:43 AM    BUN 21 05/17/2025 04:43 AM    CREATININE 0.9 05/17/2025 04:43 AM    CALCIUM 9.0 05/17/2025 04:43 AM    GFRAA >60 05/14/2022 01:59 AM    LABGLOM 62 05/17/2025 04:43 AM    LABGLOM >60 12/16/2023 05:37 AM    GLUCOSE 111 05/17/2025 04:43 AM    GLUCOSE 97 05/05/2012 04:25 AM     PT/INR:    Lab Results   Component Value Date/Time    PROTIME 14.5 12/10/2023 05:15 PM    PROTIME 12.8 11/28/2011 06:20 PM    INR 1.3 12/10/2023 05:15 PM     Troponin:    Lab Results   Component Value Date/Time    TROPONINI <0.01 09/16/2018 03:19 PM       No results for input(s): \"LABURIN\" in the last 72 hours.  No results for input(s): \"BC\" in the last 72 hours.  No results for input(s): \"BLOODCULT2\" in the last 72 hours.      Current Facility-Administered

## 2025-05-18 LAB
ANION GAP SERPL CALCULATED.3IONS-SCNC: 11 MMOL/L (ref 7–16)
BUN SERPL-MCNC: 17 MG/DL (ref 8–23)
CALCIUM SERPL-MCNC: 9.2 MG/DL (ref 8.8–10.2)
CHLORIDE SERPL-SCNC: 103 MMOL/L (ref 98–107)
CO2 SERPL-SCNC: 29 MMOL/L (ref 22–29)
CREAT SERPL-MCNC: 0.8 MG/DL (ref 0.5–1)
GFR, ESTIMATED: 73 ML/MIN/1.73M2
GLUCOSE SERPL-MCNC: 91 MG/DL (ref 74–99)
POTASSIUM SERPL-SCNC: 3.6 MMOL/L (ref 3.5–5.1)
SODIUM SERPL-SCNC: 142 MMOL/L (ref 136–145)

## 2025-05-18 PROCEDURE — 6360000002 HC RX W HCPCS: Performed by: INTERNAL MEDICINE

## 2025-05-18 PROCEDURE — 6370000000 HC RX 637 (ALT 250 FOR IP): Performed by: INTERNAL MEDICINE

## 2025-05-18 PROCEDURE — 2140000000 HC CCU INTERMEDIATE R&B

## 2025-05-18 PROCEDURE — 2700000000 HC OXYGEN THERAPY PER DAY

## 2025-05-18 PROCEDURE — 94640 AIRWAY INHALATION TREATMENT: CPT

## 2025-05-18 PROCEDURE — 80048 BASIC METABOLIC PNL TOTAL CA: CPT

## 2025-05-18 PROCEDURE — 36415 COLL VENOUS BLD VENIPUNCTURE: CPT

## 2025-05-18 PROCEDURE — 99221 1ST HOSP IP/OBS SF/LOW 40: CPT | Performed by: NURSE PRACTITIONER

## 2025-05-18 RX ADMIN — DOCUSATE SODIUM 100 MG: 100 CAPSULE, LIQUID FILLED ORAL at 08:55

## 2025-05-18 RX ADMIN — PANTOPRAZOLE SODIUM 40 MG: 40 TABLET, DELAYED RELEASE ORAL at 08:55

## 2025-05-18 RX ADMIN — MIRTAZAPINE 15 MG: 15 TABLET, FILM COATED ORAL at 21:29

## 2025-05-18 RX ADMIN — LACTULOSE 20 G: 20 SOLUTION ORAL at 08:55

## 2025-05-18 RX ADMIN — HYDROCODONE BITARTRATE AND ACETAMINOPHEN 1 TABLET: 5; 325 TABLET ORAL at 09:00

## 2025-05-18 RX ADMIN — LISINOPRIL 10 MG: 10 TABLET ORAL at 08:55

## 2025-05-18 RX ADMIN — METOPROLOL TARTRATE 25 MG: 25 TABLET, FILM COATED ORAL at 08:55

## 2025-05-18 RX ADMIN — CLONAZEPAM 1 MG: 0.5 TABLET ORAL at 21:28

## 2025-05-18 RX ADMIN — ARFORMOTEROL TARTRATE 15 MCG: 15 SOLUTION RESPIRATORY (INHALATION) at 09:37

## 2025-05-18 RX ADMIN — HYDROCODONE BITARTRATE AND ACETAMINOPHEN 1 TABLET: 5; 325 TABLET ORAL at 00:11

## 2025-05-18 RX ADMIN — METOPROLOL TARTRATE 25 MG: 25 TABLET, FILM COATED ORAL at 21:30

## 2025-05-18 RX ADMIN — ALOGLIPTIN 6.25 MG: 6.25 TABLET, FILM COATED ORAL at 08:56

## 2025-05-18 RX ADMIN — CLOPIDOGREL BISULFATE 75 MG: 75 TABLET, FILM COATED ORAL at 08:56

## 2025-05-18 RX ADMIN — BUDESONIDE 1000 MCG: 0.5 SUSPENSION RESPIRATORY (INHALATION) at 09:37

## 2025-05-18 RX ADMIN — FUROSEMIDE 20 MG: 10 INJECTION, SOLUTION INTRAMUSCULAR; INTRAVENOUS at 08:55

## 2025-05-18 RX ADMIN — ASPIRIN 81 MG CHEWABLE TABLET 81 MG: 81 TABLET CHEWABLE at 08:56

## 2025-05-18 RX ADMIN — DOCUSATE SODIUM 100 MG: 100 CAPSULE, LIQUID FILLED ORAL at 21:29

## 2025-05-18 RX ADMIN — Medication 5 MG: at 21:30

## 2025-05-18 RX ADMIN — CLONAZEPAM 1 MG: 0.5 TABLET ORAL at 00:11

## 2025-05-18 RX ADMIN — EZETIMIBE 10 MG: 10 TABLET ORAL at 08:56

## 2025-05-18 RX ADMIN — BUDESONIDE 1000 MCG: 0.5 SUSPENSION RESPIRATORY (INHALATION) at 21:53

## 2025-05-18 RX ADMIN — NITROGLYCERIN 1 INCH: 20 OINTMENT TOPICAL at 17:24

## 2025-05-18 RX ADMIN — CLONAZEPAM 1 MG: 0.5 TABLET ORAL at 09:01

## 2025-05-18 RX ADMIN — HYDROCODONE BITARTRATE AND ACETAMINOPHEN 1 TABLET: 5; 325 TABLET ORAL at 21:29

## 2025-05-18 RX ADMIN — ARFORMOTEROL TARTRATE 15 MCG: 15 SOLUTION RESPIRATORY (INHALATION) at 21:53

## 2025-05-18 RX ADMIN — ATORVASTATIN CALCIUM 40 MG: 40 TABLET, FILM COATED ORAL at 08:56

## 2025-05-18 ASSESSMENT — PAIN DESCRIPTION - ORIENTATION
ORIENTATION: MID
ORIENTATION: MID
ORIENTATION: RIGHT;LEFT

## 2025-05-18 ASSESSMENT — PAIN DESCRIPTION - LOCATION
LOCATION: CHEST
LOCATION: CHEST
LOCATION: GENERALIZED

## 2025-05-18 ASSESSMENT — PAIN SCALES - GENERAL
PAINLEVEL_OUTOF10: 9
PAINLEVEL_OUTOF10: 7
PAINLEVEL_OUTOF10: 6
PAINLEVEL_OUTOF10: 8
PAINLEVEL_OUTOF10: 6
PAINLEVEL_OUTOF10: 4
PAINLEVEL_OUTOF10: 0

## 2025-05-18 ASSESSMENT — PAIN DESCRIPTION - DESCRIPTORS
DESCRIPTORS: ACHING;DISCOMFORT
DESCRIPTORS: ACHING;SHARP;SORE
DESCRIPTORS: ACHING;SORE;TENDER

## 2025-05-18 ASSESSMENT — PAIN - FUNCTIONAL ASSESSMENT: PAIN_FUNCTIONAL_ASSESSMENT: PREVENTS OR INTERFERES SOME ACTIVE ACTIVITIES AND ADLS

## 2025-05-18 NOTE — PROGRESS NOTES
Spoke with Dr Paulson about nitropaste order, as pt's general pain is managed with norco and pt is not experiencing chest pain presently. For chest pain that is sharp/radiating and above and beyond the pt's normal sternal pain, the nitropaste should be used. Otherwise it can be held.

## 2025-05-18 NOTE — PROGRESS NOTES
PROGRESS NOTE       PATIENT PROBLEM LIST:  Patient Active Problem List   Diagnosis Code    Aortic stenosis I35.0    COPD (chronic obstructive pulmonary disease) (Former[facility]) J44.9    Hypertension I10    H/O hyperlipidemia Z86.39    CAD (coronary artery disease) I25.10    Acute CVA (cerebrovascular accident) ([facility]) I63.9    Cerebral aneurysm I67.1    Intracranial atherosclerosis I67.2    Stroke-like symptoms R29.90    TIA (transient ischemic attack) G45.9    Acute cystitis with hematuria N30.01    NINA (acute kidney injury) N17.9    Acute cystitis without hematuria N30.00    Failure to thrive in adult R62.7    Anxiety and depression F41.9, F32.A    Asthma J45.909    Chronic back pain M54.9, G89.29    GERD (gastroesophageal reflux disease) K21.9    Hyperlipidemia E78.5    Somnolence R40.0    Anemia requiring transfusions D64.9    Acute on chronic congestive heart failure, unspecified heart failure type ([facility]) I50.9       SUBJECTIVE:  Barbara LOYA Haresh notes that she continues to experience right parasternal chest wall soreness but no retrosternal heaviness.  She has completed her nuclear stress test without incident, nor reproducible symptoms.    REVIEW OF SYSTEMS:  General ROS: negative for - fatigue, malaise,  weight gain or weight loss  Psychological ROS: negative for - anxiety , depression  Ophthalmic ROS: negative for - decreased vision or visual distortion.  ENT ROS: negative  Allergy and Immunology ROS: negative  Hematological and Lymphatic ROS: negative  Endocrine: no heat or cold intolerance and no polyphagia, polydipsia, or polyuria  Respiratory ROS: positive for - Occasional shortness of breath but no hemoptysis.  Cardiovascular ROS: positive for - Right parasternal chest discomfort..  Gastrointestinal ROS: no abdominal pain, change in bowel habits, or black or bloody stools  Genito-Urinary ROS: no nocturia, dysuria, trouble voiding, frequency or hematuria  Musculoskeletal ROS: negative for- myalgias, arthralgias, or

## 2025-05-18 NOTE — PROGRESS NOTES
Subjective:    Chief complaint:    Patient is feeling okay  Had stress test yesterday  Multiple family members by the bedside  Objective:    BP (!) 115/55   Pulse 57   Temp 98 °F (36.7 °C) (Oral)   Resp 18   Ht 1.626 m (5' 4\")   Wt 84.4 kg (186 lb)   SpO2 100%   BMI 31.93 kg/m²   General : Awake ,alert,no distress.  Heart:  RRR, no murmurs, gallops, or rubs.  Lungs:  CTA bilaterally, no wheeze, rales or rhonchi  Abd: bowel sounds present, nontender, nondistended, no masses  Extrem:  No clubbing, cyanosis, or edema    CBC:   Lab Results   Component Value Date/Time    WBC 10.1 05/15/2025 12:04 PM    RBC 3.35 05/15/2025 12:04 PM    HGB 10.3 05/15/2025 12:04 PM    HCT 32.7 05/15/2025 12:04 PM    MCV 97.6 05/15/2025 12:04 PM    MCH 30.7 05/15/2025 12:04 PM    MCHC 31.5 05/15/2025 12:04 PM    RDW 14.3 05/15/2025 12:04 PM     05/15/2025 12:04 PM    MPV 9.3 05/15/2025 12:04 PM     BMP:    Lab Results   Component Value Date/Time     05/18/2025 04:54 AM    K 3.6 05/18/2025 04:54 AM    K 3.3 05/13/2022 11:22 AM     05/18/2025 04:54 AM    CO2 29 05/18/2025 04:54 AM    BUN 17 05/18/2025 04:54 AM    CREATININE 0.8 05/18/2025 04:54 AM    CALCIUM 9.2 05/18/2025 04:54 AM    GFRAA >60 05/14/2022 01:59 AM    LABGLOM 73 05/18/2025 04:54 AM    LABGLOM >60 12/16/2023 05:37 AM    GLUCOSE 91 05/18/2025 04:54 AM    GLUCOSE 97 05/05/2012 04:25 AM     PT/INR:    Lab Results   Component Value Date/Time    PROTIME 14.5 12/10/2023 05:15 PM    PROTIME 12.8 11/28/2011 06:20 PM    INR 1.3 12/10/2023 05:15 PM     Troponin:    Lab Results   Component Value Date/Time    TROPONINI <0.01 09/16/2018 03:19 PM       No results for input(s): \"LABURIN\" in the last 72 hours.  No results for input(s): \"BC\" in the last 72 hours.  No results for input(s): \"BLOODCULT2\" in the last 72 hours.      Current Facility-Administered Medications:     metoprolol tartrate (LOPRESSOR) tablet 25 mg, 25 mg, Oral, BID, Tor Paulson DO, 25 mg at

## 2025-05-19 ENCOUNTER — APPOINTMENT (OUTPATIENT)
Age: 80
DRG: 194 | End: 2025-05-19
Attending: INTERNAL MEDICINE
Payer: MEDICAID

## 2025-05-19 VITALS
WEIGHT: 185.19 LBS | TEMPERATURE: 98 F | DIASTOLIC BLOOD PRESSURE: 44 MMHG | HEART RATE: 88 BPM | BODY MASS INDEX: 31.62 KG/M2 | OXYGEN SATURATION: 97 % | SYSTOLIC BLOOD PRESSURE: 117 MMHG | HEIGHT: 64 IN | RESPIRATION RATE: 16 BRPM

## 2025-05-19 PROCEDURE — 6360000002 HC RX W HCPCS: Performed by: INTERNAL MEDICINE

## 2025-05-19 PROCEDURE — 6370000000 HC RX 637 (ALT 250 FOR IP): Performed by: INTERNAL MEDICINE

## 2025-05-19 PROCEDURE — 93306 TTE W/DOPPLER COMPLETE: CPT

## 2025-05-19 PROCEDURE — 94640 AIRWAY INHALATION TREATMENT: CPT

## 2025-05-19 RX ORDER — ONDANSETRON 4 MG/1
4 TABLET, FILM COATED ORAL EVERY 6 HOURS PRN
Status: DISCONTINUED | OUTPATIENT
Start: 2025-05-19 | End: 2025-05-20 | Stop reason: HOSPADM

## 2025-05-19 RX ORDER — METOPROLOL TARTRATE 25 MG/1
25 TABLET, FILM COATED ORAL 2 TIMES DAILY
Qty: 60 TABLET | Refills: 3
Start: 2025-05-19

## 2025-05-19 RX ADMIN — ALOGLIPTIN 6.25 MG: 6.25 TABLET, FILM COATED ORAL at 10:07

## 2025-05-19 RX ADMIN — ATORVASTATIN CALCIUM 40 MG: 40 TABLET, FILM COATED ORAL at 10:06

## 2025-05-19 RX ADMIN — HYDROCODONE BITARTRATE AND ACETAMINOPHEN 1 TABLET: 5; 325 TABLET ORAL at 10:15

## 2025-05-19 RX ADMIN — ASPIRIN 81 MG CHEWABLE TABLET 81 MG: 81 TABLET CHEWABLE at 10:07

## 2025-05-19 RX ADMIN — CLOPIDOGREL BISULFATE 75 MG: 75 TABLET, FILM COATED ORAL at 10:07

## 2025-05-19 RX ADMIN — MIRTAZAPINE 15 MG: 15 TABLET, FILM COATED ORAL at 20:33

## 2025-05-19 RX ADMIN — EZETIMIBE 10 MG: 10 TABLET ORAL at 10:06

## 2025-05-19 RX ADMIN — PANTOPRAZOLE SODIUM 40 MG: 40 TABLET, DELAYED RELEASE ORAL at 10:06

## 2025-05-19 RX ADMIN — BUDESONIDE 1000 MCG: 0.5 SUSPENSION RESPIRATORY (INHALATION) at 07:44

## 2025-05-19 RX ADMIN — HYDROCODONE BITARTRATE AND ACETAMINOPHEN 1 TABLET: 5; 325 TABLET ORAL at 20:32

## 2025-05-19 RX ADMIN — DOCUSATE SODIUM 100 MG: 100 CAPSULE, LIQUID FILLED ORAL at 20:33

## 2025-05-19 RX ADMIN — CLONAZEPAM 1 MG: 0.5 TABLET ORAL at 13:39

## 2025-05-19 RX ADMIN — DOCUSATE SODIUM 100 MG: 100 CAPSULE, LIQUID FILLED ORAL at 10:06

## 2025-05-19 RX ADMIN — ONDANSETRON HYDROCHLORIDE 4 MG: 4 TABLET, FILM COATED ORAL at 15:50

## 2025-05-19 RX ADMIN — ARFORMOTEROL TARTRATE 15 MCG: 15 SOLUTION RESPIRATORY (INHALATION) at 07:44

## 2025-05-19 RX ADMIN — NITROGLYCERIN 1 INCH: 20 OINTMENT TOPICAL at 13:39

## 2025-05-19 RX ADMIN — CLONAZEPAM 1 MG: 0.5 TABLET ORAL at 20:33

## 2025-05-19 RX ADMIN — METOPROLOL TARTRATE 25 MG: 25 TABLET, FILM COATED ORAL at 20:33

## 2025-05-19 RX ADMIN — Medication 5 MG: at 20:32

## 2025-05-19 RX ADMIN — FUROSEMIDE 20 MG: 10 INJECTION, SOLUTION INTRAMUSCULAR; INTRAVENOUS at 10:08

## 2025-05-19 RX ADMIN — LISINOPRIL 10 MG: 10 TABLET ORAL at 10:07

## 2025-05-19 RX ADMIN — METOPROLOL TARTRATE 25 MG: 25 TABLET, FILM COATED ORAL at 10:07

## 2025-05-19 ASSESSMENT — PAIN DESCRIPTION - PAIN TYPE: TYPE: CHRONIC PAIN

## 2025-05-19 ASSESSMENT — PAIN DESCRIPTION - LOCATION: LOCATION: GENERALIZED

## 2025-05-19 ASSESSMENT — PAIN DESCRIPTION - DESCRIPTORS
DESCRIPTORS: SHARP;STABBING;DISCOMFORT
DESCRIPTORS: ACHING;CRAMPING

## 2025-05-19 ASSESSMENT — PAIN DESCRIPTION - ORIENTATION
ORIENTATION: RIGHT;LEFT;ANTERIOR
ORIENTATION: RIGHT;LEFT

## 2025-05-19 ASSESSMENT — PAIN SCALES - GENERAL
PAINLEVEL_OUTOF10: 6
PAINLEVEL_OUTOF10: 8
PAINLEVEL_OUTOF10: 5

## 2025-05-19 ASSESSMENT — PAIN SCALES - WONG BAKER: WONGBAKER_NUMERICALRESPONSE: NO HURT

## 2025-05-19 NOTE — PROGRESS NOTES
Received a call back from Dr. Paulson. Dr. Paulson ok for discharge today from cardiology standpoint.    SEE ONEILL RN

## 2025-05-19 NOTE — PROGRESS NOTES
PROGRESS NOTE       PATIENT PROBLEM LIST:  Patient Active Problem List   Diagnosis Code    Aortic stenosis I35.0    COPD (chronic obstructive pulmonary disease) ([facility]) J44.9    Hypertension I10    H/O hyperlipidemia Z86.39    CAD (coronary artery disease) I25.10    Acute CVA (cerebrovascular accident) ([facility]) I63.9    Cerebral aneurysm I67.1    Intracranial atherosclerosis I67.2    Stroke-like symptoms R29.90    TIA (transient ischemic attack) G45.9    Acute cystitis with hematuria N30.01    NINA (acute kidney injury) N17.9    Acute cystitis without hematuria N30.00    Failure to thrive in adult R62.7    Anxiety and depression F41.9, F32.A    Asthma J45.909    Chronic back pain M54.9, G89.29    GERD (gastroesophageal reflux disease) K21.9    Hyperlipidemia E78.5    Somnolence R40.0    Anemia requiring transfusions D64.9    Acute on chronic congestive heart failure, unspecified heart failure type ([facility]) I50.9       SUBJECTIVE:  Barbara Hutson Is sitting up in a bedside chair in rather good spirits and quite alert.  She notes that she is still experiencing right parasternal chest discomfort as well as left posterior axillary discomfort which actually increases upon palpation.  It is very difficult to obtain a clear picture of her complaints.  Awaiting completion of two-dimensional echocardiogram.    REVIEW OF SYSTEMS:  General ROS: negative for - fatigue, malaise,  weight gain or weight loss  Psychological ROS: negative for - anxiety , depression  Ophthalmic ROS: negative for - decreased vision or visual distortion.  ENT ROS: negative  Allergy and Immunology ROS: negative  Hematological and Lymphatic ROS: negative  Endocrine: no heat or cold intolerance and no polyphagia, polydipsia, or polyuria  Respiratory ROS: positive for - Occasional shortness of breath but no hemoptysis.  Cardiovascular ROS: positive for - Right parasternal chest discomfort..  Gastrointestinal ROS: no abdominal pain, change in bowel habits, or black

## 2025-05-19 NOTE — PROGRESS NOTES
Call placed to [facility] to verify they are able to accept the patient tonight. [facility] able to accept the patient tonight. Call placed to [facility] to set up transportation.  time 9 pm.    SEE ONEILL RN

## 2025-05-19 NOTE — PLAN OF CARE
Problem: Safety - Adult  Goal: Free from fall injury  5/19/2025 1901 by Joelle Maloney RN  Outcome: Completed  5/19/2025 1200 by Joelle Maloney RN  Outcome: Progressing     Problem: Chronic Conditions and Co-morbidities  Goal: Patient's chronic conditions and co-morbidity symptoms are monitored and maintained or improved  5/19/2025 1901 by Joelle Maloney RN  Outcome: Completed  5/19/2025 1200 by Joelle Maloney RN  Outcome: Progressing     Problem: Discharge Planning  Goal: Discharge to home or other facility with appropriate resources  5/19/2025 1901 by Joelle Maloney RN  Outcome: Completed  5/19/2025 1200 by Joelle Maloney RN  Outcome: Progressing     Problem: Skin/Tissue Integrity  Goal: Skin integrity remains intact  Description: 1.  Monitor for areas of redness and/or skin breakdown2.  Assess vascular access sites hourly3.  Every 4-6 hours minimum:  Change oxygen saturation probe site4.  Every 4-6 hours:  If on nasal continuous positive airway pressure, respiratory therapy assess nares and determine need for appliance change or resting period  5/19/2025 1901 by Joelle Maloney RN  Outcome: Completed  5/19/2025 1200 by Joelle Maloney RN  Outcome: Progressing     Problem: Pain  Goal: Verbalizes/displays adequate comfort level or baseline comfort level  5/19/2025 1901 by Joelle Maloney RN  Outcome: Completed  5/19/2025 1200 by Joelle Maloney RN  Outcome: Progressing  Flowsheets (Taken 5/19/2025 0330 by Michael Milan, RN)  Verbalizes/displays adequate comfort level or baseline comfort level:   Encourage patient to monitor pain and request assistance   Assess pain using appropriate pain scale   Administer analgesics based on type and severity of pain and evaluate response     Problem: ABCDS Injury Assessment  Goal: Absence of physical injury  5/19/2025 1901 by Joelle Maloney RN  Outcome: Completed  5/19/2025 1200 by Joelle Maloney RN  Outcome: Progressing

## 2025-05-19 NOTE — PLAN OF CARE
Problem: Safety - Adult  Goal: Free from fall injury  Outcome: Progressing     Problem: Chronic Conditions and Co-morbidities  Goal: Patient's chronic conditions and co-morbidity symptoms are monitored and maintained or improved  Outcome: Progressing     Problem: Discharge Planning  Goal: Discharge to home or other facility with appropriate resources  Outcome: Progressing     Problem: Skin/Tissue Integrity  Goal: Skin integrity remains intact  Description: 1.  Monitor for areas of redness and/or skin breakdown2.  Assess vascular access sites hourly3.  Every 4-6 hours minimum:  Change oxygen saturation probe site4.  Every 4-6 hours:  If on nasal continuous positive airway pressure, respiratory therapy assess nares and determine need for appliance change or resting period  Outcome: Progressing     Problem: Pain  Goal: Verbalizes/displays adequate comfort level or baseline comfort level  Outcome: Progressing  Flowsheets (Taken 5/18/2025 2029)  Verbalizes/displays adequate comfort level or baseline comfort level:   Encourage patient to monitor pain and request assistance   Assess pain using appropriate pain scale   Administer analgesics based on type and severity of pain and evaluate response     Problem: ABCDS Injury Assessment  Goal: Absence of physical injury  Outcome: Progressing

## 2025-05-19 NOTE — CARE COORDINATION
5/19:  Update CM Note:  Pt presented to the ER for bilateral lower extremity edema from [facility]. Pt is dc today pending Dr Paulson's approval.  Pt's dc plan is to return to [facility]. Per Liaison pt is a long-term bed hold & can return when medically cleared. JULIA & [facility] form completed placed in envelope in soft chart. CM placed pt on will call with [facility] for 5/19. Sw/CM will continue to follow.  Electronically signed by Perla Mckeon RN on 5/19/2025 at 3:43 PM

## 2025-05-19 NOTE — PROGRESS NOTES
Subjective:    Chief complaint:  Feeling okay  No new complaints  Objective:    BP (!) 100/55   Pulse 73   Temp 98 °F (36.7 °C) (Oral)   Resp 18   Ht 1.63 m (5' 4.17\")   Wt 84 kg (185 lb 3 oz)   SpO2 98%   BMI 31.62 kg/m²   General : Awake ,alert,no distress.  Heart:  RRR, no murmurs, gallops, or rubs.  Lungs:  CTA bilaterally, no wheeze, rales or rhonchi  Abd: bowel sounds present, nontender, nondistended, no masses  Extrem:  No clubbing, cyanosis, or edema    CBC:   Lab Results   Component Value Date/Time    WBC 10.1 05/15/2025 12:04 PM    RBC 3.35 05/15/2025 12:04 PM    HGB 10.3 05/15/2025 12:04 PM    HCT 32.7 05/15/2025 12:04 PM    MCV 97.6 05/15/2025 12:04 PM    MCH 30.7 05/15/2025 12:04 PM    MCHC 31.5 05/15/2025 12:04 PM    RDW 14.3 05/15/2025 12:04 PM     05/15/2025 12:04 PM    MPV 9.3 05/15/2025 12:04 PM     BMP:    Lab Results   Component Value Date/Time     05/18/2025 04:54 AM    K 3.6 05/18/2025 04:54 AM    K 3.3 05/13/2022 11:22 AM     05/18/2025 04:54 AM    CO2 29 05/18/2025 04:54 AM    BUN 17 05/18/2025 04:54 AM    CREATININE 0.8 05/18/2025 04:54 AM    CALCIUM 9.2 05/18/2025 04:54 AM    GFRAA >60 05/14/2022 01:59 AM    LABGLOM 73 05/18/2025 04:54 AM    LABGLOM >60 12/16/2023 05:37 AM    GLUCOSE 91 05/18/2025 04:54 AM    GLUCOSE 97 05/05/2012 04:25 AM     PT/INR:    Lab Results   Component Value Date/Time    PROTIME 14.5 12/10/2023 05:15 PM    PROTIME 12.8 11/28/2011 06:20 PM    INR 1.3 12/10/2023 05:15 PM     Troponin:    Lab Results   Component Value Date/Time    TROPONINI <0.01 09/16/2018 03:19 PM       No results for input(s): \"LABURIN\" in the last 72 hours.  No results for input(s): \"BC\" in the last 72 hours.  No results for input(s): \"BLOODCULT2\" in the last 72 hours.      Current Facility-Administered Medications:     metoprolol tartrate (LOPRESSOR) tablet 25 mg, 25 mg, Oral, BID, Tor Paulson DO, 25 mg at 05/19/25 1007    lisinopril (PRINIVIL;ZESTRIL) tablet 10  mg, 10 mg, Oral, Daily, Tor Paulson DO, 10 mg at 05/19/25 1007    nitroglycerin (NITRO-BID) 2 % ointment 1 inch, 1 inch, Topical, 3 times per day, Tor Paulson DO, 1 inch at 05/18/25 1724    HYDROcodone-acetaminophen (NORCO) 5-325 MG per tablet 1 tablet, 1 tablet, Oral, Q6H PRN, Vinay Mariscal MD, 1 tablet at 05/19/25 1015    aspirin chewable tablet 81 mg, 81 mg, Oral, QAM, Vinay Mariscal MD, 81 mg at 05/19/25 1007    atorvastatin (LIPITOR) tablet 40 mg, 40 mg, Oral, Daily, Vinay Mariscal MD, 40 mg at 05/19/25 1006    clopidogrel (PLAVIX) tablet 75 mg, 75 mg, Oral, Daily, Vinay Mariscal MD, 75 mg at 05/19/25 1007    mirtazapine (REMERON) tablet 15 mg, 15 mg, Oral, Nightly, Vinay Mariscal MD, 15 mg at 05/18/25 2129    ezetimibe (ZETIA) tablet 10 mg, 10 mg, Oral, Daily, Vinay Mariscal MD, 10 mg at 05/19/25 1006    pantoprazole (PROTONIX) tablet 40 mg, 40 mg, Oral, QAABDOUL, Vinay Mariscal MD, 40 mg at 05/19/25 1006    albuterol (PROVENTIL) (2.5 MG/3ML) 0.083% nebulizer solution 2.5 mg, 2.5 mg, Nebulization, Q6H PRN, Vinay Mariscal MD    loperamide (IMODIUM) capsule 2 mg, 2 mg, Oral, Q6H PRN, Vinay Mariscal MD    acetaminophen (TYLENOL) tablet 650 mg, 650 mg, Oral, Q6H PRN, Vinay Mariscal MD, 650 mg at 05/17/25 0938    docusate sodium (COLACE) capsule 100 mg, 100 mg, Oral, BID, Vinay Mariscal MD, 100 mg at 05/19/25 1006    lactulose (CHRONULAC) 10 GM/15ML solution 20 g, 20 g, Oral, Daily, Vinay Mariscal MD, 20 g at 05/18/25 0855    arformoterol tartrate (BROVANA) nebulizer solution 15 mcg, 15 mcg, Nebulization, BID RT, Vniay Mariscal MD, 15 mcg at 05/19/25 0744    clonazePAM (KLONOPIN) tablet 1 mg, 1 mg, Oral, BID PRN, Vinay Mariscal MD, 1 mg at 05/18/25 0738    budesonide (PULMICORT) nebulizer suspension 1,000 mcg, 1 mg, Nebulization, BID RT, Vinay Mariscal MD, 1,000 mcg at 05/19/25 2490

## 2025-05-19 NOTE — PLAN OF CARE
Problem: Safety - Adult  Goal: Free from fall injury  5/19/2025 1200 by Joelle Maloney RN  Outcome: Progressing  5/18/2025 2213 by Michael Milan RN  Outcome: Progressing  Flowsheets (Taken 5/18/2025 2213)  Free From Fall Injury: Instruct family/caregiver on patient safety     Problem: Chronic Conditions and Co-morbidities  Goal: Patient's chronic conditions and co-morbidity symptoms are monitored and maintained or improved  5/19/2025 1200 by Joelle Maloney RN  Outcome: Progressing  5/18/2025 2213 by Michael Milan RN  Outcome: Progressing  Flowsheets (Taken 5/18/2025 2015)  Care Plan - Patient's Chronic Conditions and Co-Morbidity Symptoms are Monitored and Maintained or Improved:   Monitor and assess patient's chronic conditions and comorbid symptoms for stability, deterioration, or improvement   Collaborate with multidisciplinary team to address chronic and comorbid conditions and prevent exacerbation or deterioration   Update acute care plan with appropriate goals if chronic or comorbid symptoms are exacerbated and prevent overall improvement and discharge     Problem: Discharge Planning  Goal: Discharge to home or other facility with appropriate resources  5/19/2025 1200 by Joelle Maolney RN  Outcome: Progressing  5/18/2025 2213 by Michael Milan RN  Outcome: Progressing  Flowsheets (Taken 5/18/2025 2015)  Discharge to home or other facility with appropriate resources:   Identify barriers to discharge with patient and caregiver   Arrange for needed discharge resources and transportation as appropriate     Problem: Skin/Tissue Integrity  Goal: Skin integrity remains intact  Description: 1.  Monitor for areas of redness and/or skin breakdown2.  Assess vascular access sites hourly3.  Every 4-6 hours minimum:  Change oxygen saturation probe site4.  Every 4-6 hours:  If on nasal continuous positive airway pressure, respiratory therapy assess nares and determine need for appliance change or resting

## 2025-05-20 NOTE — PROGRESS NOTES
Patient received the Sacrament of the Anointing of the Sick by Father Deyvi Izaguirre on Monday, May, 19, 2025.    If additional support is requested or needed please reach out to Spiritual Health ([id number]).    Chap. Kaushal Olmedo MDIV, BCC

## 2025-05-20 NOTE — PROGRESS NOTES
PROGRESS NOTE       PATIENT PROBLEM LIST:  Patient Active Problem List   Diagnosis Code    Aortic stenosis I35.0    COPD (chronic obstructive pulmonary disease) ([facility]) J44.9    Hypertension I10    H/O hyperlipidemia Z86.39    CAD (coronary artery disease) I25.10    Acute CVA (cerebrovascular accident) ([facility]) I63.9    Cerebral aneurysm I67.1    Intracranial atherosclerosis I67.2    Stroke-like symptoms R29.90    TIA (transient ischemic attack) G45.9    Acute cystitis with hematuria N30.01    NINA (acute kidney injury) N17.9    Acute cystitis without hematuria N30.00    Failure to thrive in adult R62.7    Anxiety and depression F41.9, F32.A    Asthma J45.909    Chronic back pain M54.9, G89.29    GERD (gastroesophageal reflux disease) K21.9    Hyperlipidemia E78.5    Somnolence R40.0    Anemia requiring transfusions D64.9    Acute on chronic congestive heart failure, unspecified heart failure type ([facility]) I50.9       SUBJECTIVE:  Barbara SHALINI Hutson notes that she is feeling improved but concerned with what she perceives as discoloration of her feet and slight swelling.Other than her right parasternal discomfort which increases upon palpation she does not have any anginal-like symptoms presently.  REVIEW OF SYSTEMS:  General ROS: negative for - fatigue, malaise,  weight gain or weight loss  Psychological ROS: positive for - anxiety .  Ophthalmic ROS: negative for - decreased vision or visual distortion.  ENT ROS: negative  Allergy and Immunology ROS: negative  Hematological and Lymphatic ROS: negative  Endocrine: no heat or cold intolerance and no polyphagia, polydipsia, or polyuria  Respiratory ROS: positive for - Occasional shortness of breath but no hemoptysis.  Cardiovascular ROS: positive for - Right parasternal chest discomfort..  Gastrointestinal ROS: no abdominal pain, change in bowel habits, or black or bloody stools  Genito-Urinary ROS: no nocturia, dysuria, trouble voiding, frequency or hematuria  Musculoskeletal ROS:

## 2025-05-23 LAB
ECHO AO ASC DIAM: 3.2 CM
ECHO AO ASCENDING AORTA INDEX: 1.68 CM/M2
ECHO AV ACCELERATION TIME: 72.66 MS
ECHO AV AREA PEAK VELOCITY: 2.1 CM2
ECHO AV AREA VTI: 2.3 CM2
ECHO AV AREA/BSA PEAK VELOCITY: 1.1 CM2/M2
ECHO AV AREA/BSA VTI: 1.2 CM2/M2
ECHO AV CUSP MM: 1.6 CM
ECHO AV MEAN GRADIENT: 17 MMHG
ECHO AV MEAN VELOCITY: 2.1 M/S
ECHO AV PEAK GRADIENT: 26 MMHG
ECHO AV PEAK VELOCITY: 2.5 M/S
ECHO AV VELOCITY RATIO: 0.56
ECHO AV VTI: 57.4 CM
ECHO BSA: 1.95 M2
ECHO EST RA PRESSURE: 8 MMHG
ECHO LA DIAMETER INDEX: 2.21 CM/M2
ECHO LA DIAMETER: 4.2 CM
ECHO LA VOL A-L A2C: 71 ML (ref 22–52)
ECHO LA VOL A-L A4C: 57 ML (ref 22–52)
ECHO LA VOL MOD A2C: 66 ML (ref 22–52)
ECHO LA VOL MOD A4C: 55 ML (ref 22–52)
ECHO LA VOLUME AREA LENGTH: 65 ML
ECHO LA VOLUME INDEX A-L A2C: 37 ML/M2 (ref 16–34)
ECHO LA VOLUME INDEX A-L A4C: 30 ML/M2 (ref 16–34)
ECHO LA VOLUME INDEX AREA LENGTH: 34 ML/M2 (ref 16–34)
ECHO LA VOLUME INDEX MOD A2C: 35 ML/M2 (ref 16–34)
ECHO LA VOLUME INDEX MOD A4C: 29 ML/M2 (ref 16–34)
ECHO LV EDV A2C: 44 ML
ECHO LV EDV A4C: 70 ML
ECHO LV EDV BP: 58 ML (ref 56–104)
ECHO LV EDV INDEX A4C: 37 ML/M2
ECHO LV EDV INDEX BP: 31 ML/M2
ECHO LV EDV NDEX A2C: 23 ML/M2
ECHO LV EF PHYSICIAN: 66 %
ECHO LV EJECTION FRACTION A2C: 64 %
ECHO LV EJECTION FRACTION A4C: 56 %
ECHO LV EJECTION FRACTION BIPLANE: 61 % (ref 55–100)
ECHO LV ESV A2C: 16 ML
ECHO LV ESV A4C: 31 ML
ECHO LV ESV BP: 23 ML (ref 19–49)
ECHO LV ESV INDEX A2C: 8 ML/M2
ECHO LV ESV INDEX A4C: 16 ML/M2
ECHO LV ESV INDEX BP: 12 ML/M2
ECHO LV FRACTIONAL SHORTENING: 34 % (ref 28–44)
ECHO LV INTERNAL DIMENSION DIASTOLE INDEX: 2 CM/M2
ECHO LV INTERNAL DIMENSION DIASTOLIC: 3.8 CM (ref 3.9–5.3)
ECHO LV INTERNAL DIMENSION SYSTOLIC INDEX: 1.32 CM/M2
ECHO LV INTERNAL DIMENSION SYSTOLIC: 2.5 CM
ECHO LV ISOVOLUMETRIC RELAXATION TIME (IVRT): 124.6 MS
ECHO LV IVSD: 1.6 CM (ref 0.6–0.9)
ECHO LV IVSS: 1.8 CM
ECHO LV MASS 2D: 205.2 G (ref 67–162)
ECHO LV MASS INDEX 2D: 108 G/M2 (ref 43–95)
ECHO LV POSTERIOR WALL DIASTOLIC: 1.3 CM (ref 0.6–0.9)
ECHO LV POSTERIOR WALL SYSTOLIC: 1.3 CM
ECHO LV RELATIVE WALL THICKNESS RATIO: 0.68
ECHO LVOT AREA: 3.8 CM2
ECHO LVOT AV VTI INDEX: 0.59
ECHO LVOT DIAM: 2.2 CM
ECHO LVOT MEAN GRADIENT: 4 MMHG
ECHO LVOT PEAK GRADIENT: 8 MMHG
ECHO LVOT PEAK VELOCITY: 1.4 M/S
ECHO LVOT STROKE VOLUME INDEX: 67.6 ML/M2
ECHO LVOT SV: 128.4 ML
ECHO LVOT VTI: 33.8 CM
ECHO MV "A" WAVE DURATION: 131.5 MSEC
ECHO MV A VELOCITY: 1.49 M/S
ECHO MV AREA PHT: 1.9 CM2
ECHO MV AREA VTI: 3.4 CM2
ECHO MV E DECELERATION TIME (DT): 385.1 MS
ECHO MV E VELOCITY: 0.89 M/S
ECHO MV E/A RATIO: 0.6
ECHO MV LVOT VTI INDEX: 1.11
ECHO MV MAX VELOCITY: 1.6 M/S
ECHO MV MEAN GRADIENT: 3 MMHG
ECHO MV MEAN VELOCITY: 0.8 M/S
ECHO MV PEAK GRADIENT: 10 MMHG
ECHO MV PRESSURE HALF TIME (PHT): 116.9 MS
ECHO MV VTI: 37.5 CM
ECHO PV MAX VELOCITY: 1 M/S
ECHO PV MEAN GRADIENT: 2 MMHG
ECHO PV MEAN VELOCITY: 0.7 M/S
ECHO PV PEAK GRADIENT: 4 MMHG
ECHO PV VTI: 30.5 CM
ECHO RIGHT VENTRICULAR SYSTOLIC PRESSURE (RVSP): 38 MMHG
ECHO RV INTERNAL DIMENSION: 2.6 CM
ECHO RV TAPSE: 1.2 CM (ref 1.7–?)
ECHO TV REGURGITANT MAX VELOCITY: 2.72 M/S
ECHO TV REGURGITANT PEAK GRADIENT: 30 MMHG

## 2025-05-30 NOTE — ACP (ADVANCE CARE PLANNING)
Advance Care Planning   The patient has the following advanced directives on file:  Advance Directives       Power of  Living Will ACP-Advance Directive ACP-Power of     Not on File Filed on 09/17/12 Filed Not on File            The patient has appointed the following active healthcare agents:    Primary Decision Maker: Melvin Broderick Child - [phone]    The Patient has the following current code status:    Code Status: Full Code      Kavon Falk RN  12/12/2023 The patient is Stable - Low risk of patient condition declining or worsening    Shift Goals  Clinical Goals: fall prevention, promote mobility and independance, support nutritional and hydration needs.  Patient Goals: rst and recovery  Family Goals: Updates    Progress made toward(s) clinical / shift goals:    Problem: Knowledge Deficit - Standard  Goal: Patient and family/care givers will demonstrate understanding of plan of care, disease process/condition, diagnostic tests and medications  Outcome: Progressing  Note: Assessed patient's understanding, provided clear education, and reinforced key information, to promote comprehension and adherence.     Problem: Skin Integrity  Goal: Skin integrity is maintained or improved  Outcome: Progressing  Note: Assessed skin integrity regularly, repositioned every 2 hour and as needed, educated patient on proper skin care and pressure injury       Patient is not progressing towards the following goals:

## 2025-06-02 ENCOUNTER — TRANSCRIBE ORDERS (OUTPATIENT)
Dept: ADMINISTRATIVE | Age: 80
End: 2025-06-02

## 2025-06-02 DIAGNOSIS — K59.00 CONSTIPATION, UNSPECIFIED CONSTIPATION TYPE: ICD-10-CM

## 2025-06-02 DIAGNOSIS — Z80.0 FAMILY HISTORY OF MALIGNANT NEOPLASM OF DIGESTIVE ORGAN: ICD-10-CM

## 2025-06-02 DIAGNOSIS — R10.32 LEFT LOWER QUADRANT PAIN: Primary | ICD-10-CM

## 2025-06-02 DIAGNOSIS — Z86.0100 PERSONAL HISTORY OF COLON POLYPS, UNSPECIFIED: ICD-10-CM

## 2025-06-02 DIAGNOSIS — D64.9 ANEMIA, UNSPECIFIED TYPE: ICD-10-CM
